# Patient Record
Sex: FEMALE | Race: WHITE | NOT HISPANIC OR LATINO | Employment: UNEMPLOYED | URBAN - METROPOLITAN AREA
[De-identification: names, ages, dates, MRNs, and addresses within clinical notes are randomized per-mention and may not be internally consistent; named-entity substitution may affect disease eponyms.]

---

## 2022-11-02 DIAGNOSIS — R56.9 SEIZURE: Primary | ICD-10-CM

## 2024-02-01 ENCOUNTER — OFFICE VISIT (OUTPATIENT)
Dept: NEUROLOGY | Facility: CLINIC | Age: 69
End: 2024-02-01
Payer: MEDICARE

## 2024-02-01 VITALS — SYSTOLIC BLOOD PRESSURE: 120 MMHG | DIASTOLIC BLOOD PRESSURE: 83 MMHG | HEART RATE: 90 BPM | WEIGHT: 163.56 LBS

## 2024-02-01 DIAGNOSIS — R56.9 CONVULSIONS, UNSPECIFIED CONVULSION TYPE: Primary | ICD-10-CM

## 2024-02-01 DIAGNOSIS — I50.9 CONGESTIVE HEART FAILURE, UNSPECIFIED HF CHRONICITY, UNSPECIFIED HEART FAILURE TYPE: ICD-10-CM

## 2024-02-01 PROCEDURE — 99417 PROLNG OP E/M EACH 15 MIN: CPT | Mod: S$PBB,,, | Performed by: PSYCHIATRY & NEUROLOGY

## 2024-02-01 PROCEDURE — 99213 OFFICE O/P EST LOW 20 MIN: CPT | Mod: PBBFAC | Performed by: PSYCHIATRY & NEUROLOGY

## 2024-02-01 PROCEDURE — 99205 OFFICE O/P NEW HI 60 MIN: CPT | Mod: S$PBB,,, | Performed by: PSYCHIATRY & NEUROLOGY

## 2024-02-01 PROCEDURE — 99999 PR PBB SHADOW E&M-EST. PATIENT-LVL III: CPT | Mod: PBBFAC,,, | Performed by: PSYCHIATRY & NEUROLOGY

## 2024-02-01 RX ORDER — MAGNESIUM GLUCONATE 27.5 (500)
500 TABLET ORAL
COMMUNITY

## 2024-02-01 RX ORDER — LOSARTAN POTASSIUM 50 MG/1
50 TABLET ORAL
COMMUNITY

## 2024-02-01 RX ORDER — LEVETIRACETAM 1000 MG/1
1500 TABLET ORAL 2 TIMES DAILY
Status: ON HOLD | COMMUNITY
End: 2024-03-21

## 2024-02-01 RX ORDER — CENOBAMATE 200 MG/1
1 TABLET, FILM COATED ORAL
Status: ON HOLD | COMMUNITY
Start: 2024-01-08 | End: 2024-03-21 | Stop reason: HOSPADM

## 2024-02-01 RX ORDER — NIFEDIPINE 30 MG/1
30 TABLET, EXTENDED RELEASE ORAL
Status: ON HOLD | COMMUNITY
End: 2024-03-18

## 2024-02-01 RX ORDER — NIFEDIPINE 30 MG/1
30 TABLET, FILM COATED, EXTENDED RELEASE ORAL 2 TIMES DAILY
COMMUNITY
Start: 2023-12-21 | End: 2024-04-19

## 2024-02-01 RX ORDER — TOPIRAMATE 25 MG/1
2 CAPSULE, EXTENDED RELEASE ORAL 2 TIMES DAILY
Status: ON HOLD | COMMUNITY
Start: 2024-01-09 | End: 2024-03-21 | Stop reason: HOSPADM
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
## 2024-02-01 NOTE — PATIENT INSTRUCTIONS
You came to Epilepsy Clinic because of your seizure disorder.  Your seizures are not well controlled. I would like to schedule you for an inpatient epilepsy monitoring unit (EMU) admission.  During this prolonged inpatient admission, you will be monitored continuously using scalp EEGs for as long as 5-7 days.  During this admission, we will lower or stop your antiseizure medications and may perform different maneuvers to try to induce seizures.  There are risks associated with inducing seizures, including injury and even death.  However, these risks persist in the hospital or at home with every seizure and we take many steps to protect you including a padded bed with rails, continuous video monitoring, a call button for immediate nursing assistance, continuous cardiac monitoring, and an inpatient medical team to closely follow your progress. It is too dangerous to stop medications at home without continuous monitoring so this must be done while inpatient.  I feel this admission is necessary in order to better understand your seizure disorder so we can best treat it.  Our epilepsy nurse will call you to schedule this appointment.      In order for us to proceed with the EMU, you will need to get a routine outpatient EEG to look at the electrical firing of your brain. Please call [phone] to schedule this.  Please sleep half your normal amount before the test so that you will fall asleep during the procedure which will provide us more information about how your brain works.  I would also like you to get an MRI to investigate whether a structural abnormality is contributing to your seizures.  Your study is with contrast, an IV will be placed in order to administer the dye during the procedure.  You will need to get a lab confirming your kidneys are healthy prior to the administration of contrast dye.    Topiramate is associated with eye changes. Please go back to the eye doctor to evaluate your worsening vision  issues.     Do not miss any doses of medication. If a dose of medication is missed, take it as soon as it is remembered even if that means doubling up on the dose. Please get a lab test to check out the blood level of medication. Get regular sleep. Go to sleep at the same time and wake up at the same time every day. People with epilepsy require more sleep than people without epilepsy.  Sleeping 10-12 hours a day can be normal for a person with epilepsy.  Every seizure makes it harder to prevent the next seizure. Epilepsy is associated with SUDEP, or sudden unexpected death in epilepsy.  The risk is significantly higher if convulsive seizures are not well controlled. For more information, check out these websites: https://www.epilepsy.com/, https://www.epilepsyalliance[place].org/, www.sera-epilepsy.org, www.womenandepilepsy.org.  If you are interested in meeting other individuals in our epilepsy community, please reach out to the [facility] [place] ([phone], [phone], [email or web address]).  They organize many informative and fun activities in the region.  They can provide you invaluable information on how to get access to resources available for patients living with epilepsy as well as a rich community of like-minded individuals who are all learning to cope with the same issues.  It is very important to remember, you are not alone.     Per [place] law, no episodes of loss of consciousness for 6 months before driving.  Avoid dangerous situations.  For example, no baths/pools alone, no heights, no power tools.  Wear a bike helmet.  If breakthrough seizures occur that are different in character, frequency, or duration from normal episodes, please patient portal me or call the office and we will decide the next steps. If multiple seizures occur in a row without return back to baseline, 911 needs to be called.     Return to clinic in 2 months or sooner with issues.  Please patient portal with  any questions or concerns.    Gladis Gamez MD PhD [facility]  Neurology-Epilepsy  [facility].    Forms/Letters/Disability/DMV Paperwork: We understand the importance of filling out forms and providing letters in a timely manner.  However, many of these forms have very tricky language and once an official form is submitted as part of the medical record, it can not be modified or erased.  Please work with us in order to get these forms filled out in the most complete, accurate, and efficient way. 1.  Once you are aware that a form will need to be completed, please make an appointment.  A virtual appointment with Dr. Gamez or Maritza is perfectly fine. 2.  Please fill out the form as much as you can.  There are many questions that we do not have an answer for.  Please bring a blank copy of the form and your partially filled out form to the clinic visit or send them to us over the portal.  We will complete the form together with you during the clinic visit, sign it, and either return it to you or send it to the correct destination.  Every form will require an appointment however we can fill out multiple forms at once if needed.  Please do not hesitate to reach out with any questions or concerns about this policy.  We are trying to make sure that we have a system in place to meet this need which works for everyone involved.  Thank you for your understanding.

## 2024-02-02 ENCOUNTER — TELEPHONE (OUTPATIENT)
Dept: NEUROLOGY | Facility: CLINIC | Age: 69
End: 2024-02-02
Payer: MEDICARE

## 2024-02-02 ENCOUNTER — LAB VISIT (OUTPATIENT)
Dept: LAB | Facility: HOSPITAL | Age: 69
End: 2024-02-02
Attending: PSYCHIATRY & NEUROLOGY
Payer: MEDICARE

## 2024-02-02 DIAGNOSIS — R56.9 CONVULSIONS, UNSPECIFIED CONVULSION TYPE: ICD-10-CM

## 2024-02-02 DIAGNOSIS — R56.9 CONVULSIONS, UNSPECIFIED CONVULSION TYPE: Primary | ICD-10-CM

## 2024-02-02 LAB
ALBUMIN SERPL BCP-MCNC: 4.1 G/DL (ref 3.5–5.2)
ALP SERPL-CCNC: 120 U/L (ref 55–135)
ALT SERPL W/O P-5'-P-CCNC: 21 U/L (ref 10–44)
ANION GAP SERPL CALC-SCNC: 4 MMOL/L (ref 8–16)
AST SERPL-CCNC: 21 U/L (ref 10–40)
BILIRUB SERPL-MCNC: 0.3 MG/DL (ref 0.1–1)
BUN SERPL-MCNC: 23 MG/DL (ref 8–23)
CALCIUM SERPL-MCNC: 9.9 MG/DL (ref 8.7–10.5)
CHLORIDE SERPL-SCNC: 110 MMOL/L (ref 95–110)
CO2 SERPL-SCNC: 27 MMOL/L (ref 23–29)
CREAT SERPL-MCNC: 0.9 MG/DL (ref 0.5–1.4)
ERYTHROCYTE [DISTWIDTH] IN BLOOD BY AUTOMATED COUNT: 12.3 % (ref 11.5–14.5)
EST. GFR  (NO RACE VARIABLE): >60 ML/MIN/1.73 M^2
GLUCOSE SERPL-MCNC: 96 MG/DL (ref 70–110)
HCT VFR BLD AUTO: 39.9 % (ref 37–48.5)
HGB BLD-MCNC: 12.7 G/DL (ref 12–16)
MCH RBC QN AUTO: 29.4 PG (ref 27–31)
MCHC RBC AUTO-ENTMCNC: 31.8 G/DL (ref 32–36)
MCV RBC AUTO: 92 FL (ref 82–98)
PLATELET # BLD AUTO: 228 K/UL (ref 150–450)
PMV BLD AUTO: 10.4 FL (ref 9.2–12.9)
POTASSIUM SERPL-SCNC: 4.2 MMOL/L (ref 3.5–5.1)
PROT SERPL-MCNC: 7.6 G/DL (ref 6–8.4)
RBC # BLD AUTO: 4.32 M/UL (ref 4–5.4)
SODIUM SERPL-SCNC: 141 MMOL/L (ref 136–145)
WBC # BLD AUTO: 5.16 K/UL (ref 3.9–12.7)

## 2024-02-02 PROCEDURE — 80299 QUANTITATIVE ASSAY DRUG: CPT | Performed by: PSYCHIATRY & NEUROLOGY

## 2024-02-02 PROCEDURE — 80177 DRUG SCRN QUAN LEVETIRACETAM: CPT | Performed by: PSYCHIATRY & NEUROLOGY

## 2024-02-02 PROCEDURE — 80053 COMPREHEN METABOLIC PANEL: CPT | Performed by: PSYCHIATRY & NEUROLOGY

## 2024-02-02 PROCEDURE — 85027 COMPLETE CBC AUTOMATED: CPT | Performed by: PSYCHIATRY & NEUROLOGY

## 2024-02-02 NOTE — TELEPHONE ENCOUNTER
Scheduled EMU 3/18/24, 1pm. Aware an adult is required to accompany her for the duration including overnight. Aware she will be connected to lines to her head, chest, arm, have an IV placed; will not be able to leave the room until all equipment is removed at discharge. 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 She mentioned she told her cardiologist about this admission and he was ok with her to proceed. Instructions thoroughly discussed; mailed via [employer] to preferred address as follows: [address] [address].

## 2024-02-05 LAB
LEVETIRACETAM SERPL-MCNC: 36.1 UG/ML (ref 3–60)
TOPIRAMATE SERPL-MCNC: 2.4 MCG/ML

## 2024-02-09 LAB — CENOBAMATE (XCOPRI), PLASMA: 19.3 UG/ML

## 2024-03-04 ENCOUNTER — TELEPHONE (OUTPATIENT)
Dept: NEUROLOGY | Facility: CLINIC | Age: 69
End: 2024-03-04
Payer: MEDICARE

## 2024-03-07 ENCOUNTER — HOSPITAL ENCOUNTER (OUTPATIENT)
Dept: NEUROLOGY | Facility: CLINIC | Age: 69
Discharge: HOME | End: 2024-03-07
Payer: MEDICARE

## 2024-03-07 ENCOUNTER — PATIENT MESSAGE (OUTPATIENT)
Dept: NEUROLOGY | Facility: CLINIC | Age: 69
End: 2024-03-07
Payer: MEDICARE

## 2024-03-07 ENCOUNTER — HOSPITAL ENCOUNTER (OUTPATIENT)
Dept: RADIOLOGY | Facility: HOSPITAL | Age: 69
Discharge: HOME | End: 2024-03-07
Attending: PSYCHIATRY & NEUROLOGY
Payer: MEDICARE

## 2024-03-07 ENCOUNTER — TELEPHONE (OUTPATIENT)
Dept: NEUROLOGY | Facility: CLINIC | Age: 69
End: 2024-03-07
Payer: MEDICARE

## 2024-03-07 DIAGNOSIS — R56.9 CONVULSIONS, UNSPECIFIED CONVULSION TYPE: ICD-10-CM

## 2024-03-07 PROCEDURE — 25500020 PHARM REV CODE 255: Performed by: PSYCHIATRY & NEUROLOGY

## 2024-03-07 PROCEDURE — A9585 GADOBUTROL INJECTION: HCPCS | Performed by: PSYCHIATRY & NEUROLOGY

## 2024-03-07 PROCEDURE — 70553 MRI BRAIN STEM W/O & W/DYE: CPT | Mod: TC

## 2024-03-07 PROCEDURE — 70553 MRI BRAIN STEM W/O & W/DYE: CPT | Mod: 26,,, | Performed by: STUDENT IN AN ORGANIZED HEALTH CARE EDUCATION/TRAINING PROGRAM

## 2024-03-07 PROCEDURE — 95819 EEG AWAKE AND ASLEEP: CPT | Mod: 26,S$PBB,, | Performed by: PSYCHIATRY & NEUROLOGY

## 2024-03-07 PROCEDURE — 95819 EEG AWAKE AND ASLEEP: CPT | Mod: PBBFAC | Performed by: PSYCHIATRY & NEUROLOGY

## 2024-03-07 RX ORDER — GADOBUTROL 604.72 MG/ML
8 INJECTION INTRAVENOUS
Status: COMPLETED | OUTPATIENT
Start: 2024-03-07 | End: 2024-03-07

## 2024-03-07 RX ADMIN — GADOBUTROL 8 ML: 604.72 INJECTION INTRAVENOUS at 08:03

## 2024-03-07 NOTE — PROCEDURES
Date of service  03/07/2024    Introduction  Electroencephalographic (EEG) recording is performed with electrodes placed according to the International 10-20 placement system. Thirty two (32) channels of digital signal (sampling rate of 512/sec) including T1 and T2 was simultaneously recorded from the scalp and may include EKG, EMG, and/or eye monitors. Recording band pass was 0.1 to 512 Hz. Digital video recording of the patient is simultaneously recorded with the EEG. The patient is instructed to report clinical symptoms which may occur during the recording session. EEG and video recording is stored and archived in digital format. Activation procedures which include photic stimulation, hyperventilation and instructing patients to perform simple tasks are done in selected patients.    The EEG is displayed on a monitor screen and can be reviewed using different montages. Computer assisted analysis is employed to detect spike and electrographic seizure activity. The entire record is submitted for computer analysis. The entire recording is visually reviewed and, the times identified by computer analysis as being spikes or seizures are reviewed again.     Compressed spectral analysis (CSA) is also performed on the activity recorded from each individual channel. This is displayed as a power display of frequencies from 0 to 30 Hz over time. The CSA is reviewed looking for asymmetries in power between homologous areas of the scalp and then compared with the original EEG recording.     Findings  The patient's background consists of a posteriorly dominant 10 Hz alpha rhythm, which is well-formed, modulated, and abolishes with eye opening.  Anteriorly, the patient's background consists of predominantly beta range frequencies.    Hyperventilation and photic stimulation did not produce any abnormal response. Patient answered orientation questions correctly.     During the course of the recording, the patient is noted to be  awake, subsequently becomes drowsy, and falls asleep briefly with normal, symmetric sleep architecture seen. During drowsiness there is mild 6-7 Hz right temporal slowing.    There are no focal, lateralized, or epileptiform transients.  No electrographic seizures are seen.    EKG demonstrates regular rhythm.    Interpretation  This EEG shows mild right temporal focal slowing, which is indicative of focal disturbance of cerebral function. No potentially epileptiform activity was seen. Please be aware that a normal EEG does not exclude the possibility of an underlying seizure disorder.

## 2024-03-07 NOTE — TELEPHONE ENCOUNTER
----- Message from Gladis Gamez MD PhD sent at 3/6/2024  6:14 AM CST -----    ----- Message -----  From: Radha Arriaga  Sent: 3/5/2024  11:45 AM CST  To: Franco Griffith Staff    Type:  Patient Returning Call    Who Called: Pt  Who Left Message for Patient:  Does the patient know what this is regarding?: procedure instructions  Would the patient rather a call back or a response via [facility]? Call  Best Call Back Number:  [phone]  Additional Information: Pt would like to speak to provider's nurse to get instructions related to procedure scheduled for 3/18/24.  Pt states she misplaced the paper with the instructions on them and would like another copy.

## 2024-03-18 ENCOUNTER — HOSPITAL ENCOUNTER (INPATIENT)
Facility: HOSPITAL | Age: 69
LOS: 5 days | Discharge: HOME | DRG: 101 | End: 2024-03-23
Attending: PSYCHIATRY & NEUROLOGY | Admitting: STUDENT IN AN ORGANIZED HEALTH CARE EDUCATION/TRAINING PROGRAM
Payer: MEDICARE

## 2024-03-18 ENCOUNTER — DOCUMENTATION ONLY (OUTPATIENT)
Dept: NEUROLOGY | Facility: CLINIC | Age: 69
End: 2024-03-18
Payer: MEDICARE

## 2024-03-18 DIAGNOSIS — R07.9 CHEST PAIN: ICD-10-CM

## 2024-03-18 DIAGNOSIS — R56.9 CONVULSIONS, UNSPECIFIED CONVULSION TYPE: ICD-10-CM

## 2024-03-18 DIAGNOSIS — R56.9 SEIZURE: ICD-10-CM

## 2024-03-18 DIAGNOSIS — G40.909 SECONDARY SEIZURE DISORDER: Primary | ICD-10-CM

## 2024-03-18 DIAGNOSIS — R07.89 OTHER CHEST PAIN: ICD-10-CM

## 2024-03-18 PROBLEM — Z86.79 HISTORY OF SPONTANEOUS INTRAPARENCHYMAL INTRACRANIAL HEMORRHAGE: Status: ACTIVE | Noted: 2024-03-18

## 2024-03-18 LAB
ALBUMIN SERPL BCP-MCNC: 4.1 G/DL (ref 3.5–5.2)
ALP SERPL-CCNC: 115 U/L (ref 55–135)
ALT SERPL W/O P-5'-P-CCNC: 20 U/L (ref 10–44)
ANION GAP SERPL CALC-SCNC: 9 MMOL/L (ref 8–16)
AST SERPL-CCNC: 21 U/L (ref 10–40)
BASOPHILS # BLD AUTO: 0.06 K/UL (ref 0–0.2)
BASOPHILS NFR BLD: 0.8 % (ref 0–1.9)
BILIRUB SERPL-MCNC: 0.2 MG/DL (ref 0.1–1)
BUN SERPL-MCNC: 23 MG/DL (ref 8–23)
CALCIUM SERPL-MCNC: 10.1 MG/DL (ref 8.7–10.5)
CHLORIDE SERPL-SCNC: 104 MMOL/L (ref 95–110)
CO2 SERPL-SCNC: 25 MMOL/L (ref 23–29)
CREAT SERPL-MCNC: 0.8 MG/DL (ref 0.5–1.4)
DIFFERENTIAL METHOD BLD: ABNORMAL
EOSINOPHIL # BLD AUTO: 0.1 K/UL (ref 0–0.5)
EOSINOPHIL NFR BLD: 1.7 % (ref 0–8)
ERYTHROCYTE [DISTWIDTH] IN BLOOD BY AUTOMATED COUNT: 12.4 % (ref 11.5–14.5)
EST. GFR  (NO RACE VARIABLE): >60 ML/MIN/1.73 M^2
GLUCOSE SERPL-MCNC: 99 MG/DL (ref 70–110)
HCT VFR BLD AUTO: 41 % (ref 37–48.5)
HGB BLD-MCNC: 12.9 G/DL (ref 12–16)
IMM GRANULOCYTES # BLD AUTO: 0.03 K/UL (ref 0–0.04)
IMM GRANULOCYTES NFR BLD AUTO: 0.4 % (ref 0–0.5)
LYMPHOCYTES # BLD AUTO: 2.6 K/UL (ref 1–4.8)
LYMPHOCYTES NFR BLD: 36.7 % (ref 18–48)
MCH RBC QN AUTO: 29.7 PG (ref 27–31)
MCHC RBC AUTO-ENTMCNC: 31.5 G/DL (ref 32–36)
MCV RBC AUTO: 94 FL (ref 82–98)
MONOCYTES # BLD AUTO: 0.7 K/UL (ref 0.3–1)
MONOCYTES NFR BLD: 9.1 % (ref 4–15)
NEUTROPHILS # BLD AUTO: 3.7 K/UL (ref 1.8–7.7)
NEUTROPHILS NFR BLD: 51.3 % (ref 38–73)
NRBC BLD-RTO: 0 /100 WBC
PLATELET # BLD AUTO: 241 K/UL (ref 150–450)
PMV BLD AUTO: 10.5 FL (ref 9.2–12.9)
POTASSIUM SERPL-SCNC: 3.7 MMOL/L (ref 3.5–5.1)
PROT SERPL-MCNC: 7.8 G/DL (ref 6–8.4)
RBC # BLD AUTO: 4.35 M/UL (ref 4–5.4)
SODIUM SERPL-SCNC: 138 MMOL/L (ref 136–145)
WBC # BLD AUTO: 7.16 K/UL (ref 3.9–12.7)

## 2024-03-18 PROCEDURE — 99223 1ST HOSP IP/OBS HIGH 75: CPT | Mod: AI,,, | Performed by: STUDENT IN AN ORGANIZED HEALTH CARE EDUCATION/TRAINING PROGRAM

## 2024-03-18 PROCEDURE — 95714 VEEG EA 12-26 HR UNMNTR: CPT

## 2024-03-18 PROCEDURE — 80307 DRUG TEST PRSMV CHEM ANLYZR: CPT | Performed by: STUDENT IN AN ORGANIZED HEALTH CARE EDUCATION/TRAINING PROGRAM

## 2024-03-18 PROCEDURE — 36415 COLL VENOUS BLD VENIPUNCTURE: CPT | Performed by: PHYSICIAN ASSISTANT

## 2024-03-18 PROCEDURE — 93005 ELECTROCARDIOGRAM TRACING: CPT

## 2024-03-18 PROCEDURE — 25000003 PHARM REV CODE 250: Performed by: STUDENT IN AN ORGANIZED HEALTH CARE EDUCATION/TRAINING PROGRAM

## 2024-03-18 PROCEDURE — 80053 COMPREHEN METABOLIC PANEL: CPT | Performed by: PHYSICIAN ASSISTANT

## 2024-03-18 PROCEDURE — 95700 EEG CONT REC W/VID EEG TECH: CPT

## 2024-03-18 PROCEDURE — 95720 EEG PHY/QHP EA INCR W/VEEG: CPT | Mod: ,,, | Performed by: STUDENT IN AN ORGANIZED HEALTH CARE EDUCATION/TRAINING PROGRAM

## 2024-03-18 PROCEDURE — 80299 QUANTITATIVE ASSAY DRUG: CPT | Performed by: STUDENT IN AN ORGANIZED HEALTH CARE EDUCATION/TRAINING PROGRAM

## 2024-03-18 PROCEDURE — 36415 COLL VENOUS BLD VENIPUNCTURE: CPT | Mod: XB | Performed by: STUDENT IN AN ORGANIZED HEALTH CARE EDUCATION/TRAINING PROGRAM

## 2024-03-18 PROCEDURE — 80177 DRUG SCRN QUAN LEVETIRACETAM: CPT | Performed by: STUDENT IN AN ORGANIZED HEALTH CARE EDUCATION/TRAINING PROGRAM

## 2024-03-18 PROCEDURE — 93010 ELECTROCARDIOGRAM REPORT: CPT | Mod: ,,, | Performed by: INTERNAL MEDICINE

## 2024-03-18 PROCEDURE — 80201 ASSAY OF TOPIRAMATE: CPT | Performed by: STUDENT IN AN ORGANIZED HEALTH CARE EDUCATION/TRAINING PROGRAM

## 2024-03-18 PROCEDURE — 85025 COMPLETE CBC W/AUTO DIFF WBC: CPT | Performed by: PHYSICIAN ASSISTANT

## 2024-03-18 PROCEDURE — 11000001 HC ACUTE MED/SURG PRIVATE ROOM

## 2024-03-18 RX ORDER — NIFEDIPINE 30 MG/1
30 TABLET, EXTENDED RELEASE ORAL 2 TIMES DAILY
Status: DISCONTINUED | OUTPATIENT
Start: 2024-03-18 | End: 2024-03-23 | Stop reason: HOSPADM

## 2024-03-18 RX ORDER — MAGNESIUM GLUCONATE 27 MG(500)
27 TABLET ORAL DAILY
Status: DISCONTINUED | OUTPATIENT
Start: 2024-03-19 | End: 2024-03-19

## 2024-03-18 RX ORDER — LEVETIRACETAM 750 MG/1
1500 TABLET ORAL 2 TIMES DAILY
Status: DISCONTINUED | OUTPATIENT
Start: 2024-03-18 | End: 2024-03-19

## 2024-03-18 RX ORDER — LORAZEPAM 2 MG/ML
2 INJECTION INTRAMUSCULAR EVERY 5 MIN PRN
Status: DISCONTINUED | OUTPATIENT
Start: 2024-03-18 | End: 2024-03-23 | Stop reason: HOSPADM

## 2024-03-18 RX ORDER — LOSARTAN POTASSIUM 50 MG/1
50 TABLET ORAL DAILY
Status: DISCONTINUED | OUTPATIENT
Start: 2024-03-19 | End: 2024-03-23 | Stop reason: HOSPADM

## 2024-03-18 RX ORDER — SODIUM CHLORIDE 0.9 % (FLUSH) 0.9 %
10 SYRINGE (ML) INJECTION
Status: DISCONTINUED | OUTPATIENT
Start: 2024-03-18 | End: 2024-03-23 | Stop reason: HOSPADM

## 2024-03-18 RX ADMIN — NIFEDIPINE 30 MG: 30 TABLET, FILM COATED, EXTENDED RELEASE ORAL at 09:03

## 2024-03-18 RX ADMIN — LEVETIRACETAM 1500 MG: 750 TABLET, FILM COATED ORAL at 09:03
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
## 2024-03-18 NOTE — ASSESSMENT & PLAN NOTE
Patient refers most recent ECHO showed systolic and diastolic HF w EF 40-45%.   No edema on exam, on RA.     - Continue nifedipine 30mg BID and losartan 50mg QD   - Cardiac monitor

## 2024-03-18 NOTE — SUBJECTIVE & OBJECTIVE
History reviewed. No pertinent past medical history.    History reviewed. No pertinent surgical history.    Review of patient's allergies indicates:  No Known Allergies    No current facility-administered medications on file prior to encounter.     Current Outpatient Medications on File Prior to Encounter   Medication Sig    levETIRAcetam (KEPPRA) 1000 MG tablet Take 1,500 mg by mouth 2 (two) times daily.    losartan (COZAAR) 50 MG tablet Take 50 mg by mouth.    magnesium gluconate 27.5 mg magne- sium (500 mg) Tab Take 500 mg by mouth.    NIFEdipine (ADALAT CC) 30 MG TbSR Take 30 mg by mouth 2 (two) times daily.    topiramate (TROKENDI XR) 25 mg Cp24 Take 2 capsules by mouth 2 (two) times a day.    XCOPRI 200 mg Tab Take 1 tablet by mouth.    [DISCONTINUED] NIFEdipine (PROCARDIA-XL) 30 MG (OSM) 24 hr tablet Take 30 mg by mouth.     Continuous Infusions:    Family History    None       Tobacco Use    Smoking status: Former     Types: Cigarettes    Smokeless tobacco: Never   Substance and Sexual Activity    Alcohol use: Not on file    Drug use: Not on file    Sexual activity: Not on file     Review of Systems   Constitutional:  Negative for appetite change, chills and fever.   HENT:  Negative for congestion, sore throat, trouble swallowing and voice change.    Eyes: Negative.    Respiratory:  Positive for shortness of breath. Negative for cough.    Cardiovascular:  Positive for palpitations. Negative for chest pain and leg swelling.   Gastrointestinal:  Negative for abdominal distention, abdominal pain, constipation, nausea and vomiting.   Endocrine: Negative.    Genitourinary: Negative.    Musculoskeletal:  Positive for gait problem. Negative for back pain.   Skin: Negative.    Neurological:  Positive for dizziness, seizures, weakness and headaches.   Psychiatric/Behavioral:  Positive for confusion. The patient is nervous/anxious.      Objective:     Vital Signs (Most Recent):    Vital Signs (24h Range):            There is no height or weight on file to calculate BMI.     Physical Exam  Constitutional:       Appearance: Normal appearance.   HENT:      Head: Normocephalic and atraumatic.      Nose: Nose normal.      Mouth/Throat:      Mouth: Mucous membranes are moist.   Eyes:      Extraocular Movements: Extraocular movements intact.      Pupils: Pupils are equal, round, and reactive to light.   Cardiovascular:      Rate and Rhythm: Regular rhythm.   Abdominal:      General: Bowel sounds are normal. There is no distension.      Palpations: Abdomen is soft.      Tenderness: There is no abdominal tenderness.   Musculoskeletal:         General: No swelling. Normal range of motion.      Cervical back: Normal range of motion.   Skin:     General: Skin is warm.      Capillary Refill: Capillary refill takes less than 2 seconds.   Neurological:      General: No focal deficit present.      Mental Status: She is alert and oriented to person, place, and time. Mental status is at baseline.      Cranial Nerves: Cranial nerves 2-12 are intact.      Motor: Motor strength is normal.     Coordination: Finger-Nose-Finger Test normal.   Psychiatric:         Speech: Speech normal.            NEUROLOGICAL EXAMINATION:     MENTAL STATUS   Oriented to person, place, and time.   Attention: normal. Concentration: normal.   Speech: speech is normal     CRANIAL NERVES   Cranial nerves II through XII intact.     CN III, IV, VI   Pupils are equal, round, and reactive to light.    MOTOR EXAM   Right arm pronator drift: absent  Left arm pronator drift: absent    Strength   Strength 5/5 throughout.     SENSORY EXAM   Light touch normal.     GAIT AND COORDINATION      Coordination   Finger to nose coordination: normal    Tremor   Resting tremor: absent      Significant Labs: All pertinent lab results from the past 24 hours have been reviewed.    Significant Studies: I have reviewed and interpreted all pertinent imaging results/findings within the past 24  hours.
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
## 2024-03-18 NOTE — ASSESSMENT & PLAN NOTE
69 yo F w PMH HTN, HFrEF (EF 45%), R ?spontaneous temporal hemorrhage (2002) c/b seizures and migraines who presents to EMU for event capture and medication optimization/wean, patient of Dr. Gamez. Patient is currently on topamax 50mg BID, keppra 1500mg BID, cenobomate 200mg HS. Previously on dilantin 300mg BID and was well controlled for 10 years after first GTC in 2002. Since 2016 started to have increase seizure frequency of similar semiology and then suffered collapse and MI 8/2022 and since then seizures have not been well controlled despite adding 2 AEDs. Previous EEG w R temporal slowing, MRI w focal encephalomalacia at R superior temporal gyrus as well as R hippocampal volume loss.   Auras: chest tightness that progress to throat, w abnormal smell and taste.  Events: flushing, diaphoresis, popped veins in neck and face, starring w semi loss of awareness. Last seizure 3/16/24 .     Patient is having significant side effects including cardiac from topamax and cenobomate, for which will need admission to wean off medications while on EEG for event captures and she has not had EEG to show that these events are epileptic in nature. She has RF for both epileptic and non epileptic events.     Plan  - Admit to EMU for continuous vEEG  - Hold cenobomate (last dose 3/17 pm) and topamax (last dose 3/18 am) while inpatient for event capture  - Continue keppra 1500mg BID for now   - CBC, CMP, UDS,  EKG, AED levels   - IV Ativan PRN for GTC greater than 5 min - please call epilepsy on call before administering  - Seizure precautions, suction and oxygen at bedside  - Fall precautions, side rail padding in place  - Visi monitoring with continuous pulse oximetry   - Telemetry
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
## 2024-03-19 ENCOUNTER — DOCUMENTATION ONLY (OUTPATIENT)
Dept: NEUROLOGY | Facility: CLINIC | Age: 69
End: 2024-03-19
Payer: MEDICARE

## 2024-03-19 LAB
AMPHET+METHAMPHET UR QL: NEGATIVE
BARBITURATES UR QL SCN>200 NG/ML: NEGATIVE
BENZODIAZ UR QL SCN>200 NG/ML: NEGATIVE
BZE UR QL SCN: NEGATIVE
CANNABINOIDS UR QL SCN: NEGATIVE
CREAT UR-MCNC: 26 MG/DL (ref 15–325)
METHADONE UR QL SCN>300 NG/ML: NEGATIVE
OHS QRS DURATION: 88 MS
OHS QTC CALCULATION: 426 MS
OPIATES UR QL SCN: NEGATIVE
PCP UR QL SCN>25 NG/ML: NEGATIVE
TOXICOLOGY INFORMATION: NORMAL

## 2024-03-19 PROCEDURE — 99233 SBSQ HOSP IP/OBS HIGH 50: CPT | Mod: FS,,, | Performed by: STUDENT IN AN ORGANIZED HEALTH CARE EDUCATION/TRAINING PROGRAM

## 2024-03-19 PROCEDURE — 99499 UNLISTED E&M SERVICE: CPT | Mod: ,,, | Performed by: PHYSICIAN ASSISTANT

## 2024-03-19 PROCEDURE — 25000003 PHARM REV CODE 250: Performed by: STUDENT IN AN ORGANIZED HEALTH CARE EDUCATION/TRAINING PROGRAM

## 2024-03-19 PROCEDURE — 25000003 PHARM REV CODE 250: Performed by: PHYSICIAN ASSISTANT

## 2024-03-19 PROCEDURE — 11000001 HC ACUTE MED/SURG PRIVATE ROOM

## 2024-03-19 PROCEDURE — 95720 EEG PHY/QHP EA INCR W/VEEG: CPT | Mod: ,,, | Performed by: STUDENT IN AN ORGANIZED HEALTH CARE EDUCATION/TRAINING PROGRAM

## 2024-03-19 PROCEDURE — 95714 VEEG EA 12-26 HR UNMNTR: CPT

## 2024-03-19 RX ORDER — ACETAMINOPHEN 325 MG/1
650 TABLET ORAL EVERY 6 HOURS PRN
Status: DISCONTINUED | OUTPATIENT
Start: 2024-03-19 | End: 2024-03-23 | Stop reason: HOSPADM

## 2024-03-19 RX ORDER — ONDANSETRON 4 MG/1
4 TABLET, ORALLY DISINTEGRATING ORAL EVERY 6 HOURS PRN
Status: DISCONTINUED | OUTPATIENT
Start: 2024-03-19 | End: 2024-03-23 | Stop reason: HOSPADM

## 2024-03-19 RX ORDER — MAGNESIUM GLUCONATE 27 MG(500)
27 TABLET ORAL 2 TIMES DAILY
Status: DISCONTINUED | OUTPATIENT
Start: 2024-03-19 | End: 2024-03-23 | Stop reason: HOSPADM

## 2024-03-19 RX ORDER — LEVETIRACETAM 750 MG/1
750 TABLET ORAL 2 TIMES DAILY
Status: COMPLETED | OUTPATIENT
Start: 2024-03-19 | End: 2024-03-19

## 2024-03-19 RX ORDER — ACETAMINOPHEN 325 MG/1
650 TABLET ORAL EVERY 6 HOURS PRN
Status: DISCONTINUED | OUTPATIENT
Start: 2024-03-19 | End: 2024-03-19

## 2024-03-19 RX ADMIN — NIFEDIPINE 30 MG: 30 TABLET, FILM COATED, EXTENDED RELEASE ORAL at 09:03

## 2024-03-19 RX ADMIN — Medication 27 MG: at 08:03

## 2024-03-19 RX ADMIN — LOSARTAN POTASSIUM 50 MG: 50 TABLET, FILM COATED ORAL at 08:03

## 2024-03-19 RX ADMIN — LEVETIRACETAM 1500 MG: 750 TABLET, FILM COATED ORAL at 08:03

## 2024-03-19 RX ADMIN — LEVETIRACETAM 750 MG: 750 TABLET, FILM COATED ORAL at 09:03

## 2024-03-19 RX ADMIN — Medication 27 MG: at 09:03

## 2024-03-19 RX ADMIN — NIFEDIPINE 30 MG: 30 TABLET, FILM COATED, EXTENDED RELEASE ORAL at 08:03

## 2024-03-19 NOTE — ASSESSMENT & PLAN NOTE
Patient reports most recent echo showed systolic and diastolic dysfunction, EF 40-45%  - No s/s of exacerbation, stable on RA  - Continue home Nifedipine 30 mg BID and Losartan 50 mg daily  - Telemetry

## 2024-03-19 NOTE — NURSING
Nurses Note -- 4 Eyes      3/18/24   19:30      Skin assessed during: Admit      [x] No Altered Skin Integrity Present    [x]Prevention Measures Documented      [] Yes- Altered Skin Integrity Present or Discovered   [] LDA Added if Not in Epic (Describe Wound)   [] New Altered Skin Integrity was Present on Admit and Documented in LDA   [] Wound Image Taken    Wound Care Consulted? No    Attending Nurse: Roopa Ortiz RN/Staff Member:  Arielle

## 2024-03-19 NOTE — PLAN OF CARE
David Nicole - Neurosurgery (Hospital)  Initial Discharge Assessment       Primary Care Provider: No, Primary Doctor    Admission Diagnosis: Convulsions, unspecified convulsion type [R56.9]    Admission Date: 3/18/2024  Expected Discharge Date:     Transition of Care Barriers: (P) None    Payor: MEDICARE / Plan: MEDICARE PART A & B / Product Type: Government /     Extended Emergency Contact Information  Primary Emergency Contact: Figueroa Buck  Address: [address] [place] [address]           [place] [address] [facility]  Home Phone: [phone]  Mobile Phone: [phone]  Relation: Significant other    Discharge Plan A: (P) Home with family       No Pharmacies Listed    Initial Assessment (most recent)       Adult Discharge Assessment - 03/19/24 1212          Discharge Assessment    Assessment Type Discharge Planning Assessment (P)      Confirmed/corrected address, phone number and insurance Yes (P)      Confirmed Demographics Correct on Facesheet (P)      Source of Information patient (P)      When was your last doctors appointment? 01/09/24 (P)      Reason For Admission epilepsy monitoring (P)      People in Home significant other (P)      Do you expect to return to your current living situation? Yes (P)      Do you have help at home or someone to help you manage your care at home? Yes (P)      Who are your caregiver(s) and their phone number(s)? LISA Marti [phone] (P)      Prior to hospitilization cognitive status: Unable to Assess (P)      Current cognitive status: Alert/Oriented (P)      Walking or Climbing Stairs Difficulty no (P)      Dressing/Bathing Difficulty no (P)      Home Accessibility wheelchair accessible (P)      Home Layout Able to live on 1st floor (P)      Equipment Currently Used at Home none (P)      Readmission within 30 days? No (P)      Patient currently being followed by outpatient case management? No (P)      Do you currently have service(s) that help you manage your care at home? No (P)       Do you take prescription medications? Yes (P)      Do you have prescription coverage? Yes (P)      Coverage Medicare (P)      Do you have any problems affording any of your prescribed medications? No (P)      Is the patient taking medications as prescribed? yes (P)      Who is going to help you get home at discharge? LISA Marti (P)      How do you get to doctors appointments? car, drives self;family or friend will provide (P)      Are you on dialysis? No (P)      Do you take coumadin? No (P)      Discharge Plan A Home with family (P)      DME Needed Upon Discharge  none (P)      Transition of Care Barriers None (P)         Physical Activity    On average, how many days per week do you engage in moderate to strenuous exercise (like a brisk walk)? 3 days (P)      On average, how many minutes do you engage in exercise at this level? 30 min (P)         Financial Resource Strain    How hard is it for you to pay for the very basics like food, housing, medical care, and heating? Not hard at all (P)         Housing Stability    In the last 12 months, was there a time when you were not able to pay the mortgage or rent on time? No (P)      In the last 12 months, was there a time when you did not have a steady place to sleep or slept in a shelter (including now)? No (P)         Transportation Needs    In the past 12 months, has lack of transportation kept you from medical appointments or from getting medications? No (P)      In the past 12 months, has lack of transportation kept you from meetings, work, or from getting things needed for daily living? No (P)         Food Insecurity    Within the past 12 months, you worried that your food would run out before you got the money to buy more. Never true (P)      Within the past 12 months, the food you bought just didn't last and you didn't have money to get more. Never true (P)         Stress    Do you feel stress - tense, restless, nervous, or anxious, or unable to sleep at  night because your mind is troubled all the time - these days? Only a little (P)         Social Connections    In a typical week, how many times do you talk on the phone with family, friends, or neighbors? More than three times a week (P)      How often do you get together with friends or relatives? More than three times a week (P)      How often do you attend Christian or Shinto services? More than 4 times per year (P)      Do you belong to any clubs or organizations such as Christian groups, unions, fraternal or athletic groups, or school groups? Yes (P)      How often do you attend meetings of the clubs or organizations you belong to? More than 4 times per year (P)      Are you , , , , never , or living with a partner? Living with partner (P)         Alcohol Use    Q1: How often do you have a drink containing alcohol? Monthly or less (P)      Q2: How many drinks containing alcohol do you have on a typical day when you are drinking? 1 or 2 (P)      Q3: How often do you have six or more drinks on one occasion? Never (P)         OTHER    Name(s) of People in Home LISA Marti (P)                    Patient lives with her LISA Marti in a [facility] with no steps.  Patient independent with ADL's and ambulation and does not require any DME or HH needs.  Patient is not on HD or Coumadin.  Patient admitted to epilepsy monitoring and will discharge home with family when med cleared.  No anticipated post acute needs.      Discharge Plan A and Plan B have been determined by review of patient's clinical status, future medical and therapeutic needs, and coverage/benefits for post-acute care in coordination with multidisciplinary team members.    Maritza Caballero, MOHINI  [facility]  [phone]

## 2024-03-19 NOTE — PROCEDURES
VIDEO ELECTROENCEPHALOGRAM  REPORT    DATE OF SERVICE:3/18/24-3/19/24  EEG NUMBER: [id number]  REQUESTED BY:  Dr Gladis Gamez  LOCATION OF SERVICE:  [facility]    METHODOLOGY   Electroencephalographic (EEG) recording is with electrodes placed according to the International 10-20 placement system.  Thirty two (32) channels of digital signal (sampling rate of 512/sec) including T1 and T2 was simultaneously recorded from the scalp and may include  EKG, EMG, and/or eye monitors.  Recording band pass was 0.1 to 512 hz.  Digital video recording of the patient is simultaneously recorded with the EEG.  The patient is instructed report clinical symptoms which may occur during the recording session.  EEG and video recording is stored and archived in digital format.  Activation procedures which include photic stimulation, hyperventilation and instructing patients to perform simple task are done in selected patients.   The EEG is displayed on a monitor screen and can be reviewed using different montages.  Computer assisted analysis is employed to detect spike and electrographic seizure activity.   The entire record is submitted for computer analysis.  The entire recording is visually reviewed and the times identified by computer analysis as being spikes or seizures are reviewed again.  Compresses spectral analysis (CSA) is also performed on the activity recorded from each individual channel.  This is displayed as a power display of frequencies from 0 to 30 Hz over time.   The CSA is reviewed looking for asymmetries in power between homologous areas of the scalp and then compared with the original EEG recording.     [employer] software was also utilized in the review of this study.  This software suite analyzes the EEG recording in multiple domains.  Coherence and rhythmicity is computed to identify EEG sections which may contain organized seizures.  Each channel undergoes analysis to detect presence of spike and sharp waves which have  special and morphological characteristic of epileptic activity.  The routine EEG recording is converted from spacial into frequency domain.  This is then displayed comparing homologous areas to identify areas of significant asymmetry.  Algorithm to identify non-cortically generated artifact is used to separate eye movement, EMG and other artifact from the EEG.      ELECTROENCEPHALOGRAM:    RECORDING TIMES:  Start on 3/18/24 at 22:27  Stop on 3/19/24 at 07:00    A total of 15 hrs and 56 min of EEG recording was obtained.    Indication: 68 year old female with history of prior right sided temporal ICH (2002) with subsequent seizures, migraines, heart failure presenting for spell characterization and medication optimization.  Outpatient regimen is Cenobamate 200 mg daily, Topiramate ER 50 mg BID, and Keppra 1500 mg BID.During this record, her home cenobamate and topiramate ER have been held and she is on Keppra 1500 mg BID.      State of Consciousness:   Awake and asleep    Background:   The background is well organized, symmetric and continuous.  There is a normal anterior to posterior gradient consisting of 5-10 mcV amplitude beta activity in the frontal region and well defined alpha activity in the posterior region.   There is a well developed 30-70 uV, 9-10 Hz posterior dominant rhythm that is symmetric and reactive to eye opening and closure appreciated at maximum alertness.  Intermittent polymorphic theta range slowing is observed in the right temporal region.    Sleep:   The patient reaches stage 2 sleep with symmetric vertex waves, sleep spindles, and k complexes noted.     Epileptiform Abnormalities  None    Seizures/Events:   None    EKG:   Regular rate and rhythm on single lead EKG    Activating procedures:   Hyperventilation and photic stimulation are not performed     Impression:   This is an abnormal awake and sleep EEG due to the presence of intermittent right temporal slowing consistent with focal  dysfunction in this region.  No epileptiform discharges or clinical events are observed.     EMU summary:  3/18-3/19: right temporal slowing, no seizures     Shreya Moura MD  [facility]   Department of Neurology/Epilepsy

## 2024-03-19 NOTE — HOSPITAL COURSE
3/18>3/19: Admit to EMU. Held home Cenobamate and Topiramate XR on admit, continue Levetiracetam. No typical events. EEG with right temporal slowing, no epileptiform activity, no electrographic seizures. Proceed for event capture. Decrease Levetiracetam to 750 mg x1 then hold.  3/19>3/20: Episode of chest pain, EKG stable and troponin negative. No typical events. EEG with right > left temporal slowing, no epileptiform activity. Proceed with provoking measures for event capture- HV/PS today, sleep deprivation tonight, activation medications 3/21 AM.  3/20>3/21: Successful sleep deprivation overnight, received benadryl/tramadol this morning. Typical events captured c/w electroclinical seizures- 4 right temporal onset seizures. Gave IV Levetiracetam 2500 mg load followed by 2g BID and start Clobazam 20 mg qhs.   3/21>3/22: 2 electroclinical seizures with right temporal onset. Continue Levetiracetam 2g BID and Clobazam 20 mg qhs. Tentative plan for discharge 3/23 if no further seizures.  See EEG reports for details.  3/22>3/23:  Right sided sharps and bilateral temporal slowing, no seizures

## 2024-03-19 NOTE — PLAN OF CARE
Problem: Adult Inpatient Plan of Care  Goal: Plan of Care Review  Outcome: Ongoing, Progressing  Flowsheets (Taken 3/19/2024 0500)  Plan of Care Reviewed With:   patient   spouse  Goal: Absence of Hospital-Acquired Illness or Injury  Outcome: Ongoing, Progressing  Intervention: Identify and Manage Fall Risk  Flowsheets (Taken 3/19/2024 0500)  Safety Promotion/Fall Prevention:   bed alarm set   family to remain at bedside   medications reviewed   nonskid shoes/socks when out of bed   side rails raised x 3   toileting scheduled   instructed to call staff for mobility  Goal: Optimal Comfort and Wellbeing  Outcome: Ongoing, Progressing  Intervention: Monitor Pain and Promote Comfort  Flowsheets (Taken 3/19/2024 0500)  Pain Management Interventions:   pain management plan reviewed with patient/caregiver   pillow support provided   position adjusted   quiet environment facilitated     Bed in lowest position, bed alarm on, call light within reach and pt instructed to call if they need to get up or need any assistance. Pt with ongoing continuous EEG. No events overnight. Educated on POC/education for the stay. VISI in place, see flowsheets for VS. POC ongoing.

## 2024-03-19 NOTE — PLAN OF CARE
Problem: Adult Inpatient Plan of Care  Goal: Patient-Specific Goal (Individualized)  Outcome: Ongoing, Progressing     Problem: Seizure, Active Management  Goal: Absence of Seizure/Seizure-Related Injury  Outcome: Ongoing, Progressing     Problem: Fall Injury Risk  Goal: Absence of Fall and Fall-Related Injury  Outcome: Ongoing, Progressing     POC reviewed and updated with the patient/spouse. Questions regarding POC were encouraged and addressed with the patient.  VSS, see flow-sheets. Tele maintained per order.  VISI applied/ maintained for additional monitoring. Patient is AO X 4 at this time. Continuous EEG in place. Fall/safety precautions maintained, no signs of injury noted during shift. Seizure precautions maintained. Patient repositioned independently/ with assistance for comfort, bed locked in low position with side rails X 4, bed alarm set, with call light within reach. Instructed patient to call staff for mobility, verbalized understanding.  No acute signs of distress noted at this time.

## 2024-03-19 NOTE — PROGRESS NOTES
EEG Hook up  AM Check Electrodes had to be fixed.No    Skin Integrity: Normal     Eduardo Robertson   03/19/2024 9:03 AM

## 2024-03-19 NOTE — ASSESSMENT & PLAN NOTE
Remote history of IPH ~22 years ago  - MRI Brain Epilepsy protocol with focal right temporal gyrus encephalomalacia and right hippocampal volume loss  - MRI reviewed and discussed with patient and SO

## 2024-03-19 NOTE — SUBJECTIVE & OBJECTIVE
Interval History: NAEON. This AM, patient sitting upright in bed with SO at bedside. Patient reports poor sleep overnight and mild headache this morning. Declines analgesics. No typical events. Discussed EEG findings and plan of care, including AED adjustments. Also reviewed outpatient MRI. Answered questions at bedside.    Current Facility-Administered Medications   Medication Dose Route Frequency Provider Last Rate Last Admin    acetaminophen tablet 650 mg  650 mg Oral Q6H PRN Kirsten Dallas PA-C        docusate sodium capsule 50 mg  50 mg Oral Daily PRN Kirsten Dallas PA-C        levETIRAcetam tablet 750 mg  750 mg Oral BID Kirsten Dallas PA-C        LORazepam injection 2 mg  2 mg Intravenous Q5 Min PRN Ju Eaton MD        losartan tablet 50 mg  50 mg Oral Daily Ju Eaton MD   50 mg at 03/19/24 0858    magnesium gluconate tablet 27 mg  27 mg Oral Daily Ju Eaton MD   27 mg at 03/19/24 0858    NIFEdipine 24 hr tablet 30 mg  30 mg Oral BID Ju Eaton MD   30 mg at 03/19/24 0858    ondansetron disintegrating tablet 4 mg  4 mg Oral Q6H PRN Kirsten Dallas PA-C        sodium chloride 0.9% flush 10 mL  10 mL Intravenous PRN Ju Eaton MD         Continuous Infusions:    Review of Systems  Objective:     Vital Signs (Most Recent):  Temp: 97.7 °F (36.5 °C) (03/19/24 0801)  Pulse: 80 (03/19/24 0911)  Resp: 19 (03/19/24 0911)  BP: 119/71 (03/19/24 0911)  SpO2: 96 % (03/19/24 0911) Vital Signs (24h Range):  Temp:  [97.6 °F (36.4 °C)-98.2 °F (36.8 °C)] 97.7 °F (36.5 °C)  Pulse:  [60-80] 80  Resp:  [10-20] 19  SpO2:  [94 %-99 %] 96 %  BP: (106-120)/(62-72) 119/71     Weight: 80.3 kg (177 lb 0.5 oz)  Body mass index is 26.92 kg/m².     Physical Exam  Vitals reviewed.   Constitutional:       General: She is not in acute distress.     Appearance: She is not diaphoretic.   HENT:      Head: Normocephalic and atraumatic.      Comments: EEG in place  Eyes:       General: No scleral icterus.        Right eye: No discharge.         Left eye: No discharge.      Extraocular Movements: Extraocular movements intact and EOM normal.      Conjunctiva/sclera: Conjunctivae normal.      Pupils: Pupils are equal, round, and reactive to light.   Cardiovascular:      Rate and Rhythm: Normal rate.   Pulmonary:      Effort: Pulmonary effort is normal. No respiratory distress.   Skin:     General: Skin is warm and dry.   Neurological:      Mental Status: She is alert and oriented to person, place, and time. Mental status is at baseline.   Psychiatric:         Mood and Affect: Mood normal.         Speech: Speech normal.         Behavior: Behavior normal.            NEUROLOGICAL EXAMINATION:     MENTAL STATUS   Oriented to person, place, and time.   Attention: normal. Concentration: normal.   Speech: speech is normal   Level of consciousness: alert    CRANIAL NERVES     CN II   Visual fields full to confrontation.     CN III, IV, VI   Pupils are equal, round, and reactive to light.  Extraocular motions are normal.     CN V   Right corneal reflex: normal  Left corneal reflex: normal    CN VII   Facial expression full, symmetric.     CN VIII   Hearing: intact    CN XI   CN XI normal.     CN XII   CN XII normal.       Significant Labs: All pertinent lab results from the past 24 hours have been reviewed.    Significant Studies: I have reviewed all pertinent imaging results/findings within the past 24 hours.

## 2024-03-19 NOTE — ASSESSMENT & PLAN NOTE
68F PMHx of HFrEF and right temporal hemorrhage in 2002 complicated by seizure disorder managed by Dr. Gamez currently maintained on Cenobamate 200 mg qhs, Topiramate XR 50 mg BID, and Levetiracetam 1500 mg BID referred to EMU for event capture and characterization of seizures that have persisted despite AED escalation in addition to medication optimization due to side effects from Topiramate and Cenobamate. Previous EEG noted right temporal slowing. MRI Brain Epilepsy protocol with focal right temporal gyrus encephalomalacia and right hippocampal volume loss. Aura described as chest sensation that progresses to throat associated with abnormal smell and taste. Seizures described as flushing, diaphoresis, popped veins in neck and face, staring with loss of awareness. Last seizure on Saturday, 3/16/24.     - Continuous vEEG   - Held home Cenobamate (last dose 3/17 PM) and Topiramate XR (last dose 3/18 AM) on admit  - Levetiracetam decreased to 750 mg x1 then hold  - AED levels pending  - Activation procedures per protocol- medication adjustments as above  - IV Ativan PRN for GTC greater than 5 min - please call epilepsy on call   - Seizure precautions, suction and oxygen at bedside  - Fall precautions, side rail padding in place  - Visi monitoring with continuous pulse oximetry   - Telemetry  - Discontinue Cenobamate and Topiramate XR on discharge due to side effects; plan to discharge on Levetiracetam +/- additional AED pending event capture

## 2024-03-19 NOTE — PROGRESS NOTES
[facility] - EMU ([facility])  Neurology-Epilepsy  Progress Note    Patient Name: Haydee Linda  MRN: [id number]  Admission Date: 3/18/2024  Hospital Length of Stay: 1 days  Code Status: Full Code   Attending Provider: Jostin Moura*  Primary Care Physician: No, Primary Doctor   Principal Problem:Secondary seizure disorder    Subjective:     Hospital Course:   3/18>3/19: Admit to EMU. Held home Cenobamate and Topiramate XR on admit, continue Levetiracetam. No typical events. EEG with right temporal slowing, no epileptiform activity, no electrographic seizures. Proceed for event capture. Decrease Levetiracetam to 750 mg x1 then hold.    Interval History: NAEON. This AM, patient sitting upright in bed with SO at bedside. Patient reports poor sleep overnight and mild headache this morning. Declines analgesics. No typical events. Discussed EEG findings and plan of care, including AED adjustments. Also reviewed outpatient MRI. Answered questions at bedside.    Current Facility-Administered Medications   Medication Dose Route Frequency Provider Last Rate Last Admin    acetaminophen tablet 650 mg  650 mg Oral Q6H PRN Kirsten Dallas PA-C        docusate sodium capsule 50 mg  50 mg Oral Daily PRN Kirsten Dallas PA-C        levETIRAcetam tablet 750 mg  750 mg Oral BID Kirsten Dallas PA-C        LORazepam injection 2 mg  2 mg Intravenous Q5 Min PRN Ju Eaton MD        losartan tablet 50 mg  50 mg Oral Daily Ju Eaton MD   50 mg at 03/19/24 0858    magnesium gluconate tablet 27 mg  27 mg Oral Daily Ju Eaton MD   27 mg at 03/19/24 0858    NIFEdipine 24 hr tablet 30 mg  30 mg Oral BID Ju Eaton MD   30 mg at 03/19/24 0858    ondansetron disintegrating tablet 4 mg  4 mg Oral Q6H PRN Kirsten Dallas PA-C        sodium chloride 0.9% flush 10 mL  10 mL Intravenous PRN Ju Eaton MD         Continuous Infusions:    Review of Systems  Objective:      Vital Signs (Most Recent):  Temp: 97.7 °F (36.5 °C) (03/19/24 0801)  Pulse: 80 (03/19/24 0911)  Resp: 19 (03/19/24 0911)  BP: 119/71 (03/19/24 0911)  SpO2: 96 % (03/19/24 0911) Vital Signs (24h Range):  Temp:  [97.6 °F (36.4 °C)-98.2 °F (36.8 °C)] 97.7 °F (36.5 °C)  Pulse:  [60-80] 80  Resp:  [10-20] 19  SpO2:  [94 %-99 %] 96 %  BP: (106-120)/(62-72) 119/71     Weight: 80.3 kg (177 lb 0.5 oz)  Body mass index is 26.92 kg/m².     Physical Exam  Vitals reviewed.   Constitutional:       General: She is not in acute distress.     Appearance: She is not diaphoretic.   HENT:      Head: Normocephalic and atraumatic.      Comments: EEG in place  Eyes:      General: No scleral icterus.        Right eye: No discharge.         Left eye: No discharge.      Extraocular Movements: Extraocular movements intact and EOM normal.      Conjunctiva/sclera: Conjunctivae normal.      Pupils: Pupils are equal, round, and reactive to light.   Cardiovascular:      Rate and Rhythm: Normal rate.   Pulmonary:      Effort: Pulmonary effort is normal. No respiratory distress.   Skin:     General: Skin is warm and dry.   Neurological:      Mental Status: She is alert and oriented to person, place, and time. Mental status is at baseline.   Psychiatric:         Mood and Affect: Mood normal.         Speech: Speech normal.         Behavior: Behavior normal.            NEUROLOGICAL EXAMINATION:     MENTAL STATUS   Oriented to person, place, and time.   Attention: normal. Concentration: normal.   Speech: speech is normal   Level of consciousness: alert    CRANIAL NERVES     CN II   Visual fields full to confrontation.     CN III, IV, VI   Pupils are equal, round, and reactive to light.  Extraocular motions are normal.     CN V   Right corneal reflex: normal  Left corneal reflex: normal    CN VII   Facial expression full, symmetric.     CN VIII   Hearing: intact    CN XI   CN XI normal.     CN XII   CN XII normal.       Significant Labs: All  pertinent lab results from the past 24 hours have been reviewed.    Significant Studies: I have reviewed all pertinent imaging results/findings within the past 24 hours.  Assessment and Plan:     * Secondary seizure disorder  68F PMHx of HFrEF and right temporal hemorrhage in 2002 complicated by seizure disorder managed by Dr. Gamez currently maintained on Cenobamate 200 mg qhs, Topiramate XR 50 mg BID, and Levetiracetam 1500 mg BID referred to EMU for event capture and characterization of seizures that have persisted despite AED escalation in addition to medication optimization due to side effects from Topiramate and Cenobamate. Previous EEG noted right temporal slowing. MRI Brain Epilepsy protocol with focal right temporal gyrus encephalomalacia and right hippocampal volume loss. Aura described as chest sensation that progresses to throat associated with abnormal smell and taste. Seizures described as flushing, diaphoresis, popped veins in neck and face, staring with loss of awareness. Last seizure on Saturday, 3/16/24.     - Continuous vEEG   - Held home Cenobamate (last dose 3/17 PM) and Topiramate XR (last dose 3/18 AM) on admit  - Levetiracetam decreased to 750 mg x1 then hold  - AED levels pending  - Activation procedures per protocol- medication adjustments as above  - IV Ativan PRN for GTC greater than 5 min - please call epilepsy on call   - Seizure precautions, suction and oxygen at bedside  - Fall precautions, side rail padding in place  - Visi monitoring with continuous pulse oximetry   - Telemetry  - Discontinue Cenobamate and Topiramate XR on discharge due to side effects; plan to discharge on Levetiracetam +/- additional AED pending event capture    History of spontaneous intraparenchymal intracranial hemorrhage  Remote history of IPH ~22 years ago  - MRI Brain Epilepsy protocol with focal right temporal gyrus encephalomalacia and right hippocampal volume loss  - MRI reviewed and discussed with  patient and SO    Congestive heart failure  Patient reports most recent echo showed systolic and diastolic dysfunction, EF 40-45%  - No s/s of exacerbation, stable on RA  - Continue home Nifedipine 30 mg BID and Losartan 50 mg daily  - Telemetry        VTE Risk Mitigation (From admission, onward)           Ordered     IP VTE HIGH RISK PATIENT  Once         03/18/24 1314     Place sequential compression device  Until discontinued         03/18/24 1314                    Kirsten Dallas PA-C  Neurology-Epilepsy  [facility] - EMU  Staff: Dr. Moura

## 2024-03-20 ENCOUNTER — DOCUMENTATION ONLY (OUTPATIENT)
Dept: NEUROLOGY | Facility: CLINIC | Age: 69
End: 2024-03-20
Payer: MEDICARE

## 2024-03-20 PROBLEM — R07.89 OTHER CHEST PAIN: Status: ACTIVE | Noted: 2024-03-20

## 2024-03-20 LAB
OHS QRS DURATION: 88 MS
OHS QTC CALCULATION: 436 MS
TOPIRAMATE SERPL-MCNC: 2.3 MCG/ML
TROPONIN I SERPL DL<=0.01 NG/ML-MCNC: <0.006 NG/ML (ref 0–0.03)

## 2024-03-20 PROCEDURE — 95714 VEEG EA 12-26 HR UNMNTR: CPT

## 2024-03-20 PROCEDURE — 99499 UNLISTED E&M SERVICE: CPT | Mod: ,,, | Performed by: PHYSICIAN ASSISTANT

## 2024-03-20 PROCEDURE — 36415 COLL VENOUS BLD VENIPUNCTURE: CPT | Performed by: STUDENT IN AN ORGANIZED HEALTH CARE EDUCATION/TRAINING PROGRAM

## 2024-03-20 PROCEDURE — 95720 EEG PHY/QHP EA INCR W/VEEG: CPT | Mod: ,,, | Performed by: STUDENT IN AN ORGANIZED HEALTH CARE EDUCATION/TRAINING PROGRAM

## 2024-03-20 PROCEDURE — 11000001 HC ACUTE MED/SURG PRIVATE ROOM

## 2024-03-20 PROCEDURE — 93005 ELECTROCARDIOGRAM TRACING: CPT

## 2024-03-20 PROCEDURE — 99233 SBSQ HOSP IP/OBS HIGH 50: CPT | Mod: FS,,, | Performed by: STUDENT IN AN ORGANIZED HEALTH CARE EDUCATION/TRAINING PROGRAM

## 2024-03-20 PROCEDURE — 84484 ASSAY OF TROPONIN QUANT: CPT | Performed by: STUDENT IN AN ORGANIZED HEALTH CARE EDUCATION/TRAINING PROGRAM

## 2024-03-20 PROCEDURE — 25000003 PHARM REV CODE 250: Performed by: STUDENT IN AN ORGANIZED HEALTH CARE EDUCATION/TRAINING PROGRAM

## 2024-03-20 PROCEDURE — 93010 ELECTROCARDIOGRAM REPORT: CPT | Mod: ,,, | Performed by: INTERNAL MEDICINE

## 2024-03-20 RX ORDER — DIPHENHYDRAMINE HCL 50 MG
50 CAPSULE ORAL ONCE
Status: COMPLETED | OUTPATIENT
Start: 2024-03-21 | End: 2024-03-21

## 2024-03-20 RX ORDER — TRAMADOL HYDROCHLORIDE 50 MG/1
50 TABLET ORAL ONCE
Status: COMPLETED | OUTPATIENT
Start: 2024-03-21 | End: 2024-03-21

## 2024-03-20 RX ADMIN — NIFEDIPINE 30 MG: 30 TABLET, FILM COATED, EXTENDED RELEASE ORAL at 09:03

## 2024-03-20 RX ADMIN — Medication 27 MG: at 09:03

## 2024-03-20 RX ADMIN — LOSARTAN POTASSIUM 50 MG: 50 TABLET, FILM COATED ORAL at 10:03

## 2024-03-20 NOTE — NURSING
Called MD (Manpreet Ace) to inform them that the pt is complaining of 5/10 CP radiating under her left breast, along with palpations. Asked how he would like us to proceed at this time.     Per MD: order stat troponin and an EKG

## 2024-03-20 NOTE — ASSESSMENT & PLAN NOTE
Patient reports most recent echo showed systolic and diastolic dysfunction, EF 40-45%  Per chart review from 8/2022, single PATRICK ventriculogram was performed revealing EF 55-60%   - No s/s of exacerbation, stable on RA  - Continue home Nifedipine 30 mg BID and Losartan 50 mg daily  - Telemetry

## 2024-03-20 NOTE — ASSESSMENT & PLAN NOTE
68F PMHx of HFrEF and right temporal hemorrhage in 2002 complicated by seizure disorder managed by Dr. Gamez currently maintained on Cenobamate 200 mg qhs, Topiramate XR 50 mg BID, and Levetiracetam 1500 mg BID referred to EMU for event capture and characterization of seizures that have persisted despite AED escalation in addition to medication optimization due to side effects from Topiramate and Cenobamate. Previous EEG noted right temporal slowing. MRI Brain Epilepsy protocol with focal right temporal gyrus encephalomalacia and right hippocampal volume loss. Aura described as chest sensation that progresses to throat associated with abnormal smell and taste. Seizures described as flushing, diaphoresis, popped veins in neck and face, staring with loss of awareness. Last seizure on Saturday, 3/16/24.     - Continuous vEEG   - Held home Cenobamate (last dose 3/17 PM) and Topiramate XR (last dose 3/18 AM) on admit, Levetiracetam decreased to 750 mg x1 then hold 3/20  - AED levels pending  - Activation procedures per protocol- medication adjustments as above, HV/PS today, sleep deprivation tonight, benadryl/tramadol 3/21 AM  - IV Ativan PRN for GTC greater than 5 min - please call epilepsy on call   - Seizure precautions, suction and oxygen at bedside  - Fall precautions, side rail padding in place  - Visi monitoring with continuous pulse oximetry   - Telemetry  - Discontinue Cenobamate and Topiramate XR on discharge due to side effects; plan to discharge on Levetiracetam +/- additional AED pending event capture

## 2024-03-20 NOTE — PLAN OF CARE
Problem: Adult Inpatient Plan of Care  Goal: Plan of Care Review  Outcome: Ongoing, Progressing  Goal: Patient-Specific Goal (Individualized)  Outcome: Ongoing, Progressing  Goal: Absence of Hospital-Acquired Illness or Injury  Outcome: Ongoing, Progressing  Goal: Optimal Comfort and Wellbeing  Outcome: Ongoing, Progressing  Goal: Readiness for Transition of Care  Outcome: Ongoing, Progressing     Problem: Seizure, Active Management  Goal: Absence of Seizure/Seizure-Related Injury  Outcome: Ongoing, Progressing     Bed in lowest position, bed alarm on, call light within reach and pt instructed to call if they need to get up or need any assistance. No sz like activity over night.

## 2024-03-20 NOTE — ASSESSMENT & PLAN NOTE
Per chart review, [facility] 8/2022 with widely patent coronary arteries with preserved systolic function; the left main is angiographically normal, left anterior descending is angiographically normal, circumflex is angiographically normal, right coronary artery is a dominant vessel of the heart and angiographically normal  - Episodes of chest pain with no associated symptoms that resolved spontaneously  - EKG stable, troponin negative  - Telemetry

## 2024-03-20 NOTE — PROGRESS NOTES
David Nicole - EMU ([facility])  Neurology-Epilepsy  Progress Note    Patient Name: Haydee Linda  MRN: [id number]  Admission Date: 3/18/2024  Hospital Length of Stay: 2 days  Code Status: Full Code   Attending Provider: Jostin Moura*  Primary Care Physician: No, Primary Doctor   Principal Problem:Secondary seizure disorder    Subjective:     Hospital Course:   3/18>3/19: Admit to EMU. Held home Cenobamate and Topiramate XR on admit, continue Levetiracetam. No typical events. EEG with right temporal slowing, no epileptiform activity, no electrographic seizures. Proceed for event capture. Decrease Levetiracetam to 750 mg x1 then hold.  3/19>3/20: Episode of chest pain, EKG stable and troponin negative. No typical events. EEG with right > left temporal slowing, no epileptiform activity. Proceed with provoking measures for event capture- HV/PS today, sleep deprivation tonight, activation medications 3/21 AM.    Interval History: Episode of chest pain early AM with no associated symptoms, resolved spontaneously. EKG stable and troponin negative. This AM, patient sitting upright in bed with SO. Denies complaints this morning. No typical events. Discussed EEG findings and plan of care, including HV/PS today, sleep deprivation tonight, benadryl/tramadol 3/21 AM. Answered questions at bedside.    Current Facility-Administered Medications   Medication Dose Route Frequency Provider Last Rate Last Admin    acetaminophen tablet 650 mg  650 mg Oral Q6H PRN Ju Eaton MD        [START ON 3/21/2024] diphenhydrAMINE capsule 50 mg  50 mg Oral Once Kirsten Dallas PA-C        docusate sodium capsule 50 mg  50 mg Oral Daily PRN Kirsten Dallas PA-C        LORazepam injection 2 mg  2 mg Intravenous Q5 Min PRN Ju Eaton MD        losartan tablet 50 mg  50 mg Oral Daily Ju Eaton MD   50 mg at 03/20/24 1037    magnesium gluconate tablet 27 mg  27 mg Oral BID Ju Eaton,  MD   27 mg at 03/20/24 0900    NIFEdipine 24 hr tablet 30 mg  30 mg Oral BID Ju Eaton MD   30 mg at 03/20/24 0900    ondansetron disintegrating tablet 4 mg  4 mg Oral Q6H PRN Kirsten Dallas PA-C        sodium chloride 0.9% flush 10 mL  10 mL Intravenous PRN Ju Eaton MD        [START ON 3/21/2024] traMADoL tablet 50 mg  50 mg Oral Once Kirsten Dallas PA-C         Continuous Infusions:    Review of Systems  Objective:     Vital Signs (Most Recent):  Temp: 97.7 °F (36.5 °C) (03/20/24 0717)  Pulse: 76 (03/20/24 0717)  Resp: 20 (03/20/24 0717)  BP: 112/78 (03/20/24 1037)  SpO2: 97 % (03/20/24 0717) Vital Signs (24h Range):  Temp:  [97.5 °F (36.4 °C)-98.3 °F (36.8 °C)] 97.7 °F (36.5 °C)  Pulse:  [66-89] 76  Resp:  [12-20] 20  SpO2:  [96 %-97 %] 97 %  BP: (103-115)/(63-78) 112/78     Weight: 80.3 kg (177 lb 0.5 oz)  Body mass index is 26.92 kg/m².     Physical Exam  Vitals reviewed.   Constitutional:       General: She is not in acute distress.     Appearance: She is not diaphoretic.   HENT:      Head: Normocephalic and atraumatic.      Comments: EEG in place  Eyes:      General: No scleral icterus.        Right eye: No discharge.         Left eye: No discharge.      Extraocular Movements: Extraocular movements intact and EOM normal.      Conjunctiva/sclera: Conjunctivae normal.      Pupils: Pupils are equal, round, and reactive to light.   Cardiovascular:      Rate and Rhythm: Normal rate.   Pulmonary:      Effort: Pulmonary effort is normal. No respiratory distress.   Skin:     General: Skin is warm and dry.   Neurological:      Mental Status: She is alert and oriented to person, place, and time. Mental status is at baseline.   Psychiatric:         Mood and Affect: Mood normal.         Speech: Speech normal.         Behavior: Behavior normal.            NEUROLOGICAL EXAMINATION:     MENTAL STATUS   Oriented to person, place, and time.   Attention: normal. Concentration: normal.   Speech:  speech is normal   Level of consciousness: alert    CRANIAL NERVES     CN II   Visual fields full to confrontation.     CN III, IV, VI   Pupils are equal, round, and reactive to light.  Extraocular motions are normal.     CN V   Right corneal reflex: normal  Left corneal reflex: normal    CN VII   Facial expression full, symmetric.     CN VIII   Hearing: intact    CN XI   CN XI normal.     CN XII   CN XII normal.       Significant Labs: All pertinent lab results from the past 24 hours have been reviewed.    Significant Studies: I have reviewed all pertinent imaging results/findings within the past 24 hours.  Assessment and Plan:     * Secondary seizure disorder  68F PMHx of HFrEF and right temporal hemorrhage in 2002 complicated by seizure disorder managed by Dr. Gamez currently maintained on Cenobamate 200 mg qhs, Topiramate XR 50 mg BID, and Levetiracetam 1500 mg BID referred to EMU for event capture and characterization of seizures that have persisted despite AED escalation in addition to medication optimization due to side effects from Topiramate and Cenobamate. Previous EEG noted right temporal slowing. MRI Brain Epilepsy protocol with focal right temporal gyrus encephalomalacia and right hippocampal volume loss. Aura described as chest sensation that progresses to throat associated with abnormal smell and taste. Seizures described as flushing, diaphoresis, popped veins in neck and face, staring with loss of awareness. Last seizure on Saturday, 3/16/24.     - Continuous vEEG   - Held home Cenobamate (last dose 3/17 PM) and Topiramate XR (last dose 3/18 AM) on admit, Levetiracetam decreased to 750 mg x1 then hold 3/20  - AED levels pending  - Activation procedures per protocol- medication adjustments as above, HV/PS today, sleep deprivation tonight, benadryl/tramadol 3/21 AM  - IV Ativan PRN for GTC greater than 5 min - please call epilepsy on call   - Seizure precautions, suction and oxygen at bedside  - Fall  precautions, side rail padding in place  - Visi monitoring with continuous pulse oximetry   - Telemetry  - Discontinue Cenobamate and Topiramate XR on discharge due to side effects; plan to discharge on Levetiracetam +/- additional AED pending event capture    Other chest pain  Per chart review, [facility] 8/2022 with widely patent coronary arteries with preserved systolic function; the left main is angiographically normal, left anterior descending is angiographically normal, circumflex is angiographically normal, right coronary artery is a dominant vessel of the heart and angiographically normal  - Episodes of chest pain with no associated symptoms that resolved spontaneously  - EKG stable, troponin negative  - Telemetry    History of spontaneous intraparenchymal intracranial hemorrhage  Remote history of IPH ~22 years ago  - MRI Brain Epilepsy protocol with focal right temporal gyrus encephalomalacia and right hippocampal volume loss  - MRI reviewed and discussed with patient and SO    Congestive heart failure  Patient reports most recent echo showed systolic and diastolic dysfunction, EF 40-45%  Per chart review from 8/2022, single PATRICK ventriculogram was performed revealing EF 55-60%   - No s/s of exacerbation, stable on RA  - Continue home Nifedipine 30 mg BID and Losartan 50 mg daily  - Telemetry        VTE Risk Mitigation (From admission, onward)           Ordered     IP VTE HIGH RISK PATIENT  Once         03/18/24 1314     Place sequential compression device  Until discontinued         03/18/24 1314                    Kirsten Dallas PA-C  Neurology-Epilepsy  [facility] - [facility]  Staff: Dr. Moura

## 2024-03-20 NOTE — PROCEDURES
VIDEO ELECTROENCEPHALOGRAM  REPORT    DATE OF SERVICE:3/19/24-3/20/24  EEG NUMBER: U -1  REQUESTED BY:  Dr Gladis Gamez  LOCATION OF SERVICE:  [facility]    METHODOLOGY   Electroencephalographic (EEG) recording is with electrodes placed according to the International 10-20 placement system.  Thirty two (32) channels of digital signal (sampling rate of 512/sec) including T1 and T2 was simultaneously recorded from the scalp and may include  EKG, EMG, and/or eye monitors.  Recording band pass was 0.1 to 512 hz.  Digital video recording of the patient is simultaneously recorded with the EEG.  The patient is instructed report clinical symptoms which may occur during the recording session.  EEG and video recording is stored and archived in digital format.  Activation procedures which include photic stimulation, hyperventilation and instructing patients to perform simple task are done in selected patients.   The EEG is displayed on a monitor screen and can be reviewed using different montages.  Computer assisted analysis is employed to detect spike and electrographic seizure activity.   The entire record is submitted for computer analysis.  The entire recording is visually reviewed and the times identified by computer analysis as being spikes or seizures are reviewed again.  Compresses spectral analysis (CSA) is also performed on the activity recorded from each individual channel.  This is displayed as a power display of frequencies from 0 to 30 Hz over time.   The CSA is reviewed looking for asymmetries in power between homologous areas of the scalp and then compared with the original EEG recording.     [employer] software was also utilized in the review of this study.  This software suite analyzes the EEG recording in multiple domains.  Coherence and rhythmicity is computed to identify EEG sections which may contain organized seizures.  Each channel undergoes analysis to detect presence of spike and sharp waves which have  special and morphological characteristic of epileptic activity.  The routine EEG recording is converted from spacial into frequency domain.  This is then displayed comparing homologous areas to identify areas of significant asymmetry.  Algorithm to identify non-cortically generated artifact is used to separate eye movement, EMG and other artifact from the EEG.      ELECTROENCEPHALOGRAM:    RECORDING TIMES:  Start on 3/19/24 at 07:00  Stop on 3/20/24 at 07:00    A total of 24 hours of EEG recording was obtained.    Indication: 68 year old female with history of prior right sided temporal ICH (2002) with subsequent seizures, migraines, heart failure presenting for spell characterization and medication optimization.  Outpatient regimen is Cenobamate 200 mg daily, Topiramate ER 50 mg BID, and Keppra 1500 mg BID.During this record, her home cenobamate and topiramate ER have been held and Keppra has been decreased to 750 mg starting evening of 3/19.     State of Consciousness:   Awake and asleep    Background:   The background is well organized, symmetric and continuous.  There is a normal anterior to posterior gradient consisting of 5-10 mcV amplitude beta activity in the frontal region and well defined alpha activity in the posterior region.   There is a well developed 30-70 uV, 9-10 Hz posterior dominant rhythm that is symmetric and reactive to eye opening and closure appreciated at maximum alertness.  Intermittent polymorphic theta range slowing is observed in the right > left temporal region.    Sleep:   The patient reaches stage 2 sleep with symmetric vertex waves, sleep spindles, and k complexes noted. During sleep there is occasional activity in the temporal regions that have the appearance of wickets.      Epileptiform Abnormalities  None    Seizures/Events:   None    EKG:   Regular rate and rhythm on single lead EKG    Activating procedures:   Hyperventilation and photic stimulation are not performed      Impression:   This is an abnormal awake and sleep EEG due to the presence of intermittent bilateral (right > left) temporal slowing consistent with focal dysfunction in these regions.  No epileptiform discharges or clinical events are observed.     EMU summary:  3/18-3/19: right temporal slowing, no seizures or clinical events  3/19-3/20: right > left temporal slowing, no seizures or clinical events     Shreya Moura MD  [facility]   Department of Neurology/Epilepsy

## 2024-03-20 NOTE — PLAN OF CARE
PoC reviewed and updated with the patient/spouse. VSS, see flow-sheets. Tele maintained per order.  VISI applied/ maintained for additional monitoring. Patient AOx4. Continuous EEG in place. Fall/safety precautions maintained, no signs of injury noted during shift. Seizure precautions maintained. Patient repositioned independently/ with assistance for comfort, bed locked in low position with side rails X 4, bed alarm set, with call light within reach. Instructed patient to call staff for mobility, verbalized understanding.     -Areli Prajapati    Problem: Adult Inpatient Plan of Care  Goal: Plan of Care Review  Outcome: Ongoing, Progressing  Goal: Patient-Specific Goal (Individualized)  Outcome: Ongoing, Progressing  Goal: Absence of Hospital-Acquired Illness or Injury  Outcome: Ongoing, Progressing  Goal: Optimal Comfort and Wellbeing  Outcome: Ongoing, Progressing  Goal: Readiness for Transition of Care  Outcome: Ongoing, Progressing     Problem: Seizure, Active Management  Goal: Absence of Seizure/Seizure-Related Injury  Outcome: Ongoing, Progressing     Problem: Fall Injury Risk  Goal: Absence of Fall and Fall-Related Injury  Outcome: Ongoing, Progressing

## 2024-03-20 NOTE — SUBJECTIVE & OBJECTIVE
Interval History: Episode of chest pain early AM with no associated symptoms, resolved spontaneously. EKG stable and troponin negative. This AM, patient sitting upright in bed with SO. Denies complaints this morning. No typical events. Discussed EEG findings and plan of care, including HV/PS today, sleep deprivation tonight, benadryl/tramadol 3/21 AM. Answered questions at bedside.    Current Facility-Administered Medications   Medication Dose Route Frequency Provider Last Rate Last Admin    acetaminophen tablet 650 mg  650 mg Oral Q6H PRN Ju Eaton MD        [START ON 3/21/2024] diphenhydrAMINE capsule 50 mg  50 mg Oral Once Kirsten Dallas PA-C        docusate sodium capsule 50 mg  50 mg Oral Daily PRN Kirsten Dallas PA-C        LORazepam injection 2 mg  2 mg Intravenous Q5 Min PRN Ju Eaton MD        losartan tablet 50 mg  50 mg Oral Daily Ju Eaton MD   50 mg at 03/20/24 1037    magnesium gluconate tablet 27 mg  27 mg Oral BID Ju Eaton MD   27 mg at 03/20/24 0900    NIFEdipine 24 hr tablet 30 mg  30 mg Oral BID Ju Eaton MD   30 mg at 03/20/24 0900    ondansetron disintegrating tablet 4 mg  4 mg Oral Q6H PRN Kirsten Dallas PA-C        sodium chloride 0.9% flush 10 mL  10 mL Intravenous PRN Ju Eaton MD        [START ON 3/21/2024] traMADoL tablet 50 mg  50 mg Oral Once Kirsten Dallas PA-C         Continuous Infusions:    Review of Systems  Objective:     Vital Signs (Most Recent):  Temp: 97.7 °F (36.5 °C) (03/20/24 0717)  Pulse: 76 (03/20/24 0717)  Resp: 20 (03/20/24 0717)  BP: 112/78 (03/20/24 1037)  SpO2: 97 % (03/20/24 0717) Vital Signs (24h Range):  Temp:  [97.5 °F (36.4 °C)-98.3 °F (36.8 °C)] 97.7 °F (36.5 °C)  Pulse:  [66-89] 76  Resp:  [12-20] 20  SpO2:  [96 %-97 %] 97 %  BP: (103-115)/(63-78) 112/78     Weight: 80.3 kg (177 lb 0.5 oz)  Body mass index is 26.92 kg/m².     Physical Exam  Vitals reviewed.    Constitutional:       General: She is not in acute distress.     Appearance: She is not diaphoretic.   HENT:      Head: Normocephalic and atraumatic.      Comments: EEG in place  Eyes:      General: No scleral icterus.        Right eye: No discharge.         Left eye: No discharge.      Extraocular Movements: Extraocular movements intact and EOM normal.      Conjunctiva/sclera: Conjunctivae normal.      Pupils: Pupils are equal, round, and reactive to light.   Cardiovascular:      Rate and Rhythm: Normal rate.   Pulmonary:      Effort: Pulmonary effort is normal. No respiratory distress.   Skin:     General: Skin is warm and dry.   Neurological:      Mental Status: She is alert and oriented to person, place, and time. Mental status is at baseline.   Psychiatric:         Mood and Affect: Mood normal.         Speech: Speech normal.         Behavior: Behavior normal.            NEUROLOGICAL EXAMINATION:     MENTAL STATUS   Oriented to person, place, and time.   Attention: normal. Concentration: normal.   Speech: speech is normal   Level of consciousness: alert    CRANIAL NERVES     CN II   Visual fields full to confrontation.     CN III, IV, VI   Pupils are equal, round, and reactive to light.  Extraocular motions are normal.     CN V   Right corneal reflex: normal  Left corneal reflex: normal    CN VII   Facial expression full, symmetric.     CN VIII   Hearing: intact    CN XI   CN XI normal.     CN XII   CN XII normal.       Significant Labs: All pertinent lab results from the past 24 hours have been reviewed.    Significant Studies: I have reviewed all pertinent imaging results/findings within the past 24 hours.

## 2024-03-20 NOTE — PROGRESS NOTES
EEG   AM Check Electrodes had to be fixed.No    Skin Integrity: Normal       HV.PS done    Sal Mirza   03/20/2024 9:48 AM

## 2024-03-21 LAB — LEVETIRACETAM SERPL-MCNC: 32.8 UG/ML (ref 3–60)

## 2024-03-21 PROCEDURE — 11000001 HC ACUTE MED/SURG PRIVATE ROOM

## 2024-03-21 PROCEDURE — 99499 UNLISTED E&M SERVICE: CPT | Mod: ,,, | Performed by: STUDENT IN AN ORGANIZED HEALTH CARE EDUCATION/TRAINING PROGRAM

## 2024-03-21 PROCEDURE — 99233 SBSQ HOSP IP/OBS HIGH 50: CPT | Mod: ,,, | Performed by: PHYSICIAN ASSISTANT

## 2024-03-21 PROCEDURE — 63600175 PHARM REV CODE 636 W HCPCS: Performed by: STUDENT IN AN ORGANIZED HEALTH CARE EDUCATION/TRAINING PROGRAM

## 2024-03-21 PROCEDURE — 95714 VEEG EA 12-26 HR UNMNTR: CPT

## 2024-03-21 PROCEDURE — 25000003 PHARM REV CODE 250: Performed by: PHYSICIAN ASSISTANT

## 2024-03-21 PROCEDURE — 25000003 PHARM REV CODE 250: Performed by: STUDENT IN AN ORGANIZED HEALTH CARE EDUCATION/TRAINING PROGRAM

## 2024-03-21 RX ORDER — CLOBAZAM 20 MG/1
20 TABLET ORAL NIGHTLY
Qty: 30 TABLET | Refills: 3 | Status: SHIPPED | OUTPATIENT
Start: 2024-03-21 | End: 2024-04-19 | Stop reason: SDUPTHER

## 2024-03-21 RX ORDER — LEVETIRACETAM 1000 MG/1
2000 TABLET ORAL 2 TIMES DAILY
Qty: 120 TABLET | Refills: 3 | Status: SHIPPED | OUTPATIENT
Start: 2024-03-21 | End: 2024-04-19 | Stop reason: SDUPTHER

## 2024-03-21 RX ORDER — LEVETIRACETAM 500 MG/5ML
2000 INJECTION, SOLUTION, CONCENTRATE INTRAVENOUS ONCE
Status: COMPLETED | OUTPATIENT
Start: 2024-03-21 | End: 2024-03-21

## 2024-03-21 RX ORDER — CLOBAZAM 10 MG/1
10 TABLET ORAL NIGHTLY
Status: DISCONTINUED | OUTPATIENT
Start: 2024-03-21 | End: 2024-03-22

## 2024-03-21 RX ORDER — LEVETIRACETAM 500 MG/5ML
500 INJECTION, SOLUTION, CONCENTRATE INTRAVENOUS ONCE
Status: COMPLETED | OUTPATIENT
Start: 2024-03-21 | End: 2024-03-21

## 2024-03-21 RX ORDER — CLOBAZAM 10 MG/1
10 TABLET ORAL ONCE
Status: COMPLETED | OUTPATIENT
Start: 2024-03-21 | End: 2024-03-21

## 2024-03-21 RX ORDER — CLOBAZAM 10 MG/1
20 TABLET ORAL NIGHTLY
Status: DISCONTINUED | OUTPATIENT
Start: 2024-03-21 | End: 2024-03-21

## 2024-03-21 RX ADMIN — CLOBAZAM 10 MG: 10 TABLET ORAL at 03:03

## 2024-03-21 RX ADMIN — LEVETIRACETAM 2000 MG: 500 TABLET, FILM COATED ORAL at 10:03

## 2024-03-21 RX ADMIN — CLOBAZAM 10 MG: 10 TABLET ORAL at 10:03

## 2024-03-21 RX ADMIN — TRAMADOL HYDROCHLORIDE 50 MG: 50 TABLET, COATED ORAL at 07:03

## 2024-03-21 RX ADMIN — Medication 27 MG: at 08:03

## 2024-03-21 RX ADMIN — LOSARTAN POTASSIUM 50 MG: 50 TABLET, FILM COATED ORAL at 08:03

## 2024-03-21 RX ADMIN — Medication 27 MG: at 10:03

## 2024-03-21 RX ADMIN — LEVETIRACETAM 2000 MG: 100 INJECTION INTRAVENOUS at 07:03

## 2024-03-21 RX ADMIN — DIPHENHYDRAMINE HYDROCHLORIDE 50 MG: 50 CAPSULE ORAL at 07:03

## 2024-03-21 RX ADMIN — NIFEDIPINE 30 MG: 30 TABLET, FILM COATED, EXTENDED RELEASE ORAL at 10:03

## 2024-03-21 RX ADMIN — LEVETIRACETAM 500 MG: 100 INJECTION INTRAVENOUS at 09:03

## 2024-03-21 RX ADMIN — NIFEDIPINE 30 MG: 30 TABLET, FILM COATED, EXTENDED RELEASE ORAL at 08:03

## 2024-03-21 NOTE — NURSING
7245- attempted to contact epilepsy MD to update her on tonight's events. No answer.   3285- attempted again to contact epilepsy MD no anwser at this time.

## 2024-03-21 NOTE — PLAN OF CARE
Problem: Adult Inpatient Plan of Care  Goal: Plan of Care Review  Outcome: Ongoing, Progressing  Goal: Patient-Specific Goal (Individualized)  Outcome: Ongoing, Progressing  Goal: Absence of Hospital-Acquired Illness or Injury  Outcome: Ongoing, Progressing  Goal: Optimal Comfort and Wellbeing  Outcome: Ongoing, Progressing  Goal: Readiness for Transition of Care  Outcome: Ongoing, Progressing     Problem: Seizure, Active Management  Goal: Absence of Seizure/Seizure-Related Injury  Outcome: Ongoing, Progressing     Problem: Fall Injury Risk  Goal: Absence of Fall and Fall-Related Injury  Outcome: Ongoing, Progressing    POC reviewed and updated with the patient/pt's spouse. Questions regarding POC were encouraged and addressed with the patient.  VSS, see flow-sheets. Tele maintained per order.  VISI applied/ maintained for additional monitoring. Patient is AO X 4 at this time. Continuous EEG in place. Fall/safety precautions maintained, no signs of injury noted during shift. Seizure precautions maintained. Patient repositioned independently, bed locked in low position with side rails X 4, bed alarm refused, with call light within reach. Instructed patient to call staff for mobility, verbalized understanding.  No acute signs of distress noted at this time. POC ongoing.      none

## 2024-03-21 NOTE — SUBJECTIVE & OBJECTIVE
Interval History: Successful sleep deprivation overnight, received benadryl/tramadol this morning. 4 typical events overnight c/w electroclinical seizures. This AM, patient sitting upright in bed with SO at bedside. Patient awake, alert, and has no complaints. Discussed EEG findings and plan of care at length, including starting new AED- Clobazam. Answered questions at bedside.    Current Facility-Administered Medications   Medication Dose Route Frequency Provider Last Rate Last Admin    acetaminophen tablet 650 mg  650 mg Oral Q6H PRN Ju Eaton MD        cloBAZam Tab 20 mg  20 mg Oral QHS Kirsten Dallas PA-C        docusate sodium capsule 50 mg  50 mg Oral Daily PRN Kirsten Dallas PA-C        levETIRAcetam tablet 2,000 mg  2,000 mg Oral BID iKrsten Dallas PA-C        LORazepam injection 2 mg  2 mg Intravenous Q5 Min PRN Ju Eaton MD        losartan tablet 50 mg  50 mg Oral Daily Ju Eaton MD   50 mg at 03/21/24 0837    magnesium gluconate tablet 27 mg  27 mg Oral BID Ju Eaton MD   27 mg at 03/21/24 0837    NIFEdipine 24 hr tablet 30 mg  30 mg Oral BID Ju Eaton MD   30 mg at 03/21/24 0837    ondansetron disintegrating tablet 4 mg  4 mg Oral Q6H PRN Kirsten Dallas PA-C        sodium chloride 0.9% flush 10 mL  10 mL Intravenous PRN Ju Eaton MD         Continuous Infusions:    Review of Systems  Objective:     Vital Signs (Most Recent):  Temp: 98.8 °F (37.1 °C) (03/21/24 1104)  Pulse: 79 (03/21/24 1104)  Resp: 18 (03/21/24 1104)  BP: 121/75 (03/21/24 1104)  SpO2: 96 % (03/21/24 1104) Vital Signs (24h Range):  Temp:  [97.9 °F (36.6 °C)-98.8 °F (37.1 °C)] 98.8 °F (37.1 °C)  Pulse:  [] 79  Resp:  [11-18] 18  SpO2:  [93 %-98 %] 96 %  BP: (101-164)/() 121/75     Weight: 80.3 kg (177 lb 0.5 oz)  Body mass index is 26.92 kg/m².     Physical Exam  Vitals reviewed.   Constitutional:       General: She is not in acute  distress.     Appearance: She is not diaphoretic.   HENT:      Head: Normocephalic and atraumatic.      Comments: EEG in place  Eyes:      General: No scleral icterus.        Right eye: No discharge.         Left eye: No discharge.      Extraocular Movements: Extraocular movements intact and EOM normal.      Conjunctiva/sclera: Conjunctivae normal.      Pupils: Pupils are equal, round, and reactive to light.   Cardiovascular:      Rate and Rhythm: Normal rate.   Pulmonary:      Effort: Pulmonary effort is normal. No respiratory distress.   Skin:     General: Skin is warm and dry.   Neurological:      Mental Status: She is alert and oriented to person, place, and time. Mental status is at baseline.   Psychiatric:         Mood and Affect: Mood normal.         Speech: Speech normal.         Behavior: Behavior normal.            NEUROLOGICAL EXAMINATION:     MENTAL STATUS   Oriented to person, place, and time.   Attention: normal. Concentration: normal.   Speech: speech is normal   Level of consciousness: alert    CRANIAL NERVES     CN II   Visual fields full to confrontation.     CN III, IV, VI   Pupils are equal, round, and reactive to light.  Extraocular motions are normal.     CN V   Right corneal reflex: normal  Left corneal reflex: normal    CN VII   Facial expression full, symmetric.     CN VIII   Hearing: intact    CN XI   CN XI normal.     CN XII   CN XII normal.       Significant Labs: All pertinent lab results from the past 24 hours have been reviewed.    Significant Studies: I have reviewed all pertinent imaging results/findings within the past 24 hours.

## 2024-03-21 NOTE — PLAN OF CARE
CHW met with patient/family at bedside. Patient experience rounding completed and reviewed the following.     Do you know your discharge plan? Yes or No,    If yes, what is the plan?    Yes Home     Have you discussed your needs and preferences with your SW/CM? Yes      If you are discharging home, do you have help at home? Yes     Do you think you will need help additional at home at discharge?   No     Do you currently have difficulty keeping up with bills, affording medicine or buying food?  No    Assigned SW/CM notified of any patient/family needs or concerns. Appropriate resources provided to address patient's needs.    Maritza Mckenzie W  Case Management  [phone]

## 2024-03-21 NOTE — NURSING
2240- Pt had a similar sz like event. Lasting 1min 40sec long. Button was pressed.   Pt also displaying slight jitters to BLUE post event. No change in neurostatus, VS stable, VISI on and continuously monitoring pt.

## 2024-03-21 NOTE — NURSING
0349- Sz like event after an aura. Presented similar to before, with the addition of initial nausea and chest tightness that subsided moments after the event. Event lasting 2 minutes long. See flowsheet for VS.

## 2024-03-21 NOTE — NURSING
Called MD Mildred) to notify of pts sz like event. Pt pressed her button and stated she felt an aura and felt as if her sz was coming on. Pts face became flush, experienced slight SOB, became diaphoretic and her hands were clammy. VS were checked and SBP was slightly elevated. No changes in neuro status. Event lasted about 1 minute long.     Per MD: no changes at this time, will check the EEG readings.

## 2024-03-21 NOTE — PROCEDURES
VIDEO ELECTROENCEPHALOGRAM  REPORT    DATE OF SERVICE:3/20/24-3/21/24  EEG NUMBER: U -1  REQUESTED BY:  Dr Gladis Gamez  LOCATION OF SERVICE:  [facility]    METHODOLOGY   Electroencephalographic (EEG) recording is with electrodes placed according to the International 10-20 placement system.  Thirty two (32) channels of digital signal (sampling rate of 512/sec) including T1 and T2 was simultaneously recorded from the scalp and may include  EKG, EMG, and/or eye monitors.  Recording band pass was 0.1 to 512 hz.  Digital video recording of the patient is simultaneously recorded with the EEG.  The patient is instructed report clinical symptoms which may occur during the recording session.  EEG and video recording is stored and archived in digital format.  Activation procedures which include photic stimulation, hyperventilation and instructing patients to perform simple task are done in selected patients.   The EEG is displayed on a monitor screen and can be reviewed using different montages.  Computer assisted analysis is employed to detect spike and electrographic seizure activity.   The entire record is submitted for computer analysis.  The entire recording is visually reviewed and the times identified by computer analysis as being spikes or seizures are reviewed again.  Compresses spectral analysis (CSA) is also performed on the activity recorded from each individual channel.  This is displayed as a power display of frequencies from 0 to 30 Hz over time.   The CSA is reviewed looking for asymmetries in power between homologous areas of the scalp and then compared with the original EEG recording.     [employer] software was also utilized in the review of this study.  This software suite analyzes the EEG recording in multiple domains.  Coherence and rhythmicity is computed to identify EEG sections which may contain organized seizures.  Each channel undergoes analysis to detect presence of spike and sharp waves which have  special and morphological characteristic of epileptic activity.  The routine EEG recording is converted from spacial into frequency domain.  This is then displayed comparing homologous areas to identify areas of significant asymmetry.  Algorithm to identify non-cortically generated artifact is used to separate eye movement, EMG and other artifact from the EEG.      ELECTROENCEPHALOGRAM:    RECORDING TIMES:  Start on 3/20/24 at 07:00  Stop on 3/21/24 at 07:00    A total of 24 hours of EEG recording was obtained.    Indication: 68 year old female with history of prior right sided temporal ICH (2002) with subsequent seizures, migraines, heart failure presenting for spell characterization and medication optimization.  Outpatient regimen is Cenobamate 200 mg daily, Topiramate ER 50 mg BID, and Keppra 1500 mg BID.During this record, her home Cenobamate, Topiramate, and Keppra have been held.     State of Consciousness:   Awake and asleep    Background:   The background is well organized, symmetric and continuous.  There is a normal anterior to posterior gradient consisting of 5-10 mcV amplitude beta activity in the frontal region and well defined alpha activity in the posterior region.   There is a well developed 30-70 uV, 9-10 Hz posterior dominant rhythm that is symmetric and reactive to eye opening and closure appreciated at maximum alertness.  Intermittent at times rhythmic slowing is noted in the right temporal region.  During drowsiness there is additionally occasional left temporal slowing seen.      Sleep:   The patient reaches stage 2 sleep with symmetric vertex waves, sleep spindles, and k complexes noted. During sleep there is occasional activity in the left temporal region that has the appearance of wickets.      Epileptiform Abnormalities  Intermittent right temporal sharps maximum F8-T4    Seizures/Events:   4 electro-clinical seizures are recorded as follows:    Seizure #1 occurs at 20:06  Clinically the  "patient reports an aura consisting of a chemical smell and feeling clammy.  She then reports feeling an aura and starts to breathe heavily, becomes notably flushed.    Electrographically there is the appearance of rhythmic right temporal delta activity with some evolution into theta activity and then the appearance of embedded sharp waves maximum at T4.    Seizure #2 occurs at 22:30  Clinically the patient starts to breathe heavy and then starts to make a moaning noise.  She is not able to describe the seizure during the event.  After the end of the seizure she is able to correctly answer orientation questions (name, date, location).  She then reports that "this one did not start with an aura it went straight into a seizure."  She reports feeling jittery at the onset and then the flushing feeling (no abnormal smell).  Electrographically, there is the same EEG pattern with rhythmic delta activity appearing in the right temporal region which evolves into theta activity and then back into delta with embedded sharps maximum at T4.    Seizure #3: 02:16  Clinically, the patient reports feeling her aura and then presses the event button.  She starts to breathe deeply.  She reports having chest pain and tightness.  She is tachycardic during the event.    Electrographically, this starts with rhythmic 4-5 hz right temporal activity which shifts between theta and delta ranges and later has embedded sharps maximum at T4.      Seizure #4: 03:48  Clinically, patient reports feeling her aura, asks spouse to press the event button.  She then breathes heavily and is flushed.  Reports feeling nauseated.  EEG shows similar seizure pattern with rhythmic delta activity in the right temporal region at onset, with evolution and the later appearance of T4 maximum sharps.     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  Four electro-clinical seizures are recorded arising from this region.  Clinically, the patient has an aura consisting of a chemical smell and feeling clammy, which then progresses into flushing and feeling jittery, heart racing and a tightness in chest.  Intermittent rhythmic right temporal slowing and sharp waves (T4-F8 maximum) are also seen.  Occasional left temporal slowing is seen during drowsiness, but no epileptiform activity is seen from this region.       EMU summary:  3/18-3/19: right temporal slowing, no seizures or clinical events  3/19-3/20: right > left temporal slowing, no seizures or clinical events  3/20-3/21: Four right temporal seizures, intermittent right temporal sharps and rhythmic slowing, occasional left temporal slowing     Shreya Moura MD  [facility]   Department of Neurology/Epilepsy

## 2024-03-21 NOTE — PROGRESS NOTES
David Nicole - EMU ([facility])  Neurology-Epilepsy  Progress Note    Patient Name: Haydee Linda  MRN: [id number]  Admission Date: 3/18/2024  Hospital Length of Stay: 3 days  Code Status: Full Code   Attending Provider: Jostin Moura*  Primary Care Physician: No, Primary Doctor   Principal Problem:Secondary seizure disorder    Subjective:     Hospital Course:   3/18>3/19: Admit to EMU. Held home Cenobamate and Topiramate XR on admit, continue Levetiracetam. No typical events. EEG with right temporal slowing, no epileptiform activity, no electrographic seizures. Proceed for event capture. Decrease Levetiracetam to 750 mg x1 then hold.  3/19>3/20: Episode of chest pain, EKG stable and troponin negative. No typical events. EEG with right > left temporal slowing, no epileptiform activity. Proceed with provoking measures for event capture- HV/PS today, sleep deprivation tonight, activation medications 3/21 AM.  3/20>3/21: Successful sleep deprivation overnight, received benadryl/tramadol this morning. Typical events captured c/w electroclinical seizures- 4 right temporal onset seizures. Gave IV Levetiracetam 2500 mg load followed by 2g BID and plan to start Clobazam 20 mg qhs. Patient had additional electroclinical seizure this afternoon; give Clobazam 10 mg x1 now followed by 10 mg qhs.     Interval History: Successful sleep deprivation overnight, received benadryl/tramadol this morning. 4 typical events overnight c/w electroclinical seizures. This AM, patient sitting upright in bed with SO at bedside. Patient awake, alert, and has no complaints. Discussed EEG findings and plan of care at length, including starting new AED- Clobazam. Answered questions at bedside.    Current Facility-Administered Medications   Medication Dose Route Frequency Provider Last Rate Last Admin    acetaminophen tablet 650 mg  650 mg Oral Q6H PRN Ju Eaton MD        cloBAZam Tab 20 mg  20 mg Oral QHS Kirsten Dallas  AGNIESZKA        docusate sodium capsule 50 mg  50 mg Oral Daily PRN Kirsten Dallas PA-C        levETIRAcetam tablet 2,000 mg  2,000 mg Oral BID Kirsten Dallas PA-C        LORazepam injection 2 mg  2 mg Intravenous Q5 Min PRN Ju Eaton MD        losartan tablet 50 mg  50 mg Oral Daily Ju Eaton MD   50 mg at 03/21/24 0837    magnesium gluconate tablet 27 mg  27 mg Oral BID Ju Eaton MD   27 mg at 03/21/24 0837    NIFEdipine 24 hr tablet 30 mg  30 mg Oral BID Ju Eaton MD   30 mg at 03/21/24 0837    ondansetron disintegrating tablet 4 mg  4 mg Oral Q6H PRN Kirsten Dallas PA-C        sodium chloride 0.9% flush 10 mL  10 mL Intravenous PRN Ju Eaton MD         Continuous Infusions:    Review of Systems  Objective:     Vital Signs (Most Recent):  Temp: 98.8 °F (37.1 °C) (03/21/24 1104)  Pulse: 79 (03/21/24 1104)  Resp: 18 (03/21/24 1104)  BP: 121/75 (03/21/24 1104)  SpO2: 96 % (03/21/24 1104) Vital Signs (24h Range):  Temp:  [97.9 °F (36.6 °C)-98.8 °F (37.1 °C)] 98.8 °F (37.1 °C)  Pulse:  [] 79  Resp:  [11-18] 18  SpO2:  [93 %-98 %] 96 %  BP: (101-164)/() 121/75     Weight: 80.3 kg (177 lb 0.5 oz)  Body mass index is 26.92 kg/m².     Physical Exam  Vitals reviewed.   Constitutional:       General: She is not in acute distress.     Appearance: She is not diaphoretic.   HENT:      Head: Normocephalic and atraumatic.      Comments: EEG in place  Eyes:      General: No scleral icterus.        Right eye: No discharge.         Left eye: No discharge.      Extraocular Movements: Extraocular movements intact and EOM normal.      Conjunctiva/sclera: Conjunctivae normal.      Pupils: Pupils are equal, round, and reactive to light.   Cardiovascular:      Rate and Rhythm: Normal rate.   Pulmonary:      Effort: Pulmonary effort is normal. No respiratory distress.   Skin:     General: Skin is warm and dry.   Neurological:      Mental Status: She is  alert and oriented to person, place, and time. Mental status is at baseline.   Psychiatric:         Mood and Affect: Mood normal.         Speech: Speech normal.         Behavior: Behavior normal.            NEUROLOGICAL EXAMINATION:     MENTAL STATUS   Oriented to person, place, and time.   Attention: normal. Concentration: normal.   Speech: speech is normal   Level of consciousness: alert    CRANIAL NERVES     CN II   Visual fields full to confrontation.     CN III, IV, VI   Pupils are equal, round, and reactive to light.  Extraocular motions are normal.     CN V   Right corneal reflex: normal  Left corneal reflex: normal    CN VII   Facial expression full, symmetric.     CN VIII   Hearing: intact    CN XI   CN XI normal.     CN XII   CN XII normal.       Significant Labs: All pertinent lab results from the past 24 hours have been reviewed.    Significant Studies: I have reviewed all pertinent imaging results/findings within the past 24 hours.  Assessment and Plan:     * Secondary seizure disorder  68F PMHx of HFrEF and right temporal hemorrhage in  complicated by seizure disorder managed by Dr. Gamez currently maintained on Cenobamate 200 mg qhs, Topiramate XR 50 mg BID, and Levetiracetam 1500 mg BID referred to EMU for event capture and characterization of seizures that have persisted despite AED escalation in addition to medication optimization due to side effects from Topiramate and Cenobamate. Previous EEG noted right temporal slowing. MRI Brain Epilepsy protocol with focal right temporal gyrus encephalomalacia and right hippocampal volume loss. Aura described as chest sensation that progresses to throat associated with abnormal smell and taste. Seizures described as flushing, diaphoresis, popped veins in neck and face, staring with loss of awareness. Last seizure on Saturday, 3/16/24.     - Continuous vEE right temporal onset seizures captured 3/20>3/21   - IV Levetiracetam 2500 mg load followed by PO  2g BID  - Patient had additional electroclinical seizure this afternoon; give Clobazam 10 mg x1 now followed by 10 mg qhs  - LEV 32.8, TPM 2.3; CNB pending  - Activation procedures per protocol- none  - IV Ativan PRN for GTC greater than 5 min - please call epilepsy on call   - Seizure precautions, suction and oxygen at bedside  - Fall precautions, side rail padding in place  - Visi monitoring with continuous pulse oximetry   - Telemetry  - Discontinue Cenobamate and Topiramate XR on discharge due to side effects    Other chest pain  Per chart review, [facility] 8/2022 with widely patent coronary arteries with preserved systolic function; the left main is angiographically normal, left anterior descending is angiographically normal, circumflex is angiographically normal, right coronary artery is a dominant vessel of the heart and angiographically normal  - Episodes of chest pain with no associated symptoms that resolved spontaneously  - EKG stable, troponin negative  - Telemetry    History of spontaneous intraparenchymal intracranial hemorrhage  Remote history of IPH ~22 years ago  - MRI Brain Epilepsy protocol with focal right temporal gyrus encephalomalacia and right hippocampal volume loss  - MRI reviewed and discussed with patient and SO    Congestive heart failure  Patient reports most recent echo showed systolic and diastolic dysfunction, EF 40-45%  Per chart review from 8/2022, single PATRICK ventriculogram was performed revealing EF 55-60%   - No s/s of exacerbation, stable on RA  - Continue home Nifedipine 30 mg BID and Losartan 50 mg daily  - Telemetry        VTE Risk Mitigation (From admission, onward)           Ordered     IP VTE HIGH RISK PATIENT  Once         03/18/24 1314     Place sequential compression device  Until discontinued         03/18/24 1314                    Kirsten Dallas PA-C  Neurology-Epilepsy  [facility] - EMU  Staff: Dr. Moura

## 2024-03-21 NOTE — ASSESSMENT & PLAN NOTE
68F PMHx of HFrEF and right temporal hemorrhage in  complicated by seizure disorder managed by Dr. Gamez currently maintained on Cenobamate 200 mg qhs, Topiramate XR 50 mg BID, and Levetiracetam 1500 mg BID referred to EMU for event capture and characterization of seizures that have persisted despite AED escalation in addition to medication optimization due to side effects from Topiramate and Cenobamate. Previous EEG noted right temporal slowing. MRI Brain Epilepsy protocol with focal right temporal gyrus encephalomalacia and right hippocampal volume loss. Aura described as chest sensation that progresses to throat associated with abnormal smell and taste. Seizures described as flushing, diaphoresis, popped veins in neck and face, staring with loss of awareness. Last seizure on Saturday, 3/16/24.     - Continuous vEE right temporal onset seizures captured 3/20>3/21   - IV Levetiracetam 2500 mg load followed by PO 2g BID; start Clobazam 20 mg qhs  - LEV 32.8, TPM 2.3; CNB pending  - Activation procedures per protocol- none  - IV Ativan PRN for GTC greater than 5 min - please call epilepsy on call   - Seizure precautions, suction and oxygen at bedside  - Fall precautions, side rail padding in place  - Visi monitoring with continuous pulse oximetry   - Telemetry  - Discontinue Cenobamate and Topiramate XR on discharge due to side effects

## 2024-03-21 NOTE — PLAN OF CARE
Problem: Adult Inpatient Plan of Care  Goal: Plan of Care Review  Outcome: Ongoing, Progressing  Flowsheets (Taken 3/21/2024 0441)  Plan of Care Reviewed With: spouse  Goal: Absence of Hospital-Acquired Illness or Injury  Outcome: Ongoing, Progressing  Intervention: Identify and Manage Fall Risk  Flowsheets (Taken 3/21/2024 0441)  Safety Promotion/Fall Prevention:   Fall Risk signage in place   Fall Risk reviewed with patient/family   bed alarm refused   family to remain at bedside   side rails raised x 3   toileting scheduled   instructed to call staff for mobility   nonskid shoes/socks when out of bed  Goal: Optimal Comfort and Wellbeing  Outcome: Ongoing, Progressing  Intervention: Monitor Pain and Promote Comfort  Flowsheets (Taken 3/21/2024 0441)  Pain Management Interventions:   pain management plan reviewed with patient/caregiver   pillow support provided   quiet environment facilitated     Bed in lowest position, bed alarm on, call light within reach and pt instructed to call if they need to get up or need any assistance. Pt with 4 seizure like events overnight. No neuro changes. Sleep deprivation completed at 0300. Education and POC discussed with pt and  at bedside. VISI monitoring in place, see flowsheets for VS. POC ongoing.

## 2024-03-21 NOTE — NURSING
0217- Another sz like event occurred. Pts face became flush, experienced slight SOB, and diaphoretic. VS were checked and SBP was slightly elevated. No changes in neuro status. Event lasted about 1.5 minutes long.

## 2024-03-21 NOTE — NURSING
"Pt pressed EMU button. Upon arrival to pt's room, pt stated "I had an aura, but I am ok now. I am having a seizure but am coming out of it now." VSS. No LOC. Pt A&Ox4, and able to follow commands. Respirations even and unlabored. Episode lasted approx 35 seconds. POC ongoing.   "

## 2024-03-21 NOTE — ASSESSMENT & PLAN NOTE
Per chart review, [facility] 8/2022 with widely patent coronary arteries with preserved systolic function; the left main is angiographically normal, left anterior descending is angiographically normal, circumflex is angiographically normal, right coronary artery is a dominant vessel of the heart and angiographically normal  - Episodes of chest pain with no associated symptoms that resolved spontaneously  - EKG stable, troponin negative  - Telemetry

## 2024-03-22 LAB
CENOBAMATE (XCOPRI), PLASMA: 16.3 UG/ML
OHS QRS DURATION: 90 MS
OHS QTC CALCULATION: 442 MS

## 2024-03-22 PROCEDURE — 95714 VEEG EA 12-26 HR UNMNTR: CPT

## 2024-03-22 PROCEDURE — 94761 N-INVAS EAR/PLS OXIMETRY MLT: CPT

## 2024-03-22 PROCEDURE — 11000001 HC ACUTE MED/SURG PRIVATE ROOM

## 2024-03-22 PROCEDURE — 25000003 PHARM REV CODE 250: Performed by: STUDENT IN AN ORGANIZED HEALTH CARE EDUCATION/TRAINING PROGRAM

## 2024-03-22 PROCEDURE — 93005 ELECTROCARDIOGRAM TRACING: CPT

## 2024-03-22 PROCEDURE — 95720 EEG PHY/QHP EA INCR W/VEEG: CPT | Mod: ,,, | Performed by: PSYCHIATRY & NEUROLOGY

## 2024-03-22 PROCEDURE — 99233 SBSQ HOSP IP/OBS HIGH 50: CPT | Mod: ,,, | Performed by: PHYSICIAN ASSISTANT

## 2024-03-22 PROCEDURE — 25000003 PHARM REV CODE 250: Performed by: PHYSICIAN ASSISTANT

## 2024-03-22 PROCEDURE — 93010 ELECTROCARDIOGRAM REPORT: CPT | Mod: ,,, | Performed by: INTERNAL MEDICINE

## 2024-03-22 RX ORDER — CLOBAZAM 10 MG/1
20 TABLET ORAL NIGHTLY
Status: DISCONTINUED | OUTPATIENT
Start: 2024-03-22 | End: 2024-03-23 | Stop reason: HOSPADM

## 2024-03-22 RX ADMIN — NIFEDIPINE 30 MG: 30 TABLET, FILM COATED, EXTENDED RELEASE ORAL at 09:03

## 2024-03-22 RX ADMIN — LOSARTAN POTASSIUM 50 MG: 50 TABLET, FILM COATED ORAL at 09:03

## 2024-03-22 RX ADMIN — LEVETIRACETAM 2000 MG: 500 TABLET, FILM COATED ORAL at 09:03

## 2024-03-22 RX ADMIN — Medication 27 MG: at 09:03

## 2024-03-22 RX ADMIN — CLOBAZAM 20 MG: 10 TABLET ORAL at 09:03

## 2024-03-22 NOTE — PLAN OF CARE
Problem: Adult Inpatient Plan of Care  Goal: Plan of Care Review  Outcome: Ongoing, Progressing  Goal: Patient-Specific Goal (Individualized)  Outcome: Ongoing, Progressing  Goal: Absence of Hospital-Acquired Illness or Injury  Outcome: Ongoing, Progressing  Goal: Optimal Comfort and Wellbeing  Outcome: Ongoing, Progressing  Goal: Readiness for Transition of Care  Outcome: Ongoing, Progressing     Problem: Seizure, Active Management  Goal: Absence of Seizure/Seizure-Related Injury  Outcome: Ongoing, Progressing     Problem: Fall Injury Risk  Goal: Absence of Fall and Fall-Related Injury  Outcome: Ongoing, Progressing

## 2024-03-22 NOTE — NURSING
Patient had 1 event @2122. Chest Fullness, tightening in throat, fuzzy vision, was fanning self. Had chemical smell and taste. Lasted for approx. 2 min.  @ BS. Patient immediately back to baseline

## 2024-03-22 NOTE — PROGRESS NOTES
David Nicole - EMU ([facility])  Neurology-Epilepsy  Progress Note    Patient Name: Haydee Linda  MRN: [id number]  Admission Date: 3/18/2024  Hospital Length of Stay: 4 days  Code Status: Full Code   Attending Provider: Manjit Mead MD  Primary Care Physician: No, Primary Doctor   Principal Problem:Secondary seizure disorder    Subjective:     Hospital Course:   3/18>3/19: Admit to EMU. Held home Cenobamate and Topiramate XR on admit, continue Levetiracetam. No typical events. EEG with right temporal slowing, no epileptiform activity, no electrographic seizures. Proceed for event capture. Decrease Levetiracetam to 750 mg x1 then hold.  3/19>3/20: Episode of chest pain, EKG stable and troponin negative. No typical events. EEG with right > left temporal slowing, no epileptiform activity. Proceed with provoking measures for event capture- HV/PS today, sleep deprivation tonight, activation medications 3/21 AM.  3/20>3/21: Successful sleep deprivation overnight, received benadryl/tramadol this morning. Typical events captured c/w electroclinical seizures- 4 right temporal onset seizures. Gave IV Levetiracetam 2500 mg load followed by 2g BID and start Clobazam 20 mg qhs.   3/21>3/22: 2 electroclinical seizures with right temporal onset. Continue Levetiracetam 2g BID and Clobazam 20 mg qhs. Tentative plan for discharge 3/23 if no further seizures.  See EEG reports for details.    Interval History: This AM, patient sitting upright in bed with SO at bedside. Patient reports typical seizure last night ~2122 that was very brief. She reports sleeping well overnight. Patient reports episode of chest pain earlier this morning that resolved without intervention, no associated symptoms. Repeat EKG stable. Discussed plan of care and answered questions at bedside.    Current Facility-Administered Medications   Medication Dose Route Frequency Provider Last Rate Last Admin    acetaminophen tablet 650 mg  650 mg Oral Q6H PRN Walter  Ju Atkins MD        cloBAZam Tab 10 mg  10 mg Oral QHS Kirsten Dallas PA-C   10 mg at 03/21/24 2204    docusate sodium capsule 50 mg  50 mg Oral Daily PRN Kirsten Dallas PA-C        levETIRAcetam tablet 2,000 mg  2,000 mg Oral BID Kirsten Dallas PA-C   2,000 mg at 03/22/24 0922    LORazepam injection 2 mg  2 mg Intravenous Q5 Min PRN Ju Eaton MD        losartan tablet 50 mg  50 mg Oral Daily Ju Eaton MD   50 mg at 03/22/24 0922    magnesium gluconate tablet 27 mg  27 mg Oral BID Ju Eaton MD   27 mg at 03/22/24 0922    NIFEdipine 24 hr tablet 30 mg  30 mg Oral BID Ju Eaton MD   30 mg at 03/22/24 0922    ondansetron disintegrating tablet 4 mg  4 mg Oral Q6H PRN Kirsten Dallas PA-C        sodium chloride 0.9% flush 10 mL  10 mL Intravenous PRN Ju Eaton MD         Continuous Infusions:    Review of Systems  Objective:     Vital Signs (Most Recent):  Temp: 97.2 °F (36.2 °C) (03/22/24 0818)  Pulse: 74 (03/22/24 0818)  Resp: 13 (03/22/24 0818)  BP: 96/66 (03/22/24 0818)  SpO2: 98 % (03/22/24 0818) Vital Signs (24h Range):  Temp:  [97.2 °F (36.2 °C)-98.8 °F (37.1 °C)] 97.2 °F (36.2 °C)  Pulse:  [70-96] 74  Resp:  [13-18] 13  SpO2:  [95 %-98 %] 98 %  BP: ()/(66-84) 96/66     Weight: 80.3 kg (177 lb 0.5 oz)  Body mass index is 26.92 kg/m².     Physical Exam  Vitals reviewed.   Constitutional:       General: She is not in acute distress.     Appearance: She is not diaphoretic.   HENT:      Head: Normocephalic and atraumatic.      Comments: EEG in place  Eyes:      General: No scleral icterus.        Right eye: No discharge.         Left eye: No discharge.      Extraocular Movements: Extraocular movements intact and EOM normal.      Conjunctiva/sclera: Conjunctivae normal.      Pupils: Pupils are equal, round, and reactive to light.   Cardiovascular:      Rate and Rhythm: Normal rate.   Pulmonary:      Effort: Pulmonary effort  is normal. No respiratory distress.   Skin:     General: Skin is warm and dry.   Neurological:      Mental Status: She is alert and oriented to person, place, and time. Mental status is at baseline.   Psychiatric:         Mood and Affect: Mood normal.         Speech: Speech normal.         Behavior: Behavior normal.            NEUROLOGICAL EXAMINATION:     MENTAL STATUS   Oriented to person, place, and time.   Attention: normal. Concentration: normal.   Speech: speech is normal   Level of consciousness: alert    CRANIAL NERVES     CN II   Visual fields full to confrontation.     CN III, IV, VI   Pupils are equal, round, and reactive to light.  Extraocular motions are normal.     CN V   Right corneal reflex: normal  Left corneal reflex: normal    CN VII   Facial expression full, symmetric.     CN VIII   Hearing: intact    CN XI   CN XI normal.     CN XII   CN XII normal.       Significant Labs: All pertinent lab results from the past 24 hours have been reviewed.    Significant Studies: I have reviewed all pertinent imaging results/findings within the past 24 hours.  Assessment and Plan:     * Secondary seizure disorder  68F PMHx of HFrEF and right temporal hemorrhage in 2002 complicated by seizure disorder managed by Dr. Gamez currently maintained on Cenobamate 200 mg qhs, Topiramate XR 50 mg BID, and Levetiracetam 1500 mg BID referred to EMU for event capture and characterization of seizures that have persisted despite AED escalation in addition to medication optimization due to side effects from Topiramate and Cenobamate. Previous EEG noted right temporal slowing. MRI Brain Epilepsy protocol with focal right temporal gyrus encephalomalacia and right hippocampal volume loss. Aura described as chest sensation that progresses to throat associated with abnormal smell and taste. Seizures described as flushing, diaphoresis, popped veins in neck and face, staring with loss of awareness. Last seizure on Saturday, 3/16/24.      - Continuous vEE right temporal onset seizures 3/20>3/21, 2 right temporal onset seizures 3/21>3/22   - Continue Levetiracetam 2g BID and Clobazam 20 mg qhs  - LEV 32.8, TPM 2.3, CNB 16.3  - Activation procedures per protocol- none  - IV Ativan PRN for GTC greater than 5 min - please call epilepsy on call   - Seizure precautions, suction and oxygen at bedside  - Fall precautions, side rail padding in place  - Visi monitoring with continuous pulse oximetry   - Telemetry  - Discontinue Cenobamate and Topiramate XR on discharge due to side effects    Other chest pain  Per chart review, [facility] 2022 with widely patent coronary arteries with preserved systolic function; the left main is angiographically normal, left anterior descending is angiographically normal, circumflex is angiographically normal, right coronary artery is a dominant vessel of the heart and angiographically normal  - Episodes of chest pain with no associated symptoms that resolved spontaneously  - EKGs stable, troponin negative  - Telemetry    History of spontaneous intraparenchymal intracranial hemorrhage  Remote history of IPH ~22 years ago  - MRI Brain Epilepsy protocol with focal right temporal gyrus encephalomalacia and right hippocampal volume loss  - MRI reviewed and discussed with patient and SO    Congestive heart failure  Patient reports most recent echo showed systolic and diastolic dysfunction, EF 40-45%  Per chart review from 2022, single PATRICK ventriculogram was performed revealing EF 55-60%   - No s/s of exacerbation, stable on RA  - Continue home Nifedipine 30 mg BID and Losartan 50 mg daily  - Telemetry        VTE Risk Mitigation (From admission, onward)           Ordered     IP VTE HIGH RISK PATIENT  Once         24 1314     Place sequential compression device  Until discontinued         24 1314                    Kirsten Dallas PA-C  Neurology-Epilepsy  [facility] - [facility]  Staff: Dr. Mead

## 2024-03-22 NOTE — ASSESSMENT & PLAN NOTE
Per chart review, [facility] 8/2022 with widely patent coronary arteries with preserved systolic function; the left main is angiographically normal, left anterior descending is angiographically normal, circumflex is angiographically normal, right coronary artery is a dominant vessel of the heart and angiographically normal  - Episodes of chest pain with no associated symptoms that resolved spontaneously  - EKGs stable, troponin negative  - Telemetry

## 2024-03-22 NOTE — PLAN OF CARE
Problem: Adult Inpatient Plan of Care  Goal: Plan of Care Review  Outcome: Ongoing, Progressing  Goal: Patient-Specific Goal (Individualized)  Outcome: Ongoing, Progressing  Goal: Absence of Hospital-Acquired Illness or Injury  Outcome: Ongoing, Progressing  Goal: Optimal Comfort and Wellbeing  Outcome: Ongoing, Progressing  Goal: Readiness for Transition of Care  Outcome: Ongoing, Progressing     Problem: Seizure, Active Management  Goal: Absence of Seizure/Seizure-Related Injury  Outcome: Ongoing, Progressing     Problem: Fall Injury Risk  Goal: Absence of Fall and Fall-Related Injury  Outcome: Ongoing, Progressing    POC reviewed and updated with the patient/pt's spouse. Questions regarding POC were encouraged and addressed with the patient.  VSS, see flow-sheets. Tele maintained per order.  VISI applied/ maintained for additional monitoring. Patient is AO X 4 at this time. Continuous EEG in place. Fall/safety precautions maintained, no signs of injury noted during shift. Seizure precautions maintained. Patient repositioned independently/ with assistance for comfort, bed locked in low position with side rails X 4, bed alarm refused, with call light within reach. Instructed patient to call staff for mobility, verbalized understanding.  No acute signs of distress noted at this time. POC ongoing.

## 2024-03-22 NOTE — PROGRESS NOTES
EEG Hook up  AM Check Electrodes had to be fixed.Yes    Skin Integrity: Normal     Eduardo Robertson   03/22/2024 8:54 AM

## 2024-03-22 NOTE — ASSESSMENT & PLAN NOTE
68F PMHx of HFrEF and right temporal hemorrhage in  complicated by seizure disorder managed by Dr. Gamez currently maintained on Cenobamate 200 mg qhs, Topiramate XR 50 mg BID, and Levetiracetam 1500 mg BID referred to EMU for event capture and characterization of seizures that have persisted despite AED escalation in addition to medication optimization due to side effects from Topiramate and Cenobamate. Previous EEG noted right temporal slowing. MRI Brain Epilepsy protocol with focal right temporal gyrus encephalomalacia and right hippocampal volume loss. Aura described as chest sensation that progresses to throat associated with abnormal smell and taste. Seizures described as flushing, diaphoresis, popped veins in neck and face, staring with loss of awareness. Last seizure on Saturday, 3/16/24.     - Continuous vEE right temporal onset seizures 3/20>3/21, 2 right temporal onset seizures 3/21>3/22   - Continue Levetiracetam 2g BID and Clobazam 20 mg qhs  - LEV 32.8, TPM 2.3, CNB 16.3  - Activation procedures per protocol- none  - IV Ativan PRN for GTC greater than 5 min - please call epilepsy on call   - Seizure precautions, suction and oxygen at bedside  - Fall precautions, side rail padding in place  - Visi monitoring with continuous pulse oximetry   - Telemetry  - Discontinue Cenobamate and Topiramate XR on discharge due to side effects

## 2024-03-22 NOTE — SUBJECTIVE & OBJECTIVE
Interval History: This AM, patient sitting upright in bed with SO at bedside. Patient reports typical seizure last night ~2122 that was very brief. She reports sleeping well overnight. Patient reports episode of chest pain earlier this morning that resolved without intervention, no associated symptoms. Repeat EKG stable. Discussed plan of care and answered questions at bedside.    Current Facility-Administered Medications   Medication Dose Route Frequency Provider Last Rate Last Admin    acetaminophen tablet 650 mg  650 mg Oral Q6H PRN Ju Eaton MD        cloBAZam Tab 10 mg  10 mg Oral QHS Kirsten Dallas PA-C   10 mg at 03/21/24 2204    docusate sodium capsule 50 mg  50 mg Oral Daily PRN Kirsten Dallas PA-C        levETIRAcetam tablet 2,000 mg  2,000 mg Oral BID Kirsten Dallas PA-C   2,000 mg at 03/22/24 0922    LORazepam injection 2 mg  2 mg Intravenous Q5 Min PRN Ju Eaton MD        losartan tablet 50 mg  50 mg Oral Daily Ju Eaton MD   50 mg at 03/22/24 0922    magnesium gluconate tablet 27 mg  27 mg Oral BID Ju Eaton MD   27 mg at 03/22/24 0922    NIFEdipine 24 hr tablet 30 mg  30 mg Oral BID Ju Eaton MD   30 mg at 03/22/24 0922    ondansetron disintegrating tablet 4 mg  4 mg Oral Q6H PRN Kirsten Dallas PA-C        sodium chloride 0.9% flush 10 mL  10 mL Intravenous PRN Ju Eaton MD         Continuous Infusions:    Review of Systems  Objective:     Vital Signs (Most Recent):  Temp: 97.2 °F (36.2 °C) (03/22/24 0818)  Pulse: 74 (03/22/24 0818)  Resp: 13 (03/22/24 0818)  BP: 96/66 (03/22/24 0818)  SpO2: 98 % (03/22/24 0818) Vital Signs (24h Range):  Temp:  [97.2 °F (36.2 °C)-98.8 °F (37.1 °C)] 97.2 °F (36.2 °C)  Pulse:  [70-96] 74  Resp:  [13-18] 13  SpO2:  [95 %-98 %] 98 %  BP: ()/(66-84) 96/66     Weight: 80.3 kg (177 lb 0.5 oz)  Body mass index is 26.92 kg/m².     Physical Exam  Vitals reviewed.    Constitutional:       General: She is not in acute distress.     Appearance: She is not diaphoretic.   HENT:      Head: Normocephalic and atraumatic.      Comments: EEG in place  Eyes:      General: No scleral icterus.        Right eye: No discharge.         Left eye: No discharge.      Extraocular Movements: Extraocular movements intact and EOM normal.      Conjunctiva/sclera: Conjunctivae normal.      Pupils: Pupils are equal, round, and reactive to light.   Cardiovascular:      Rate and Rhythm: Normal rate.   Pulmonary:      Effort: Pulmonary effort is normal. No respiratory distress.   Skin:     General: Skin is warm and dry.   Neurological:      Mental Status: She is alert and oriented to person, place, and time. Mental status is at baseline.   Psychiatric:         Mood and Affect: Mood normal.         Speech: Speech normal.         Behavior: Behavior normal.            NEUROLOGICAL EXAMINATION:     MENTAL STATUS   Oriented to person, place, and time.   Attention: normal. Concentration: normal.   Speech: speech is normal   Level of consciousness: alert    CRANIAL NERVES     CN II   Visual fields full to confrontation.     CN III, IV, VI   Pupils are equal, round, and reactive to light.  Extraocular motions are normal.     CN V   Right corneal reflex: normal  Left corneal reflex: normal    CN VII   Facial expression full, symmetric.     CN VIII   Hearing: intact    CN XI   CN XI normal.     CN XII   CN XII normal.       Significant Labs: All pertinent lab results from the past 24 hours have been reviewed.    Significant Studies: I have reviewed all pertinent imaging results/findings within the past 24 hours.

## 2024-03-23 ENCOUNTER — DOCUMENTATION ONLY (OUTPATIENT)
Dept: NEUROLOGY | Facility: CLINIC | Age: 69
End: 2024-03-23
Payer: MEDICARE

## 2024-03-23 VITALS
OXYGEN SATURATION: 97 % | TEMPERATURE: 98 F | HEIGHT: 68 IN | WEIGHT: 177 LBS | DIASTOLIC BLOOD PRESSURE: 58 MMHG | HEART RATE: 81 BPM | SYSTOLIC BLOOD PRESSURE: 101 MMHG | BODY MASS INDEX: 26.83 KG/M2 | RESPIRATION RATE: 18 BRPM

## 2024-03-23 PROCEDURE — 25000003 PHARM REV CODE 250: Performed by: PHYSICIAN ASSISTANT

## 2024-03-23 PROCEDURE — 25000003 PHARM REV CODE 250: Performed by: STUDENT IN AN ORGANIZED HEALTH CARE EDUCATION/TRAINING PROGRAM

## 2024-03-23 PROCEDURE — 99239 HOSP IP/OBS DSCHRG MGMT >30: CPT | Mod: ,,, | Performed by: PSYCHIATRY & NEUROLOGY

## 2024-03-23 PROCEDURE — 95718 EEG PHYS/QHP 2-12 HR W/VEEG: CPT | Mod: ,,, | Performed by: PSYCHIATRY & NEUROLOGY

## 2024-03-23 RX ADMIN — Medication 27 MG: at 08:03

## 2024-03-23 RX ADMIN — LEVETIRACETAM 2000 MG: 500 TABLET, FILM COATED ORAL at 08:03

## 2024-03-23 RX ADMIN — LOSARTAN POTASSIUM 50 MG: 50 TABLET, FILM COATED ORAL at 08:03

## 2024-03-23 RX ADMIN — NIFEDIPINE 30 MG: 30 TABLET, FILM COATED, EXTENDED RELEASE ORAL at 08:03

## 2024-03-23 NOTE — PROCEDURES
EEG REPORT      Haydee Linda  [id number]  1955    DATE OF SERVICE: 3/23/2024    [facility] -6    METHODOLOGY      Extended electroencephalographic recording is made while the patient is ambulatory and continuing normal daily activities.  Electrodes are placed according to the International 10-20 placement system and included T1 and T2 electrode placement.  Twenty four (24) channels of digital signal (sampling rate of 512/sec) was simultaneously recorded from the scalp including EKG and eye monitors.  Recording band pass was 0.1 to 100 hz and all data was stored digitally on the recorder.  The patient is instructed to press an event button when clinical symptoms occur and write the symptoms into a diary. Activation procedures which include photic stimulation, hyperventilation and instructing patients to perform simple task are done in selected patients.        The EEG is displayed on a monitor screen and can be reformatted into different montages for evaluation.  The entire recoding is submitted for computer assisted analysis to detect spike and electrographic seizure activity.  The entire recording is visually reviewed and the times identified by computer analysis as being spikes or seizures are reviewed again.  Compresses spectral analysis (CSA) is also performed on the activity recorded from each individual channel.  This is displayed as a power display of frequencies from 0 to 30 Hz over time.   The CSA analysis is done and displayed continuously.  This is reviewed for asymmetries in power between homologous areas of the scalp and for presence of changes in power which canbe seen when seizures occur.  Sections of suspected abnormalities on the CSA is then compared with the original EEG recording.  .     anfix software was also utilized in the review of this study.  This software suite analyzes the EEG recording in multiple domains.  Coherence and rhythmicity is computed to identify EEG sections which  may contain organized seizures.  Each channel undergoes analysis to detect presence of spike and sharp waves which have special and morphological characteristic of epileptic activity.  The routine EEG recording is converted from spacial into frequency domain.  This is then displayed comparing homologous areas to identify areas of significant asymmetry.  Algorithm to identify non-cortically generated artifact is used to separate eye movement, EMG and other artifact from the EEG     Recording Times    A total of 4:26:51 hours of EEG was recorded.      EEG FINDINGS:  Background activity:   The background rhythm was characterized by alpha and anterior dominant beta activity with a 10Hz posterior dominant alpha rhythm at 30-70 microvolts.   Symmetry and continuity: there is intermittent right more than left temporal slowing.     Sleep:   Normal sleep transients including sleep spindles, K complexes, vertex waves and POSTS were seen.    Activation procedures:   NA    Abnormal activity:   Occasional sharp waves as well as rhythmic delta at F8    IMPRESSION:   Abnormal EEG due to a regional cortical or subcortical dysfunction in the right more than left temporal lobe with seizure focus in the right anterior temporal region.  No electrographic seizures.      Miguel aPce MD  Neurology-Epilepsy.  [facility].
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
## 2024-03-23 NOTE — NURSING
AVS reviewed with patient and spouse. All questions answered. Meds retrieved from pharmacy. Patient left with all belongings in wheelchair to personal vehicle.

## 2024-03-23 NOTE — PROCEDURES
EEG REPORT      Haydee Linda  [id number]  1955    DATE OF SERVICE: 3/22/2024    [facility]     METHODOLOGY      Extended electroencephalographic recording is made while the patient is ambulatory and continuing normal daily activities.  Electrodes are placed according to the International 10-20 placement system and included T1 and T2 electrode placement.  Twenty four (24) channels of digital signal (sampling rate of 512/sec) was simultaneously recorded from the scalp including EKG and eye monitors.  Recording band pass was 0.1 to 100 hz and all data was stored digitally on the recorder.  The patient is instructed to press an event button when clinical symptoms occur and write the symptoms into a diary. Activation procedures which include photic stimulation, hyperventilation and instructing patients to perform simple task are done in selected patients.        The EEG is displayed on a monitor screen and can be reformatted into different montages for evaluation.  The entire recoding is submitted for computer assisted analysis to detect spike and electrographic seizure activity.  The entire recording is visually reviewed and the times identified by computer analysis as being spikes or seizures are reviewed again.  Compresses spectral analysis (CSA) is also performed on the activity recorded from each individual channel.  This is displayed as a power display of frequencies from 0 to 30 Hz over time.   The CSA analysis is done and displayed continuously.  This is reviewed for asymmetries in power between homologous areas of the scalp and for presence of changes in power which canbe seen when seizures occur.  Sections of suspected abnormalities on the CSA is then compared with the original EEG recording.  .     [employer] Par[employer] software was also utilized in the review of this study.  This software suite analyzes the EEG recording in multiple domains.  Coherence and rhythmicity is computed to identify EEG sections which may  contain organized seizures.  Each channel undergoes analysis to detect presence of spike and sharp waves which have special and morphological characteristic of epileptic activity.  The routine EEG recording is converted from spacial into frequency domain.  This is then displayed comparing homologous areas to identify areas of significant asymmetry.  Algorithm to identify non-cortically generated artifact is used to separate eye movement, EMG and other artifact from the EEG     Recording Times  Start on 3/22/2024  Stop on 3/23/2024    A total of 23:51:47 hours of EEG was recorded.      EEG FINDINGS:  Background activity:   The background rhythm was characterized by alpha and anterior dominant beta activity with a 10Hz posterior dominant alpha rhythm at 30-70 microvolts.   Symmetry and continuity: there is intermittent right more than left temporal slowing.     Sleep:   Normal sleep transients including sleep spindles, K complexes, vertex waves and POSTS were seen.    Activation procedures:   NA    Abnormal activity:   Occasional sharp waves as well as rhythmic delta at F8    IMPRESSION:   Abnormal EEG due to a regional cortical or subcortical dysfunction in the right more than left temporal lobe with seizure focus in the right anterior temporal region.  No electrographic seizures.      Miguel Pace MD  Neurology-Epilepsy.  [facility].

## 2024-03-23 NOTE — PROGRESS NOTES
EEG Disconnect  Mild skin redness/breakdown on the right side Fp2, F8, F4, MICHEAL,   Moderate skin breakdown on the left side Fp1, MICHEAL, F3,LOC and bleeding at F7    Skin Integrity: Moderate     Eduardo Robertson   03/23/2024 11:55 AM

## 2024-03-23 NOTE — NURSING
Nurses Note -- 4 Eyes      3/23/2024   5:18 AM      Skin assessed during: {4eyes ATSD- System List:57857}      [] No Altered Skin Integrity Present    []Prevention Measures Documented      [] Yes- Altered Skin Integrity Present or Discovered   [] LDA Added if Not in Epic (Describe Wound)   [] New Altered Skin Integrity was Present on Admit and Documented in LDA   [] Wound Image Taken    Wound Care Consulted? {YES/NO:10681}    Attending Nurse:  Pan Ortiz RN/Staff Member:

## 2024-03-26 ENCOUNTER — TELEPHONE (OUTPATIENT)
Dept: NEUROLOGY | Facility: CLINIC | Age: 69
End: 2024-03-26
Payer: MEDICARE

## 2024-03-26 NOTE — TELEPHONE ENCOUNTER
----- Message from Gladis Shafferens sent at 3/26/2024  2:24 PM CDT -----  Regarding: question  Contact: @[phone]  Pt called in regards to speaking with someone to talk about her discharged paperwork and she also wanted to talk about her plan of care as well  .....Please call and adv @[phone]

## 2024-04-03 NOTE — PROCEDURES
VIDEO ELECTROENCEPHALOGRAM  REPORT    DATE OF SERVICE:3/21/24-3/22/24  EEG NUMBER: [id number]  REQUESTED BY:  Dr Gladis Gamez  LOCATION OF SERVICE:  [facility]    METHODOLOGY   Electroencephalographic (EEG) recording is with electrodes placed according to the International 10-20 placement system.  Thirty two (32) channels of digital signal (sampling rate of 512/sec) including T1 and T2 was simultaneously recorded from the scalp and may include  EKG, EMG, and/or eye monitors.  Recording band pass was 0.1 to 512 hz.  Digital video recording of the patient is simultaneously recorded with the EEG.  The patient is instructed report clinical symptoms which may occur during the recording session.  EEG and video recording is stored and archived in digital format.  Activation procedures which include photic stimulation, hyperventilation and instructing patients to perform simple task are done in selected patients.   The EEG is displayed on a monitor screen and can be reviewed using different montages.  Computer assisted analysis is employed to detect spike and electrographic seizure activity.   The entire record is submitted for computer analysis.  The entire recording is visually reviewed and the times identified by computer analysis as being spikes or seizures are reviewed again.  Compresses spectral analysis (CSA) is also performed on the activity recorded from each individual channel.  This is displayed as a power display of frequencies from 0 to 30 Hz over time.   The CSA is reviewed looking for asymmetries in power between homologous areas of the scalp and then compared with the original EEG recording.     [employer] software was also utilized in the review of this study.  This software suite analyzes the EEG recording in multiple domains.  Coherence and rhythmicity is computed to identify EEG sections which may contain organized seizures.  Each channel undergoes analysis to detect presence of spike and sharp waves which have  special and morphological characteristic of epileptic activity.  The routine EEG recording is converted from spacial into frequency domain.  This is then displayed comparing homologous areas to identify areas of significant asymmetry.  Algorithm to identify non-cortically generated artifact is used to separate eye movement, EMG and other artifact from the EEG.      ELECTROENCEPHALOGRAM:    RECORDING TIMES:  Start on 3/21/24 at 07:00  Stop on 3/22/24 at 07:00    A total of 24 hours of EEG recording was obtained.    Indication: 68 year old female with history of prior right sided temporal ICH (2002) with subsequent seizures, migraines, heart failure presenting for spell characterization and medication optimization.  Outpatient regimen is Cenobamate 200 mg daily, Topiramate ER 50 mg BID, and Keppra 1500 mg BID.During this record, her home Cenobamate, Topiramate, and Keppra have been held.     State of Consciousness:   Awake and asleep    Background:   The background is well organized, symmetric and continuous.  There is a normal anterior to posterior gradient consisting of 5-10 mcV amplitude beta activity in the frontal region and well defined alpha activity in the posterior region.   There is a well developed 30-70 uV, 9-10 Hz posterior dominant rhythm that is symmetric and reactive to eye opening and closure appreciated at maximum alertness.  Intermittent at times rhythmic slowing is noted in the right temporal region.  During drowsiness there is additionally occasional left temporal slowing seen.      Sleep:   The patient reaches stage 2 sleep with symmetric vertex waves, sleep spindles, and k complexes noted.     Epileptiform Abnormalities  Intermittent right temporal sharps maximum F8-T4    Seizures/Events:   Seizure #5 of admission occurs at 12:40  The patient feels an aura of clamminess and chemical smell, then presses the event button.  A similar EEG pattern is noted with rhythmic right temporal theta/delta  activity that evolves and slightly spreads to the right parasagittal chain.     Seizure #6 (of admission) at 21:21  Clinically, the patient feels her aura and presses the event button.    Electrographically there is the appearance of rhythmic right temporal delta activity with some evolution into theta activity and then the appearance of embedded sharp waves maximum at T4.     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  Two electro-clinical seizures are recorded arising from this region.  Clinically, the patient has an aura consisting of a chemical smell and feeling clammy, which then progresses into flushing and feeling jittery, heart racing and a tightness in chest.  Intermittent rhythmic right temporal slowing and sharp waves (T4-F8 maximum) are also seen.      EMU summary:  3/18-3/19: right temporal slowing, no seizures or clinical events  3/19-3/20: right > left temporal slowing, no seizures or clinical events  3/20-3/21: Four right temporal seizures, intermittent right temporal sharps and rhythmic slowing, occasional left temporal slowing  3/21-3/22: two clinical seizures, right temporal slowing and spikes     Shreya Moura MD  [facility]   Department of Neurology/Epilepsy

## 2024-04-10 ENCOUNTER — OUTSIDE PLACE OF SERVICE (OUTPATIENT)
Dept: NEUROLOGY | Facility: CLINIC | Age: 69
End: 2024-04-10
Payer: MEDICARE

## 2024-04-10 DIAGNOSIS — G40.219 LOCALIZATION-RELATED (FOCAL) (PARTIAL) SYMPTOMATIC EPILEPSY AND EPILEPTIC SYNDROMES WITH COMPLEX PARTIAL SEIZURES, INTRACTABLE, WITHOUT STATUS EPILEPTICUS: Primary | ICD-10-CM

## 2024-04-10 PROCEDURE — 99232 SBSQ HOSP IP/OBS MODERATE 35: CPT | Mod: S$PBB,,, | Performed by: SPECIALIST

## 2024-04-11 ENCOUNTER — TELEPHONE (OUTPATIENT)
Dept: NEUROLOGY | Facility: CLINIC | Age: 69
End: 2024-04-11
Payer: MEDICARE

## 2024-04-11 NOTE — TELEPHONE ENCOUNTER
Called pt to schedule a follow up with Dr. Gamez. Pt did not answer, I also did not have the option to leave a voicemail.

## 2024-04-12 ENCOUNTER — TELEPHONE (OUTPATIENT)
Dept: NEUROLOGY | Facility: CLINIC | Age: 69
End: 2024-04-12
Payer: MEDICARE

## 2024-04-19 ENCOUNTER — LAB VISIT (OUTPATIENT)
Dept: LAB | Facility: HOSPITAL | Age: 69
End: 2024-04-19
Attending: PSYCHIATRY & NEUROLOGY
Payer: MEDICARE

## 2024-04-19 ENCOUNTER — TELEPHONE (OUTPATIENT)
Dept: NEUROLOGY | Facility: CLINIC | Age: 69
End: 2024-04-19
Payer: MEDICARE

## 2024-04-19 ENCOUNTER — OFFICE VISIT (OUTPATIENT)
Dept: NEUROLOGY | Facility: CLINIC | Age: 69
End: 2024-04-19
Payer: MEDICARE

## 2024-04-19 VITALS
WEIGHT: 163 LBS | DIASTOLIC BLOOD PRESSURE: 73 MMHG | HEART RATE: 87 BPM | HEIGHT: 68 IN | SYSTOLIC BLOOD PRESSURE: 129 MMHG | BODY MASS INDEX: 24.71 KG/M2

## 2024-04-19 DIAGNOSIS — G40.919 REFRACTORY EPILEPSY: Primary | ICD-10-CM

## 2024-04-19 DIAGNOSIS — I50.42 CHRONIC COMBINED SYSTOLIC AND DIASTOLIC CONGESTIVE HEART FAILURE: ICD-10-CM

## 2024-04-19 DIAGNOSIS — Z86.79 HISTORY OF SPONTANEOUS INTRAPARENCHYMAL INTRACRANIAL HEMORRHAGE: ICD-10-CM

## 2024-04-19 DIAGNOSIS — G40.219 LOCALIZATION-RELATED (FOCAL) (PARTIAL) SYMPTOMATIC EPILEPSY AND EPILEPTIC SYNDROMES WITH COMPLEX PARTIAL SEIZURES, INTRACTABLE, WITHOUT STATUS EPILEPTICUS: ICD-10-CM

## 2024-04-19 DIAGNOSIS — G40.919 REFRACTORY EPILEPSY: ICD-10-CM

## 2024-04-19 LAB
ALBUMIN SERPL BCP-MCNC: 4.4 G/DL (ref 3.5–5.2)
ALP SERPL-CCNC: 147 U/L (ref 55–135)
ALT SERPL W/O P-5'-P-CCNC: 41 U/L (ref 10–44)
ANION GAP SERPL CALC-SCNC: 11 MMOL/L (ref 8–16)
AST SERPL-CCNC: 34 U/L (ref 10–40)
BILIRUB SERPL-MCNC: 0.3 MG/DL (ref 0.1–1)
BUN SERPL-MCNC: 18 MG/DL (ref 8–23)
CALCIUM SERPL-MCNC: 10.3 MG/DL (ref 8.7–10.5)
CHLORIDE SERPL-SCNC: 98 MMOL/L (ref 95–110)
CO2 SERPL-SCNC: 24 MMOL/L (ref 23–29)
CREAT SERPL-MCNC: 0.9 MG/DL (ref 0.5–1.4)
ERYTHROCYTE [DISTWIDTH] IN BLOOD BY AUTOMATED COUNT: 12.2 % (ref 11.5–14.5)
EST. GFR  (NO RACE VARIABLE): >60 ML/MIN/1.73 M^2
GLUCOSE SERPL-MCNC: 79 MG/DL (ref 70–110)
HCT VFR BLD AUTO: 39.6 % (ref 37–48.5)
HGB BLD-MCNC: 13 G/DL (ref 12–16)
MCH RBC QN AUTO: 29.9 PG (ref 27–31)
MCHC RBC AUTO-ENTMCNC: 32.8 G/DL (ref 32–36)
MCV RBC AUTO: 91 FL (ref 82–98)
PLATELET # BLD AUTO: 273 K/UL (ref 150–450)
PMV BLD AUTO: 10.3 FL (ref 9.2–12.9)
POTASSIUM SERPL-SCNC: 5.1 MMOL/L (ref 3.5–5.1)
PROT SERPL-MCNC: 8.1 G/DL (ref 6–8.4)
RBC # BLD AUTO: 4.35 M/UL (ref 4–5.4)
SODIUM SERPL-SCNC: 133 MMOL/L (ref 136–145)
WBC # BLD AUTO: 4.75 K/UL (ref 3.9–12.7)

## 2024-04-19 PROCEDURE — 80053 COMPREHEN METABOLIC PANEL: CPT | Performed by: PSYCHIATRY & NEUROLOGY

## 2024-04-19 PROCEDURE — 99417 PROLNG OP E/M EACH 15 MIN: CPT | Mod: S$PBB,,, | Performed by: PSYCHIATRY & NEUROLOGY

## 2024-04-19 PROCEDURE — 99214 OFFICE O/P EST MOD 30 MIN: CPT | Mod: PBBFAC | Performed by: PSYCHIATRY & NEUROLOGY

## 2024-04-19 PROCEDURE — 85027 COMPLETE CBC AUTOMATED: CPT | Performed by: PSYCHIATRY & NEUROLOGY

## 2024-04-19 PROCEDURE — 99215 OFFICE O/P EST HI 40 MIN: CPT | Mod: S$PBB,,, | Performed by: PSYCHIATRY & NEUROLOGY

## 2024-04-19 PROCEDURE — 80177 DRUG SCRN QUAN LEVETIRACETAM: CPT | Performed by: PSYCHIATRY & NEUROLOGY

## 2024-04-19 PROCEDURE — 80339 ANTIEPILEPTICS NOS 1-3: CPT | Performed by: PSYCHIATRY & NEUROLOGY

## 2024-04-19 PROCEDURE — 99999 PR PBB SHADOW E&M-EST. PATIENT-LVL IV: CPT | Mod: PBBFAC,,, | Performed by: PSYCHIATRY & NEUROLOGY

## 2024-04-19 PROCEDURE — 36415 COLL VENOUS BLD VENIPUNCTURE: CPT | Performed by: PSYCHIATRY & NEUROLOGY

## 2024-04-19 RX ORDER — CLOBAZAM 20 MG/1
20 TABLET ORAL NIGHTLY
Qty: 30 TABLET | Refills: 5 | Status: SHIPPED | OUTPATIENT
Start: 2024-04-19 | End: 2024-05-03 | Stop reason: SDUPTHER

## 2024-04-19 RX ORDER — LEVETIRACETAM 1000 MG/1
2000 TABLET ORAL 2 TIMES DAILY
Qty: 360 TABLET | Refills: 3 | Status: SHIPPED | OUTPATIENT
Start: 2024-04-19

## 2024-04-19 RX ORDER — FUROSEMIDE 40 MG/1
80 TABLET ORAL
COMMUNITY
Start: 2024-04-18

## 2024-04-19 RX ORDER — MIDAZOLAM 5 MG/.1ML
1 SPRAY NASAL EVERY 10 MIN PRN
Qty: 2 EACH | Refills: 5 | Status: SHIPPED | OUTPATIENT
Start: 2024-04-19 | End: 2024-05-03 | Stop reason: SDUPTHER

## 2024-04-19 RX ORDER — OXCARBAZEPINE 300 MG/1
300 TABLET, FILM COATED ORAL 2 TIMES DAILY
COMMUNITY
Start: 2024-04-10 | End: 2024-04-19 | Stop reason: SDUPTHER

## 2024-04-19 RX ORDER — OXCARBAZEPINE 300 MG/1
300 TABLET, FILM COATED ORAL 2 TIMES DAILY
Qty: 180 TABLET | Refills: 3 | Status: SHIPPED | OUTPATIENT
Start: 2024-04-19 | End: 2024-05-03 | Stop reason: SDUPTHER

## 2024-04-19 RX ORDER — NIFEDIPINE 30 MG/1
30 TABLET, EXTENDED RELEASE ORAL 2 TIMES DAILY
COMMUNITY

## 2024-04-19 NOTE — PATIENT INSTRUCTIONS
You came to epilepsy clinic because of refractory seizures despite multiple anti-seizure medications. Please continue oxcarbazepine 300mg twice dmitriy, clobazam 20mg nightly and keppra 2000 twice daily.You are interested in getting more information about Epilepsy Surgery. I think you are a good candidate for a possible surgical intervention. I would like you to be seen by my neurocognitive colleagues. They will assess your cognition to help us understand how your brain works and what sort of risks surgery might have for you. They will also help us make sure you are as prepared as possible for this process.  Please call [phone] during normal working hours and ask for Rich Bower in order to schedule this.  I would like you to meet the neurosurgeons to discuss the possible surgical interventions so that you understand what kind of procedures are performed and possible risks and benefits.  I would like you to meet our epilepsy  who will help guide you through this process.  I have proved a prescription for a seizure watch. Check out the website.     Do not miss any doses of medication. If a dose of medication is missed, take it as soon as it is remembered even if that means doubling up on the dose. Please get a lab test to check out the blood level of medication. Get regular sleep. Go to sleep at the same time and wake up at the same time every day. People with epilepsy require more sleep than people without epilepsy.  Sleeping 10-12 hours a day can be normal for a person with epilepsy.  Every seizure makes it harder to prevent the next seizure. Epilepsy is associated with SUDEP, or sudden unexpected death in epilepsy.  The risk is significantly higher if convulsive seizures are not well controlled. For more information, check out these websites: https://www.epilepsy.com/, https://www.epilepsyalliance[place].org/, www.sera-epilepsy.org, www.womenandepilepsy.org.  If you are interested in meeting other  individuals in our epilepsy community, please reach out to the [facility] [place] ([phone], [phone], [email or web address]@[email or web address]).  They organize many informative and fun activities in the region.  They can provide you invaluable information on how to get access to resources available for patients living with epilepsy as well as a rich community of like-minded individuals who are all learning to cope with the same issues.  It is very important to remember, you are not alone.     Per [place] law, no episodes of loss of consciousness for 6 months before driving.  Avoid dangerous situations.  For example, no baths/pools alone, no heights, no power tools.  Wear a bike helmet.  If breakthrough seizures occur that are different in character, frequency, or duration from normal episodes, please patient portal me or call the office and we will decide the next steps. If multiple seizures occur in a row without return back to baseline, 911 needs to be called.     Return to clinic in 3 months or sooner with issues.  Please patient portal with any questions or concerns.    Gladis Gamez MD PhD [facility]  Neurology-Epilepsy  [facility].    Forms/Letters/Disability/DMV Paperwork: We understand the importance of filling out forms and providing letters in a timely manner.  However, many of these forms have very tricky language and once an official form is submitted as part of the medical record, it can not be modified or erased.  Please work with us in order to get these forms filled out in the most complete, accurate, and efficient way. 1.  Once you are aware that a form will need to be completed, please make an appointment.  A virtual appointment with Dr. Gamez or Maritza is perfectly fine. 2.  Please fill out the form as much as you can.  There are many questions that we do not have an answer for.  Please bring a blank copy of the form and your partially filled out form to the clinic visit or send  them to us over the portal.  We will complete the form together with you during the clinic visit, sign it, and either return it to you or send it to the correct destination.  Every form will require an appointment however we can fill out multiple forms at once if needed.  Please do not hesitate to reach out with any questions or concerns about this policy.  We are trying to make sure that we have a system in place to meet this need which works for everyone involved.  Thank you for your understanding.

## 2024-04-19 NOTE — TELEPHONE ENCOUNTER
Roomed patient. Took vitals. Patient was very nervous about her appointment. We sat and spoke for a few minutes and it seemed to calm her down. When I left the room she was fine.
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
## 2024-04-23 LAB
CLOBAZAM SERPL-MCNC: 116 NG/ML (ref 30–300)
LEVETIRACETAM SERPL-MCNC: 80.6 UG/ML (ref 3–60)
NORCLOBAZAM SERPL-MCNC: 856 NG/ML (ref 300–3000)
OXCARBAZEPINE METABOLITE: 12 MCG/ML (ref 10–35)
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
## 2024-04-29 ENCOUNTER — OFFICE VISIT (OUTPATIENT)
Dept: NEUROLOGY | Facility: CLINIC | Age: 69
End: 2024-04-29
Payer: MEDICARE

## 2024-04-29 DIAGNOSIS — Z86.79 HISTORY OF SPONTANEOUS INTRAPARENCHYMAL INTRACRANIAL HEMORRHAGE: ICD-10-CM

## 2024-04-29 DIAGNOSIS — G40.919 REFRACTORY EPILEPSY: Primary | ICD-10-CM

## 2024-05-01 ENCOUNTER — OFFICE VISIT (OUTPATIENT)
Dept: NEUROLOGY | Facility: CLINIC | Age: 69
End: 2024-05-01
Payer: MEDICARE

## 2024-05-01 DIAGNOSIS — G31.84 MILD COGNITIVE IMPAIRMENT: ICD-10-CM

## 2024-05-01 DIAGNOSIS — F43.23 ADJUSTMENT DISORDER WITH MIXED ANXIETY AND DEPRESSED MOOD: Primary | ICD-10-CM

## 2024-05-01 DIAGNOSIS — G40.919 REFRACTORY EPILEPSY: ICD-10-CM

## 2024-05-01 DIAGNOSIS — F06.70 MILD NEUROCOGNITIVE DISORDER DUE TO ANOTHER MEDICAL CONDITION: ICD-10-CM

## 2024-05-01 PROCEDURE — 96133 NRPSYC TST EVAL PHYS/QHP EA: CPT | Mod: ,,, | Performed by: STUDENT IN AN ORGANIZED HEALTH CARE EDUCATION/TRAINING PROGRAM

## 2024-05-01 PROCEDURE — 99212 OFFICE O/P EST SF 10 MIN: CPT | Mod: PBBFAC | Performed by: STUDENT IN AN ORGANIZED HEALTH CARE EDUCATION/TRAINING PROGRAM

## 2024-05-01 PROCEDURE — 99999 PR PBB SHADOW E&M-EST. PATIENT-LVL II: CPT | Mod: PBBFAC,,, | Performed by: STUDENT IN AN ORGANIZED HEALTH CARE EDUCATION/TRAINING PROGRAM

## 2024-05-01 PROCEDURE — 96139 PSYCL/NRPSYC TST TECH EA: CPT | Mod: ,,, | Performed by: STUDENT IN AN ORGANIZED HEALTH CARE EDUCATION/TRAINING PROGRAM

## 2024-05-01 PROCEDURE — 96138 PSYCL/NRPSYC TECH 1ST: CPT | Mod: ,,, | Performed by: STUDENT IN AN ORGANIZED HEALTH CARE EDUCATION/TRAINING PROGRAM

## 2024-05-01 PROCEDURE — 96132 NRPSYC TST EVAL PHYS/QHP 1ST: CPT | Mod: ,,, | Performed by: STUDENT IN AN ORGANIZED HEALTH CARE EDUCATION/TRAINING PROGRAM

## 2024-05-01 PROCEDURE — 99499 UNLISTED E&M SERVICE: CPT | Mod: S$PBB,,, | Performed by: STUDENT IN AN ORGANIZED HEALTH CARE EDUCATION/TRAINING PROGRAM

## 2024-05-03 ENCOUNTER — PATIENT MESSAGE (OUTPATIENT)
Dept: NEUROLOGY | Facility: CLINIC | Age: 69
End: 2024-05-03
Payer: MEDICARE

## 2024-05-03 DIAGNOSIS — G40.919 REFRACTORY EPILEPSY: ICD-10-CM

## 2024-05-04 RX ORDER — OXCARBAZEPINE 300 MG/1
300 TABLET, FILM COATED ORAL 2 TIMES DAILY
Qty: 180 TABLET | Refills: 3 | Status: SHIPPED | OUTPATIENT
Start: 2024-05-04 | End: 2025-04-29

## 2024-05-04 RX ORDER — MIDAZOLAM 5 MG/.1ML
1 SPRAY NASAL EVERY 10 MIN PRN
Qty: 2 EACH | Refills: 5 | Status: SHIPPED | OUTPATIENT
Start: 2024-05-04

## 2024-05-04 RX ORDER — CLOBAZAM 20 MG/1
20 TABLET ORAL NIGHTLY
Qty: 30 TABLET | Refills: 5 | Status: SHIPPED | OUTPATIENT
Start: 2024-05-04 | End: 2024-05-08 | Stop reason: SDUPTHER

## 2024-05-04 NOTE — TELEPHONE ENCOUNTER
Clobazam 20 mg nightly  Oxcarbazepine 300 mg twice daily  Rescue midazolam nasal spray    Gladis Gamez MD PhD [facility]  Neurology-Epilepsy  [facility].

## 2024-05-06 ENCOUNTER — TELEPHONE (OUTPATIENT)
Dept: NEUROLOGY | Facility: CLINIC | Age: 69
End: 2024-05-06
Payer: MEDICARE

## 2024-05-06 NOTE — TELEPHONE ENCOUNTER
Called the patient to schedule a follow up with Dr. Gamez per her request. Pt agreed to her next available apt date and time which was 5/8 at 2:30 pm.

## 2024-05-08 ENCOUNTER — OFFICE VISIT (OUTPATIENT)
Dept: NEUROLOGY | Facility: CLINIC | Age: 69
End: 2024-05-08
Payer: MEDICARE

## 2024-05-08 VITALS
SYSTOLIC BLOOD PRESSURE: 95 MMHG | DIASTOLIC BLOOD PRESSURE: 68 MMHG | WEIGHT: 163.38 LBS | BODY MASS INDEX: 24.76 KG/M2 | HEART RATE: 90 BPM | HEIGHT: 68 IN

## 2024-05-08 DIAGNOSIS — G40.909 SECONDARY SEIZURE DISORDER: ICD-10-CM

## 2024-05-08 DIAGNOSIS — G40.219 LOCALIZATION-RELATED (FOCAL) (PARTIAL) SYMPTOMATIC EPILEPSY AND EPILEPTIC SYNDROMES WITH COMPLEX PARTIAL SEIZURES, INTRACTABLE, WITHOUT STATUS EPILEPTICUS: ICD-10-CM

## 2024-05-08 DIAGNOSIS — I50.42 CHRONIC COMBINED SYSTOLIC AND DIASTOLIC CONGESTIVE HEART FAILURE: ICD-10-CM

## 2024-05-08 DIAGNOSIS — G40.919 REFRACTORY EPILEPSY: Primary | ICD-10-CM

## 2024-05-08 DIAGNOSIS — Z86.79 HISTORY OF SPONTANEOUS INTRAPARENCHYMAL INTRACRANIAL HEMORRHAGE: ICD-10-CM

## 2024-05-08 PROCEDURE — 99214 OFFICE O/P EST MOD 30 MIN: CPT | Mod: PBBFAC | Performed by: PSYCHIATRY & NEUROLOGY

## 2024-05-08 PROCEDURE — 99999 PR PBB SHADOW E&M-EST. PATIENT-LVL IV: CPT | Mod: PBBFAC,,, | Performed by: PSYCHIATRY & NEUROLOGY

## 2024-05-08 PROCEDURE — 99215 OFFICE O/P EST HI 40 MIN: CPT | Mod: S$PBB,,, | Performed by: PSYCHIATRY & NEUROLOGY

## 2024-05-08 RX ORDER — CLOBAZAM 20 MG/1
40 TABLET ORAL NIGHTLY
Qty: 60 TABLET | Refills: 5 | Status: SHIPPED | OUTPATIENT
Start: 2024-05-08 | End: 2024-11-04

## 2024-05-08 RX ORDER — POTASSIUM CHLORIDE 20 MEQ/15ML
40 SOLUTION ORAL 2 TIMES DAILY
COMMUNITY

## 2024-05-08 NOTE — PROGRESS NOTES
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 tablet 5     No current facility-administered medications for this visit.        FAMILY HISTORY: No family history on file.      SOCIAL HISTORY:   Social History     Socioeconomic History    Marital status:    Tobacco Use    Smoking status: Former     Types: Cigarettes    Smokeless tobacco: Never     Social Determinants of Health     Financial Resource Strain: Low Risk  (3/20/2024)    Overall Financial Resource Strain (CARDIA)     Difficulty of Paying Living Expenses: Not hard at all   Food Insecurity: No Food Insecurity (3/20/2024)    Hunger Vital Sign     Worried About Running Out of Food in the Last Year: Never true     Ran Out of Food in the Last Year: Never true   Transportation Needs: No Transportation Needs (3/20/2024)    PRAPARE - Transportation     Lack of Transportation (Medical): No     Lack of Transportation (Non-Medical): No   Physical Activity: Insufficiently Active (3/19/2024)    Exercise Vital Sign     Days of Exercise per Week: 3 days     Minutes of Exercise per Session: 30 min   Stress: No Stress Concern Present (3/20/2024)    [employer] - Occupational Stress Questionnaire     Feeling of Stress : Only a little   Housing Stability: Low Risk  (3/20/2024)    Housing Stability Vital Sign     Unable to Pay for Housing  in the Last Year: No     Number of Places Lived in the Last Year: 1     Unstable Housing in the Last Year: No         Questions and concerns raised by the patient and family/care-giver(s) were addressed and they indicated understanding of everything discussed and agreed to plans as above.    Gladis Gamez MD PhD [facility]  Neurology-Epilepsy  [facility].

## 2024-05-08 NOTE — PATIENT INSTRUCTIONS
You came to epilepsy clinic because of refractory seizures despite multiple anti-seizure medications. Please continue oxcarbazepine 300mg twice daily and keppra 2000 twice daily.  Please increase clobazam to 40mg nightly.  You are interested in getting more information about Epilepsy Surgery. I think you are a good candidate for a possible surgical intervention. I would like you to meet the neurosurgeons to discuss the possible surgical interventions so that you understand what kind of procedures are performed and possible risks and benefits.  I would like you to meet our epilepsy  who will help guide you through this process.  I have proved a prescription for a seizure watch. Check out the website.      Do not miss any doses of medication. If a dose of medication is missed, take it as soon as it is remembered even if that means doubling up on the dose. Please get a lab test to check out the blood level of medication. Get regular sleep. Go to sleep at the same time and wake up at the same time every day. People with epilepsy require more sleep than people without epilepsy.  Sleeping 10-12 hours a day can be normal for a person with epilepsy.  Every seizure makes it harder to prevent the next seizure. Epilepsy is associated with SUDEP, or sudden unexpected death in epilepsy.  The risk is significantly higher if convulsive seizures are not well controlled. For more information, check out these websites: https://www.epilepsy.com/, https://www.epilepsyalliance[place].org/, www.sera-epilepsy.org, www.womenandepilepsy.org.  If you are interested in meeting other individuals in our epilepsy community, please reach out to the [facility] [place] ([phone], [phone], [email or web address]@[email or web address]).  They organize many informative and fun activities in the region.  They can provide you invaluable information on how to get access to resources available for patients living with epilepsy as well as a  rich community of like-minded individuals who are all learning to cope with the same issues.  It is very important to remember, you are not alone.      Per [place] law, no episodes of loss of consciousness for 6 months before driving.  Avoid dangerous situations.  For example, no baths/pools alone, no heights, no power tools.  Wear a bike helmet.  If breakthrough seizures occur that are different in character, frequency, or duration from normal episodes, please patient portal me or call the office and we will decide the next steps. If multiple seizures occur in a row without return back to baseline, 911 needs to be called.      Return to clinic in 3 months or sooner with issues.  Please patient portal with any questions or concerns.     Gladis Gamez MD PhD [facility]  Neurology-Epilepsy  [facility].     Forms/Letters/Disability/DMV Paperwork: We understand the importance of filling out forms and providing letters in a timely manner.  However, many of these forms have very tricky language and once an official form is submitted as part of the medical record, it can not be modified or erased.  Please work with us in order to get these forms filled out in the most complete, accurate, and efficient way. 1.  Once you are aware that a form will need to be completed, please make an appointment.  A virtual appointment with Dr. Gamez or Maritza is perfectly fine. 2.  Please fill out the form as much as you can.  There are many questions that we do not have an answer for.  Please bring a blank copy of the form and your partially filled out form to the clinic visit or send them to us over the portal.  We will complete the form together with you during the clinic visit, sign it, and either return it to you or send it to the correct destination.  Every form will require an appointment however we can fill out multiple forms at once if needed.  Please do not hesitate to reach out with any questions or concerns about  this policy.  We are trying to make sure that we have a system in place to meet this need which works for everyone involved.  Thank you for your understanding.

## 2024-05-13 NOTE — PROGRESS NOTES
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 [place] Naming Test (BNT; [facility] norms), Auditory Naming Test (ANT), Noé Auditory Verbal Learning Test (RAVLT), Wechsler Memory Scale, 4th Ed. Logical Memory (WMS IV-LM), Noé-Osterrieth Complex Figure Test (RCFT), [place] Card Sorting Test-128 (WCST-128), Trail Making Test (TMT A/B; Richy norms), Grooved Pegboard Test (Richy norms), Reaves Depression Inventory, 2nd Ed. (BDI-2); and Reaves Anxiety Inventory (KATHLEEN), Quality of Live in Epilepsy-31 (QOLIE-31).    Score Label T-Score Standard Score Z-Score Scaled Score %ile Rank   Exceptionally High > 70 > 130 > 2.0  > 16 > 98   Above Average 64-69 120-129 1.4-1.9 15 91-97   High Average 57-63 110-119 0.7-1.3 12-14 75-90   Average 44-56  0.6 to -0.6 8-11 25-74   Low Average 37-43 80-89 -1.3 to -0.7 6-7 9-24   Below Average 30-36 70-79 -2.0 to -1.4 4-5 2-8   Exceptionally Low < 30 < 70  < -2.0 < 4 < 2       Performance Validity: Ms. Linda completed both stand-alone and embedded measures of task engagement. Below-cutoff performance on any one stand-alone measure and/ or any two embedded measures of task engagement is highly unlikely to  occur outside the context of poor or inconsistent effort during testing. Ms. Linda scored above predetermined cutoffs on all administered measures of task engagement. As such, there is no evidence to suggest poor or inconsistent engagement and their performance is deemed to be a reasonable reflection of their day-to-day cognitive status.        Raw Score Type of Standardized Score Standardized Score Percentile/CP Descriptor   TOMM 1 50 - - - -   ACS LM II Rec 22 - - - -   ACS VR II Rec 39 - - - -   ACS RDS 10 - - - -   PREMORBID FUNCTIONING Raw Score Type of Standardized Score Standardized Score Percentile/CP Descriptor   TOPF simple dem. eFSIQ -  77 High Average   TOPF pred. eFSIQ -  84 High Average   TOPF simple + pred. eFSIQ -  79 High Average   INTELLECTUAL FUNCTIONING Raw Score Type of Standardized Score Standardized Score Percentile/CP Descriptor   WAIS-IV         VCI -  79 High Average   AMY -  77 High Average   WMI - SS 97 42 Average   PSI - SS 89 23 Low Average   GAI - ss 113 81 High Average   LANGUAGE FUNCTIONING Raw Score Type of Standardized Score Standardized Score Percentile/CP Descriptor   WAIS-IV Vocabulary 49 ss 13 84 High Average   WAIS-IV Information 16 ss 11 63 Average   TOPF Word Reading 61  88 High Average   BNT-60 54 Tscore 41 18 Low Average   FAS 51 Tscore 55 69 Average   Animal Naming 23 Tscore 55 69 Average   Auditory Naming RT 1.01 zscore -0.259 40 Average   Auditory Naming TC 50 zscore 0.675 75 High Average   Auditory Naming TOT 2 zscore 0.359 64 Average   VISUOSPATIAL FUNCTIONING Raw Score Type of Standardized Score Standardized Score Percentile/CP Descriptor   WAIS-IV Block Design 24 ss 8 25 Average   WAIS-IV Visual Puzzles 16 ss 14 91 Above Average   RCFT Copy 29 - - 11-16 Low Average   RCFT Time to Copy 362 - - >16 WNL   VR Copy 42 - - 26-50 Average   LEARNING & MEMORY Raw Score Type of Standardized Score Standardized Score Percentile/CP  Descriptor   RAVLT         Trial 1 5 Tscore 44 27 Average   Trial 2 9 Tscore 51 54 Average   Trial 3 11 Tscore 50 50 Average   Trial 4 14 Tscore 62 88 High Average   Trial 5 12 Tscore 46 34 Average   Trials 1-5 Total 51 Tscore 53 62 Average   List B 5 Tscore 48 42 Average   Trial 6 (short delay) 10 Tscore 47 38 Average   30-min Recall (long delay) 8 Tscore 43 25 Average   Recognition Hits 15 Tscore - - -   Recognition FP Errors 4 Tscore - - -   Recognition Percentage Correct - Tscore 38 12 Low Average   WMS-IV         Visual Memory - SS 98 45 Average   WMS-IV Subtests         LM I 33 ss 10 50 Average   LM II 23 ss 12 75 High Average   LM Recognition 22 - - >75 High Average   VR I 28 ss 8 25 Average   VR II 24 ss 11 63 Average   VR II Recognition 5 - - 51-75 Average   VR Copy 42 - - 26-50 Average   ACS Social Cognition         Faces I 87 ss 11 63 Average   Faces II 25 ss 11 63 Average   Faces Content 39 ss 11 63 Average   Faces Spatial 40 ss 9 37 Average   ATTENTION/WORKING MEMORY Raw Score Type of Standardized Score Standardized Score Percentile/CP Descriptor   WAIS-IV Digit Span 26 ss 10 50 Average         DS Forward 11 ss 11 63 Average         DS Backward 9 ss 11 63 Average         DS Sequence 6 ss 8 25 Average         Longest Digit Forward 8 - - - -         Longest Digit Backward 5 - - - -         Longest Digit Sequence 4 - - - -   WAIS-IV Arithmetic 12 ss 9 37 Average   MENTAL PROCESSING SPEED Raw Score Type of Standardized Score Standardized Score Percentile/CP Descriptor   WAIS-IV Symbol Search 21 ss 8 25 Average   WAIS-IV Coding 45 ss 8 25 Average   TMT A  42 Tscore 40 16 Low Average   TMT A errors 0 - - - -   EXECUTIVE FUNCTIONING Raw Score Type of Standardized Score Standardized Score Percentile/CP Descriptor   TMT B 72 Tscore 49 46 Average   TMT B errors 0 - - - -   WCST-128 N/A        Total Correct 91   - -   Total Errors 37 SS 90 25 Average   Perseverative Resp. 18 SS 96 39 Average   Perseverative Err.  18 SS 94 34 Average   Nonperseverative Err. 19 SS 85 16 Low Average   Concept. Level Response 81 SS 94 34 Average   Categories Completed 5 - - >16 WNL   FMS 2 - - 11-16 Low Average   Learning to Learn -7.75 - - 11-16 Low Average   WAIS-IV Similarities 30 ss 13 84 High Average   WAIS-IV Matrix Reasoning 20 ss 14 91 Above Average   FRONTOMOTOR  Raw Score Type of Standardized Score Standardized Score Percentile/CP Descriptor   GPT DH 73 Tscore 48 42 Average   GPD NDH 79 Tscore 49 46 Average   MOOD & PERSONALITY Raw Score Type of Standardized Score Standardized Score Percentile/CP Descriptor   BDI-2 21 - - - Moderate   KATHLEEN 25 - - - Moderate   QOLIE-31         Seizure Worry 4 Tscore 29 2 Clinically Significant   Overall Quality of Life 27.5 Tscore 28 2 Clinically Significant   Emotional Well-Being 52 Tscore 42 22 WNL   Energy/Fatigue 20 Tscore 33 5 Moderate   Cognitive 36.67 Tscore 40 15 Moderate   Medication Effects 0.00 Tscore 32 3 Moderate   Social Function 4 Tscore 26 1 Clinically Significant   Overall Score - Tscore 27 1 Clinically Significant   ss = scaled score (mean = 10, SD = 3); SS = standard score (mean = 100, SD = 15); Tscore mean = 50, SD = 10; zscore (mean = 0.00, SD = 1)       Billing Documentation     Time spent conducting via tele-health (audio and video) interview with the patient: 60 minutes; 14027.  Time on review of neuropsychological test data and relevant records, data interpretation, providing feedback about these results, and writing/ documentation: 155 minutes; 86759 &37889 (x2).  Psychometrist time spent in the administration and scoring of 2 or more neuropsychological tests 418 minutes; 89327 & 52578 (x13).      Referral Diagnoses: Refractory epilepsy [G40.919]    Thank you for the opportunity to assist in the care of your patient. Please do not hesitate to contact Dr. Chen at [phone] or via Epic staff message if he can be of further assistance.    Carmencita Ceron Psy.D.  Postdoctoral  Fellow in Clinical Neuropsychology  [facility] - Department of Neurology    Ion Chen, Ph.D.  Certified Clinical Psychologist  [facility] - Department of Neurology

## 2024-05-15 PROBLEM — F43.23 ADJUSTMENT DISORDER WITH MIXED ANXIETY AND DEPRESSED MOOD: Status: ACTIVE | Noted: 2024-05-15

## 2024-05-16 ENCOUNTER — PATIENT MESSAGE (OUTPATIENT)
Dept: NEUROLOGY | Facility: CLINIC | Age: 69
End: 2024-05-16
Payer: MEDICARE

## 2024-05-16 PROBLEM — G31.84 MILD COGNITIVE IMPAIRMENT: Status: ACTIVE | Noted: 2024-05-16

## 2024-06-04 ENCOUNTER — TELEPHONE (OUTPATIENT)
Dept: NEUROSURGERY | Facility: CLINIC | Age: 69
End: 2024-06-04
Payer: MEDICARE

## 2024-06-04 DIAGNOSIS — R56.9 SEIZURE: Primary | ICD-10-CM

## 2024-06-05 ENCOUNTER — OFFICE VISIT (OUTPATIENT)
Dept: NEUROLOGY | Facility: CLINIC | Age: 69
End: 2024-06-05
Payer: MEDICARE

## 2024-06-05 ENCOUNTER — TELEPHONE (OUTPATIENT)
Dept: PREADMISSION TESTING | Facility: HOSPITAL | Age: 69
End: 2024-06-05

## 2024-06-05 DIAGNOSIS — G31.84 MILD NEUROCOGNITIVE DISORDER: Primary | ICD-10-CM

## 2024-06-05 PROBLEM — F06.70 MILD NEUROCOGNITIVE DISORDER DUE TO ANOTHER MEDICAL CONDITION: Status: ACTIVE | Noted: 2024-06-05

## 2024-06-05 PROCEDURE — 99499 UNLISTED E&M SERVICE: CPT | Mod: 95,,, | Performed by: STUDENT IN AN ORGANIZED HEALTH CARE EDUCATION/TRAINING PROGRAM

## 2024-06-05 PROCEDURE — 96132 NRPSYC TST EVAL PHYS/QHP 1ST: CPT | Mod: 95,,, | Performed by: STUDENT IN AN ORGANIZED HEALTH CARE EDUCATION/TRAINING PROGRAM

## 2024-06-05 NOTE — PROGRESS NOTES
Ms. Linda and her  attended a tele-health (audio and video) feedback session to discuss the results from her recent neuropsychological testing (see report dated 5/1/2024). We discussed her test results, diagnoses, and our recommendations. Ms. Linda voiced her understanding of the information provided, and voiced her intention to follow through on the recommendations provided. All questions were answered to her satisfaction and Ms. Linda was provided with a copy of her report through the mail. She was encouraged to contact Dr. Chne with any future questions or concerns.     Carmencita Ceron Psy.D.  Postdoctoral Fellow in Clinical Neuropsychology  [facility] - Department of Neurology    Ion Chen, Ph.D.  Certified Clinical Psychologist  [facility] - Department of Neurology       Billing Documentation     Time on review of neuropsychological test data and relevant records, data interpretation, providing feedback about these results, and writing/ documentation: 48 minutes; 34042

## 2024-06-07 ENCOUNTER — TELEPHONE (OUTPATIENT)
Dept: NEUROLOGY | Facility: CLINIC | Age: 69
End: 2024-06-07
Payer: MEDICARE

## 2024-06-07 ENCOUNTER — OFFICE VISIT (OUTPATIENT)
Dept: NEUROLOGY | Facility: CLINIC | Age: 69
End: 2024-06-07
Payer: MEDICARE

## 2024-06-07 ENCOUNTER — SOCIAL WORK (OUTPATIENT)
Dept: NEUROLOGY | Facility: CLINIC | Age: 69
End: 2024-06-07
Payer: MEDICARE

## 2024-06-07 DIAGNOSIS — G40.919 REFRACTORY EPILEPSY: ICD-10-CM

## 2024-06-07 PROCEDURE — 90791 PSYCH DIAGNOSTIC EVALUATION: CPT | Mod: 95,,, | Performed by: SOCIAL WORKER
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
## 2024-06-10 ENCOUNTER — TELEPHONE (OUTPATIENT)
Dept: INTERVENTIONAL RADIOLOGY/VASCULAR | Facility: HOSPITAL | Age: 69
End: 2024-06-10
Payer: MEDICARE

## 2024-06-10 NOTE — NURSING
Pre-procedure call complete.  2 patient identifier used (name and ).  Pt instructed not to eat or drink anything after midnight the night before procedure.  Pt aware will need someone to provide transport home and monitor pt 8 hours post procedure.  No driving for at least 24 hours after procedure.   Patient advised to take blood pressure/heart medications and seizure with a sip of water morning of procedure.  Patient verbalized aware of which medications to take.  Do not take sleep medication (including OTC) the night before procedure.  Arrival time and location given.  Expected length of stay reviewed.  Covid screening completed.  Pt verbalized understanding of all pre-procedure instructions.  Written instructions and directions sent to patient in [facility]/portal.

## 2024-06-11 ENCOUNTER — HOSPITAL ENCOUNTER (OUTPATIENT)
Dept: INTERVENTIONAL RADIOLOGY/VASCULAR | Facility: HOSPITAL | Age: 69
Discharge: HOME | End: 2024-06-11
Attending: STUDENT IN AN ORGANIZED HEALTH CARE EDUCATION/TRAINING PROGRAM | Admitting: RADIOLOGY
Payer: MEDICARE

## 2024-06-11 ENCOUNTER — PATIENT MESSAGE (OUTPATIENT)
Dept: NEUROLOGY | Facility: CLINIC | Age: 69
End: 2024-06-11
Payer: MEDICARE

## 2024-06-11 VITALS
HEART RATE: 68 BPM | WEIGHT: 163 LBS | TEMPERATURE: 96 F | HEIGHT: 68 IN | RESPIRATION RATE: 17 BRPM | BODY MASS INDEX: 24.71 KG/M2 | DIASTOLIC BLOOD PRESSURE: 66 MMHG | OXYGEN SATURATION: 98 % | SYSTOLIC BLOOD PRESSURE: 118 MMHG

## 2024-06-11 DIAGNOSIS — R56.9 SEIZURE: ICD-10-CM

## 2024-06-11 PROCEDURE — C1769 GUIDE WIRE: HCPCS

## 2024-06-11 PROCEDURE — 63600175 PHARM REV CODE 636 W HCPCS: Performed by: STUDENT IN AN ORGANIZED HEALTH CARE EDUCATION/TRAINING PROGRAM

## 2024-06-11 PROCEDURE — C1894 INTRO/SHEATH, NON-LASER: HCPCS

## 2024-06-11 RX ORDER — LIDOCAINE HYDROCHLORIDE 10 MG/ML
1 INJECTION, SOLUTION EPIDURAL; INFILTRATION; INTRACAUDAL; PERINEURAL ONCE
Status: DISCONTINUED | OUTPATIENT
Start: 2024-06-11 | End: 2024-06-12 | Stop reason: HOSPADM

## 2024-06-11 RX ORDER — SODIUM CHLORIDE 9 MG/ML
INJECTION, SOLUTION INTRAVENOUS CONTINUOUS
Status: DISCONTINUED | OUTPATIENT
Start: 2024-06-11 | End: 2024-06-12 | Stop reason: HOSPADM

## 2024-06-11 RX ORDER — FENTANYL CITRATE 50 UG/ML
INJECTION, SOLUTION INTRAMUSCULAR; INTRAVENOUS
Status: COMPLETED | OUTPATIENT
Start: 2024-06-11 | End: 2024-06-11

## 2024-06-11 RX ORDER — SODIUM CHLORIDE 0.9 % (FLUSH) 0.9 %
10 SYRINGE (ML) INJECTION
Status: DISCONTINUED | OUTPATIENT
Start: 2024-06-11 | End: 2024-06-12 | Stop reason: HOSPADM

## 2024-06-11 RX ORDER — MIDAZOLAM HYDROCHLORIDE 1 MG/ML
INJECTION, SOLUTION INTRAMUSCULAR; INTRAVENOUS
Status: COMPLETED | OUTPATIENT
Start: 2024-06-11 | End: 2024-06-11

## 2024-06-11 RX ORDER — TORSEMIDE 20 MG/1
20 TABLET ORAL DAILY
COMMUNITY

## 2024-06-11 RX ADMIN — MIDAZOLAM HYDROCHLORIDE 0.5 MG: 2 INJECTION, SOLUTION INTRAMUSCULAR; INTRAVENOUS at 09:06

## 2024-06-11 RX ADMIN — FENTANYL CITRATE 25 MCG: 50 INJECTION, SOLUTION INTRAMUSCULAR; INTRAVENOUS at 10:06

## 2024-06-11 RX ADMIN — FENTANYL CITRATE 50 MCG: 50 INJECTION, SOLUTION INTRAMUSCULAR; INTRAVENOUS at 09:06

## 2024-06-11 NOTE — H&P
Interventional Neuroradiology Pre-procedure Note    Procedure: Diagnostic cerebral angiogram    History of Present Illness:  Haydee Linda is a 68 y.o. female with medically refractory epilepsy who presents for DSA to rule out the possible vascular malformation in the right temporal lobe..    Admission H&P reviewed.    ROS:   Hematological: no known coagulopathies  Respiratory: no shortness of breath  Cardiovascular: no chest pain  Gastrointestinal: no abdominal pain  Genito-Urinary: no dysuria  Musculoskeletal: negative  Neurological: no TIA or stroke symptoms     Scheduled Meds:    LIDOcaine (PF) 10 mg/ml (1%)  1 mL Other Once     Current Meds:   Current Outpatient Medications:     cloBAZam (ONFI) 20 mg Tab, Take 2 tablets (40 mg total) by mouth every evening., Disp: 60 tablet, Rfl: 5    furosemide (LASIX) 40 MG tablet, Take 80 mg by mouth as needed (heart failure)., Disp: , Rfl:     levETIRAcetam (KEPPRA) 1000 MG tablet, Take 2 tablets (2,000 mg total) by mouth 2 (two) times daily., Disp: 360 tablet, Rfl: 3    losartan (COZAAR) 50 MG tablet, Take 50 mg by mouth., Disp: , Rfl:     magnesium gluconate 27.5 mg magne- sium (500 mg) Tab, Take 500 mg by mouth., Disp: , Rfl:     midazolam (NAYZILAM) 5 mg/spray (0.1 mL) Spry, 1 spray by Nasal route every 10 (ten) minutes as needed (cluster seizures). 1 spray for seizures more than 3min or more than 3 seizures in 24 hours. May give second dose after 10 min if seizures persist., Disp: 2 each, Rfl: 5    NIFEdipine (PROCARDIA-XL) 30 MG (OSM) 24 hr tablet, Take 30 mg by mouth 2 (two) times a day., Disp: , Rfl:     OXcarbazepine (TRILEPTAL) 300 MG Tab, Take 1 tablet (300 mg total) by mouth 2 (two) times daily., Disp: 180 tablet, Rfl: 3    potassium chloride 10% (KAYCIEL) 20 mEq/15 mL oral solution, Take 40 mEq by mouth 2 (two) times daily., Disp: , Rfl:     Current Facility-Administered Medications:     0.9%  NaCl infusion, , Intravenous, Continuous, Kathryn Jo  HChapis, NP    LIDOcaine (PF) 10 mg/ml (1%) injection 10 mg, 1 mL, Other, Once, Kathryn Jo NP   Continuous Infusions:    sodium chloride 0.9%   Intravenous Continuous         PRN Meds:    Allergies: Review of patient's allergies indicates:  No Known Allergies  Sedation Hx: No adverse events.    Labs:        BUN/Cr/glu/ALT/AST/amyl/lip:  33/1.2/--/--/--/--/-- (06/11 0731)     Objective:  Vitals: There were no vitals taken for this visit.     Physical Exam:  General: well developed, well nourished, no distress.   Head: normocephalic, atraumatic  Neck: No tracheal deviation. No palpable masses. Full ROM.   Neurologic: Alert and oriented. Thought content appropriate.  GCS: E4 V5 M6; Total: 15  Language: No aphasia  Speech: No dysarthria  Cranial nerves: face symmetric, tongue midline, CN II-XII grossly intact.   Eyes: pupils equal, round, reactive to light with accomodation, EOMI.   Pulmonary: normal respirations, no signs of respiratory distress  Abdomen: soft, non-distended, not tender to palpation  Vascular: Pulses 2+ and symmetric radial and dorsalis pedis. No LE edema.   Skin: Skin is warm, dry and intact.  Sensory: intact to light touch throughout  Motor Strength: Moves all extremities spontaneously with good tone.  Full strength upper and lower extremities. No abnormal movements seen.     ASA: 2  MAL: 2    Plan:  -Plan for cerebral angiogram   -Sedation Plan: moderate sedation  -All diagnostics and imaging reviewed  -Patient NPO since MN  -Risks & benefits of procedure explained in detail; patient consented and all questions answered  -Further reccs to follow procedure          Moises Bonds MD, MHA  Fellow, NeuroEndovascular Surgery, [facility] David Nicole  Neurologist, [facility] [place], [place]

## 2024-06-11 NOTE — CARE UPDATE
Pt arrived to MPU 5 for recovery of a cerebral angiogram. Pt to recover until 12:53 and then /dc home.

## 2024-06-11 NOTE — PLAN OF CARE
Pt tolerated cerebral angiogram well, 5fr mynx closure device place, hemostasis obtain @ 1053, bed rest up at 1253, recovery in mpu, rg groin dressing c/d/I, report given @ bedside

## 2024-06-11 NOTE — NURSING
Pt given discharge instructions with all questions addressed.  Pt provided phone numbers to the clinic and resident on call for any other concerns.

## 2024-06-11 NOTE — DISCHARGE INSTRUCTIONS
For scheduling: Call Norma at [phone]    For questions or concerns call: JOSE MON-FRI 8 AM- 5PM [phone]. Radiology resident on call [phone].    For immediate concerns that are not emergent, you may call our radiology clinic at: [phone]

## 2024-06-11 NOTE — NURSING
Per Dr. Bonds, robbie for pt to take home meds except for Losartan and Demadex, pt was notified of this and voiced understanding.

## 2024-06-11 NOTE — PROCEDURES
Interventional Neuroradiology Post-Procedure Note    Pre Op Diagnosis: Suspected right sided AVM    Post Op Diagnosis: Right temporal AVM    Procedure: Diagnostic cerebral angiogram    Procedure performed by: Elana MOSES, Bill; Aliyah MOSES, Gómez; Cammie MOSES, Moises    Written Informed Consent Obtained: Yes    Specimen Removed: NO    Estimated Blood Loss: Minimal    Procedure report:     A 5F sheath was placed into the right femoral artery and a 5F Ubrt catheter was advanced into the aortic arch.  The bilateral ECA, ICA and vertebral arteries were subselected and angiography of the brain was performed after injection into each of these vessels.    Preliminary interpretation: Small right temporal lobe AVM supplied by a right MCA branch with early superficial cortical venous drainage to the superior sagittal sinus.     A right femoral artery angiogram was performed, the sheath removed and hemostasis achieved using 5F Mynx.  No hematoma was present at the time of hemostasis.    The patient tolerated the procedure well.     Plan:  -Bed rest for 2h  -Groin check and pulse check q2h   -Avoid carrying heavy weights > 10 lbs x 24 hrs   -Remove groin dressing tomorrow                       Moises Bonds MD, A  Fellow, NeuroEndovascular Surgery, [facility]  Neurologist, [facility] [place], [place]

## 2024-06-11 NOTE — PLAN OF CARE
Pt arrived to 104 for cerebral angiogram. Pt oriented to unit and staff, Pt safely transferred from stretcher to procedural table. Fall risk reviewed and comfort measures utilized with interventions. Safety strap applied, position pillows to minimize pressure points. Blankets applied. Pt prepped and draped utilizing standard sterile technique. Patient placed on continuous monitoring, as required by sedation policy. Timeouts implemented utilizing standard universal time-out per department and facility policy. RN to remain at bedside with continuous monitoring. Pt resting comfortably. Denies pain/discomfort. Will continue to monitor. See flow sheets for monitoring, medication administration, and updates. patient verbalizes understanding.

## 2024-06-13 ENCOUNTER — OFFICE VISIT (OUTPATIENT)
Dept: NEUROSURGERY | Facility: CLINIC | Age: 69
End: 2024-06-13
Payer: MEDICARE

## 2024-06-13 ENCOUNTER — HOSPITAL ENCOUNTER (OUTPATIENT)
Dept: RADIOLOGY | Facility: HOSPITAL | Age: 69
Discharge: HOME | End: 2024-06-13
Attending: NEUROLOGICAL SURGERY
Payer: MEDICARE

## 2024-06-13 VITALS — HEART RATE: 83 BPM | SYSTOLIC BLOOD PRESSURE: 107 MMHG | DIASTOLIC BLOOD PRESSURE: 72 MMHG

## 2024-06-13 DIAGNOSIS — G40.919 REFRACTORY EPILEPSY: ICD-10-CM

## 2024-06-13 DIAGNOSIS — Q28.2 ARTERIOVENOUS MALFORMATION OF CEREBRAL VESSELS: ICD-10-CM

## 2024-06-13 DIAGNOSIS — Q28.2 ARTERIOVENOUS MALFORMATION OF CEREBRAL VESSELS: Primary | ICD-10-CM

## 2024-06-13 PROCEDURE — 25500020 PHARM REV CODE 255: Performed by: NEUROLOGICAL SURGERY

## 2024-06-13 PROCEDURE — 99213 OFFICE O/P EST LOW 20 MIN: CPT | Mod: PBBFAC,25 | Performed by: NEUROLOGICAL SURGERY

## 2024-06-13 PROCEDURE — 70546 MR ANGIOGRAPH HEAD W/O&W/DYE: CPT | Mod: TC

## 2024-06-13 PROCEDURE — 99999 PR PBB SHADOW E&M-EST. PATIENT-LVL III: CPT | Mod: PBBFAC,,, | Performed by: NEUROLOGICAL SURGERY

## 2024-06-13 PROCEDURE — A9585 GADOBUTROL INJECTION: HCPCS | Performed by: NEUROLOGICAL SURGERY

## 2024-06-13 PROCEDURE — 99205 OFFICE O/P NEW HI 60 MIN: CPT | Mod: S$PBB,,, | Performed by: NEUROLOGICAL SURGERY

## 2024-06-13 PROCEDURE — 70546 MR ANGIOGRAPH HEAD W/O&W/DYE: CPT | Mod: 26,,, | Performed by: RADIOLOGY

## 2024-06-13 RX ORDER — GADOBUTROL 604.72 MG/ML
8 INJECTION INTRAVENOUS
Status: COMPLETED | OUTPATIENT
Start: 2024-06-13 | End: 2024-06-13

## 2024-06-13 RX ADMIN — GADOBUTROL 8 ML: 604.72 INJECTION INTRAVENOUS at 07:06
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
## 2024-06-15 ENCOUNTER — NURSE TRIAGE (OUTPATIENT)
Dept: ADMINISTRATIVE | Facility: CLINIC | Age: 69
End: 2024-06-15
Payer: MEDICARE

## 2024-06-16 NOTE — TELEPHONE ENCOUNTER
Pt had testing done earlier this week and is seeing her results on my [facility] portal. She is concerned about the amount of urine she is putting out after having contrast with the testing. Pt had had low output today despite taking diuretic and increasing water intake. Pt is audibly short of breath and sounds very anxious during the call. Care advice is go to ER now. Pt verbalized understanding.   Reason for Disposition   Patient sounds very sick or weak to the triager    Additional Information   Lab result questions   Negative: Shock suspected (e.g., cold/pale/clammy skin, too weak to stand, low BP, rapid pulse)   Negative: Sounds like a life-threatening emergency to the triager   Negative: [1] Unable to urinate (or only a few drops) > 4 hours AND [2] bladder feels very full (e.g., palpable bladder or strong urge to urinate)   Negative: [1] Decreased urination and [2] drinking very little AND [2] dehydration suspected (e.g., dark urine, no urine > 12 hours, very dry mouth, very lightheaded)    Protocols used: Information Only Call - No Triage-A-, Urinary Symptoms-A-

## 2024-06-18 ENCOUNTER — TELEPHONE (OUTPATIENT)
Dept: NEUROLOGY | Facility: CLINIC | Age: 69
End: 2024-06-18
Payer: MEDICARE

## 2024-06-18 DIAGNOSIS — G40.919 REFRACTORY EPILEPSY: Primary | ICD-10-CM

## 2024-06-18 NOTE — TELEPHONE ENCOUNTER
Orders placed to check ASM levels and CBC/CMP.     Emliy Scruggs PA-C   Neurology-Epilepsy  [facility]    ----- Message from Libby Simms MA sent at 6/18/2024  1:37 PM CDT -----  Regarding: FW: PT IS CALLING REGARDING TO SEE OF STAFF IS INTRESTED IN GETTING CLOBAZEM LEVELS  Contact: pt  Please Advise.  Can you put in orders to check patients clobazam levels?  ----- Message -----  From: Arnoldo Chahal  Sent: 6/18/2024   1:32 PM CDT  To: Franco Griffith Staff  Subject: PT IS CALLING REGARDING TO SEE OF STAFF IS I#    Pt is on her way to get a BMP done.. Pt was not able to get a lab order for her Clobazem level    Pt believe that her kidneys are failing at the moment. Pt is not able to urinate at the moment.   Pt wanted to inform staff the she did catch a seizure on yesterday.   Confirmed contact info below:  Contact Name: Haydee Linda  Phone Number: [phone]

## 2024-06-19 ENCOUNTER — PATIENT MESSAGE (OUTPATIENT)
Dept: NEUROLOGY | Facility: CLINIC | Age: 69
End: 2024-06-19
Payer: MEDICARE

## 2024-06-25 ENCOUNTER — OFFICE VISIT (OUTPATIENT)
Dept: NEUROSURGERY | Facility: CLINIC | Age: 69
End: 2024-06-25
Payer: MEDICARE

## 2024-06-25 ENCOUNTER — OFFICE VISIT (OUTPATIENT)
Dept: INTERNAL MEDICINE | Facility: CLINIC | Age: 69
End: 2024-06-25
Payer: MEDICARE

## 2024-06-25 VITALS
WEIGHT: 167 LBS | RESPIRATION RATE: 16 BRPM | HEART RATE: 73 BPM | HEIGHT: 68 IN | DIASTOLIC BLOOD PRESSURE: 78 MMHG | BODY MASS INDEX: 25.31 KG/M2 | SYSTOLIC BLOOD PRESSURE: 115 MMHG

## 2024-06-25 DIAGNOSIS — G40.919 REFRACTORY EPILEPSY: ICD-10-CM

## 2024-06-25 DIAGNOSIS — I50.9 CONGESTIVE HEART FAILURE, UNSPECIFIED HF CHRONICITY, UNSPECIFIED HEART FAILURE TYPE: ICD-10-CM

## 2024-06-25 DIAGNOSIS — Q28.2 AVM (ARTERIOVENOUS MALFORMATION) BRAIN: Primary | ICD-10-CM

## 2024-06-25 DIAGNOSIS — N28.9 RENAL IMPAIRMENT: ICD-10-CM

## 2024-06-25 DIAGNOSIS — Z01.818 PREOP EXAMINATION: Primary | ICD-10-CM

## 2024-06-25 DIAGNOSIS — F43.23 ADJUSTMENT DISORDER WITH MIXED ANXIETY AND DEPRESSED MOOD: ICD-10-CM

## 2024-06-25 DIAGNOSIS — D64.9 ANEMIA, UNSPECIFIED TYPE: ICD-10-CM

## 2024-06-25 DIAGNOSIS — I10 PRIMARY HYPERTENSION: ICD-10-CM

## 2024-06-25 DIAGNOSIS — Z86.79 HISTORY OF SPONTANEOUS INTRAPARENCHYMAL INTRACRANIAL HEMORRHAGE: ICD-10-CM

## 2024-06-25 DIAGNOSIS — F06.70 MILD NEUROCOGNITIVE DISORDER DUE TO ANOTHER MEDICAL CONDITION: ICD-10-CM

## 2024-06-25 DIAGNOSIS — M54.9 BACK PAIN, UNSPECIFIED BACK LOCATION, UNSPECIFIED BACK PAIN LATERALITY, UNSPECIFIED CHRONICITY: ICD-10-CM

## 2024-06-25 DIAGNOSIS — E87.1 HYPONATREMIA: ICD-10-CM

## 2024-06-25 DIAGNOSIS — R07.89 OTHER CHEST PAIN: ICD-10-CM

## 2024-06-25 DIAGNOSIS — I48.92 ATRIAL FLUTTER, UNSPECIFIED TYPE: ICD-10-CM

## 2024-06-25 DIAGNOSIS — K59.00 CONSTIPATION, UNSPECIFIED CONSTIPATION TYPE: ICD-10-CM

## 2024-06-25 DIAGNOSIS — R60.9 EDEMA, UNSPECIFIED TYPE: ICD-10-CM

## 2024-06-25 DIAGNOSIS — G40.919 REFRACTORY EPILEPSY: Primary | ICD-10-CM

## 2024-06-25 DIAGNOSIS — Q28.2 ARTERIOVENOUS MALFORMATION OF CEREBRAL VESSELS: ICD-10-CM

## 2024-06-25 DIAGNOSIS — R79.89 ELEVATED TSH: ICD-10-CM

## 2024-06-25 PROCEDURE — 99999 PR PBB SHADOW E&M-EST. PATIENT-LVL II: CPT | Mod: PBBFAC,,, | Performed by: NEUROLOGICAL SURGERY

## 2024-06-25 PROCEDURE — 99211 OFF/OP EST MAY X REQ PHY/QHP: CPT | Mod: PBBFAC,27 | Performed by: NEUROLOGICAL SURGERY

## 2024-06-25 PROCEDURE — 99205 OFFICE O/P NEW HI 60 MIN: CPT | Mod: S$PBB,,, | Performed by: HOSPITALIST

## 2024-06-25 PROCEDURE — 99999 PR PBB SHADOW E&M-EST. PATIENT-LVL III: CPT | Mod: PBBFAC,,, | Performed by: HOSPITALIST

## 2024-06-25 PROCEDURE — 99214 OFFICE O/P EST MOD 30 MIN: CPT | Mod: S$PBB,,, | Performed by: NEUROLOGICAL SURGERY

## 2024-06-25 PROCEDURE — 99213 OFFICE O/P EST LOW 20 MIN: CPT | Mod: PBBFAC,27 | Performed by: HOSPITALIST

## 2024-06-25 PROCEDURE — 99212 OFFICE O/P EST SF 10 MIN: CPT | Mod: PBBFAC | Performed by: NEUROLOGICAL SURGERY

## 2024-06-25 PROCEDURE — 99999 PR PBB SHADOW E&M-EST. PATIENT-LVL I: CPT | Mod: PBBFAC,,, | Performed by: NEUROLOGICAL SURGERY

## 2024-06-25 RX ORDER — MULTIVITAMIN
1 TABLET ORAL DAILY
COMMUNITY

## 2024-06-25 RX ORDER — DOCUSATE SODIUM 100 MG/1
100 CAPSULE, LIQUID FILLED ORAL 2 TIMES DAILY
COMMUNITY

## 2024-06-25 NOTE — ASSESSMENT & PLAN NOTE
Plan for surgery and to her understanding her epilepsy is affecting her cardiac function and she plans on addressing the epilepsy as soon as able to so that she does not have any further heart problems
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
## 2024-06-25 NOTE — OUTPATIENT SUBJECTIVE & OBJECTIVE
Outpatient Subjective & Objective     Chief complaint-Preoperative evaluation, Perioperative Medical management, complication reduction plan     Active cardiac conditions- none    Revised cardiac risk index predictors- history of heart failure    Functional capacity -Examples of physical activity  can take a flight of stairs holding on to the railing----- She can undertake all the above activities without  chest pain,chest tightness, Shortness of breath ,dizziness,lightheadedness making her exercise tolerance more,   than 4 Mets.   Winded on exertion, attributes to deconditioning - not heart , lung reasons    Review of Systems   Constitutional:  Negative for chills and fever.        No unusual weight changes     HENT:          STOPBANG score  / 8        Elevated BP  Age over 50        Eyes:         No new visual changes   Respiratory:          No cough , phlegm    No Hemoptysis   Cardiovascular:         As noted   Gastrointestinal:         No overt GI/ blood losses  Bowel movements-constipated  She  has uterus  She had menopause at age 50 Y   Endocrine:        Prednisone use > 20 mg daily for 3 weeks- None   Genitourinary:  Negative for dysuria.        No urinary hesitancy    Musculoskeletal:         As above      Skin:  Negative for rash.   Hematological:         Current use of Anticoagulants  None  Aspirin use for preventive reasons - holding 1 month     Psychiatric/Behavioral:            No SI/HI   No vascular stenting     No past medical history pertinent negatives.  No family history on file.    Review of Medicine tests    EKG- I had independently reviewed the EKG from--3/22/2024  It was reported to be showing     Normal sinus rhythm   Left anterior fascicular block   Abnormal ECG   When compared with ECG of 20-MAR-2024 07:40,   No significant change was found     Review of clinical lab tests:  Lab Results   Component Value Date    CREATININE 1.2 06/11/2024    HGB 13.0 04/19/2024     04/19/2024  "        Review of old records- Was done and information gathered regards to events leading to surgery and health conditions of significance in the perioperative period       No anesthesia, bleeding, cardiac problems, PONV with previous surgeries/procedures.  Medications and Allergies reviewed in epic.   FH- No anesthesia,bleeding / venous thrombosis , in family      Physical Exam  Blood pressure 115/78, pulse 73, resp. rate 16, height 5' 8" (1.727 m), weight 75.8 kg (167 lb).    I offered a sheet and the presence of a chaperone during physical examination   She was comfortable to proceed with the exam without the the presence of a chaperone        Physical Exam  Constitutional- Vitals - Body mass index is 25.39 kg/m².,   Vitals:    06/25/24 1059   BP: 115/78   Pulse: 73   Resp: 16   Oxygen saturation from yesterday was 98%  General appearance-Conscious,Coherent  Eyes- No conjunctival icterus,pupils  round  and reactive to light   ENT-Oral cavity- moist    , Hearing grossly normal   Neck- No thyromegaly ,Trachea -central, No jugular venous distension,   No Carotid Bruit   Cardiovascular -Heart Sounds- Normal  and  no murmur   , No gallop rhythm   Respiratory - Normal Respiratory Effort, Normal breath sounds,  no wheeze , and  no forced expiratory wheeze    Peripheral pitting pedal edema-- mild, no calf pain   Gastrointestinal -Soft abdomen, No palpable masses, Non Tender,Liver,Spleen not palpable. No-- free fluid and shifting dullness  Musculoskeletal- No finger Clubbing. Strength grossly normal   Lymphatic-No Palpable cervical, axillary,Inguinal lymphadenopathy   Psychiatric - normal effect,Orientation  Rt Dorsalis pedis pulses-palpable    Lt Dorsalis pedis pulses- palpable   Rt Posterior tibial pulses -palpable   Left posterior tibial pulses -palpable   Miscellaneous -  no renal bruit   Investigations  Lab and Imaging have been reviewed in epic.      Review of old records- Was done and information gathered regards " to events leading to surgery and health conditions of significance in the perioperative period.    Outpatient Subjective & Objective

## 2024-06-25 NOTE — ASSESSMENT & PLAN NOTE
Memory impairment  Not known to have dementia  Since 2022 she feels that she has cognition problems and not being able to perform her activities   she is considering retiring

## 2024-06-25 NOTE — ASSESSMENT & PLAN NOTE
History of A flutter for the past 2- 3 months, and PVCs--  Not on anticoagulation  Seizure related ?      Thrombo-embolic event within the last 3 months- none     - Restoration of normal sinus rhythm from atrial fibrillation or atrial flutter, either by cardioversion or ablation, within the last month- none     - Pulmonary vein isolation within the last 2 months- none     - Left atrial appendage thrombus within the last 3 months- none .

## 2024-06-25 NOTE — PROGRESS NOTES
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  Patient was been seen and evaluated by the epilepsy team and my partner Dr. Patel who last saw the patient June 13, 2024.  Please refer to her note for we seizure history.  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  Patient was developed intractable seizures after this event and appears to have feels medical therapy was fairly least 3 prior medications and currently still having breakthrough seizures on 3 anticonvulsants.  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    Patient was family he is very interested in surgical intervention for seizures.  If the epilepsy team feels the surgical intervention is appropriate then I think patient was likely the effect from a combined AVM resection in right temporal seizure operation.      Review of patient's allergies indicates:   Allergen Reactions    Opioids - morphine analogues Hallucinations       Current Outpatient Medications   Medication Sig Dispense Refill    cloBAZam (ONFI) 20 mg Tab Take 2 tablets (40 mg total) by mouth every evening. 60 tablet 5    levETIRAcetam (KEPPRA) 1000 MG tablet Take 2 tablets (2,000 mg total) by mouth 2 (two) times daily. 360 tablet 3    losartan (COZAAR) 50 MG tablet Take 50 mg by mouth.      magnesium gluconate 27.5 mg magne- sium (500 mg) Tab Take 500 mg by mouth.      midazolam (NAYZILAM) 5 mg/spray (0.1 mL) Spry 1 spray by Nasal route every 10 (ten) minutes as needed (cluster seizures). 1 spray for  seizures more than 3min or more than 3 seizures in 24 hours. May give second dose after 10 min if seizures persist. 2 each 5    NIFEdipine (PROCARDIA-XL) 30 MG (OSM) 24 hr tablet Take 30 mg by mouth 2 (two) times a day.      OXcarbazepine (TRILEPTAL) 300 MG Tab Take 1 tablet (300 mg total) by mouth 2 (two) times daily. 180 tablet 3    potassium chloride 10% (KAYCIEL) 20 mEq/15 mL oral solution Take 40 mEq by mouth 2 (two) times daily.      torsemide (DEMADEX) 20 MG Tab Take 20 mg by mouth once daily.      furosemide (LASIX) 40 MG tablet Take 80 mg by mouth as needed (heart failure).       No current facility-administered medications for this visit.       Past Medical History:   Diagnosis Date    CHF (congestive heart failure)     Coronary artery disease     Hypertension     Seizures     Stroke      Past Surgical History:   Procedure Laterality Date    BREAST SURGERY      cyst removal of neck      TONSILLECTOMY       Family History    None       Social History     Socioeconomic History    Marital status:    Tobacco Use    Smoking status: Former     Types: Cigarettes    Smokeless tobacco: Never   Substance and Sexual Activity    Alcohol use: Not Currently    Drug use: Never     Social Determinants of Health     Financial Resource Strain: Low Risk  (6/18/2024)    Overall Financial Resource Strain (CARDIA)     Difficulty of Paying Living Expenses: Not hard at all   Food Insecurity: No Food Insecurity (6/18/2024)    Hunger Vital Sign     Worried About Running Out of Food in the Last Year: Never true     Ran Out of Food in the Last Year: Never true   Transportation Needs: No Transportation Needs (3/20/2024)    PRAPARE - Transportation     Lack of Transportation (Medical): No     Lack of Transportation (Non-Medical): No   Physical Activity: Inactive (6/18/2024)    Exercise Vital Sign     Days of Exercise per Week: 0 days     Minutes of Exercise per Session: 0 min   Stress: Stress Concern Present (6/18/2024)     [facility] [place] [facility] Occupational [facility] - Occupational Stress Questionnaire     Feeling of Stress : To some extent   Housing Stability: Low Risk  (3/20/2024)    Housing Stability Vital Sign     Unable to Pay for Housing in the Last Year: No     Number of Places Lived in the Last Year: 1     Unstable Housing in the Last Year: No       Review of Systems    OBJECTIVE:     Vital Signs  Pain Score:   8  There is no height or weight on file to calculate BMI.    Neurosurgery Physical Exam      Diagnostic Results:  EXAM:     Cerebral angiogram     CPT CODES:     Internal carotid artery: 36224 x 2     Vertebral artery: 36226 x 2     External carotid artery: 36227 x 2     3D angio with reconstruction on separate workstation: [id number]     Closure device placement:      CLINICAL HISTORY AND INDICATION:     68 y.o. female with medically refractory epilepsy who presents for DSA to rule out the possible vascular malformation in the right temporal lobe.     PROCEDURE COMMENT:     Informed consent was obtained from the patient prior to the examination. A timeout was performed.     Sedation: Moderate conscious sedation was provided and the patient was monitored by an independent radiology nurse. Sedation time was 57 minutes.     The right groin was prepared using standard sterile technique and a right common femoral artery puncture was performed with a micropuncture needle. A 5F sheath was placed and connected to saline flush.  Through this sheath a 5F Burt catheter was advanced over a wire and used to perform selective angiography of the following vessels: Right common, internal & external carotid arteries, left common, internal & external carotid arteries.  Bilateral vertebral arteries.  Also, 3D rotational angiogram with reconstruction was performed by staff physician who viewed the study on a separate workstation to provide detail architecture of the cerebral blood vessels and the aneurysm.     FINDINGS:     The right common  carotid artery was selected and an angiogram was performed. Angiogram demonstrates excellent flow the bifurcation without significant plaque buildup or stenosis.     The right internal carotid artery was selected and an angiogram was performed. Angiogram demonstrates good flow into the intracranial segments with opacification of both the anterior and middle cerebral arteries.  Early venous filling was demonstrated from an arteriovenous malformation the right lateral temporal lobe.  The 3D reconstruction demonstrated predominant arterial feeders arising from a sylvian branch of the right middle cerebral artery.  No evidence of nidal aneurysm.  The AVM nidus size is about 7 x 9 mm.  Early venous drainage of the AVM is via a single superficial cortical vein draining to the superior sagittal sinus.  No deep venous drainage pattern was noted.  There are no concerns of venous stenosis/ectasia/aneurysm.  The remainder of right middle cerebral territory is unremarkable.     The right external carotid artery was selected and an angiogram was performed. Angiogram demonstrates excellent flow with opacification of all the branches of the artery without any significant abnormalities.     The right vertebral artery was selected and an angiogram was performed. Angiogram demonstrates excellent flow into the intracranial segments with opacification of all the branches and the vertebrobasilar junction with competitive flow from the left vertebral artery.  No aneurysms or arteriovenous malformations were identified during the injection.     The left common carotid artery was selected and an angiogram was performed. Angiogram demonstrates excellent flow at the bifurcation without significant plaque buildup or stenosis.     The left internal carotid artery was selected and an angiogram was performed. Angiogram demonstrates excellent flow into the intracranial segments with opacification of both the anterior and middle cerebral arteries.   There are no aneurysms or arteriovenous malformation is identified.  There is no significant stenosis.     The left external carotid artery was selected and an angiogram was performed. Angiogram demonstrates excellent flow with opacification of all the branches of the artery without any significant abnormalities.     The left vertebral artery was selected and an angiogram was performed. Angiogram demonstrates excellent flow into the intracranial segments with opacification of all the branches of the vertebral artery as well as the basilar artery.  There is competitive flow from the right vertebral artery.  There are no aneurysms or arteriovenous malformations identified.  There is no significant stenosis.     The venous drainage pattern were within normal limits.     The catheter was removed and an angiogram was performed through the femoral sheath demonstrating an access site appropriate for closure device.     Hemostasis was obtained in the right groin using the 5 F Mynx device.     The total contrast dose for the procedure was: 80 cc of Visipaque.     Radiation dose:     Radiation DAP 25399 [id number]     Reference air kerma was 2397 mGy     Fluoro time was 18.5 minutes     Physicians in the procedure: Bill Huitron MD     Fellow (s) in the procedure: Moises Bonds MD, [facility]     Images and report were permanently recorded.     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  There one main MCA feeder in the sylvian fissure.  Venous drainage is superficial through parietal cortical vein emptying into superior sagittal sinus.  There is no evidence of aneurysm.     Otherwise normal cerebral angiogram.    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  My assumption is that patient bled from the AVM  used to go developed post hemorrhagic seizures temporal lobe which evolved into involve the mesial temporal surgery.  Given the patient was age and location of the AVM if we were treating the AVM by cell then I think radiosurgery is a viable option however in the setting of with central right temporal lobectomy then I think the it was best to be handled more operation.  Patient in  also did not like the fact that radiosurgery would have to wait 1-2 years before the AVM obliterates.  From epilepsy perspective patient was not a candidate for stereo EEG for we would stripped placement in my opinion.  I think there was good localization this would be lesional case.  I am happy to coordinate with the epilepsy team about a combined right frontotemporal craniotomy with excision of AVM in right temporal lobectomy for seizure control.  Patient cardiac history with need cardiac clearance.  Also with the discuss with the Interventional team about possibly pre-op kd embolization.    We will coordinate everything after patient see Dr. Patel today        Note dictated with voice recognition software, please excuse any grammatical errors.

## 2024-06-25 NOTE — ASSESSMENT & PLAN NOTE
As per her understanding, her ejection fraction was 45%    No SOB on laying flat     Patient has history of heart failure that seems  compensated . I suggest continuation of the maintenance Diuretic regimen while monitoring the renal function and electrolytes  in the perioperative period . Please keep a record of the Input and Out put and weigh patient daily so that fluid overload can be detected and acted upon promptly . I suggest providing low  sodium diet   I suggest avoidance of the ordering of continuous IV fluids and if IV fluids are required ,to order for a specific duration of time and consider ordering lower IV fluid rate

## 2024-06-25 NOTE — PROGRESS NOTES
[facility]  Progress Note    Patient Name: Haydee Linda  MRN: [id number]  Date of Evaluation- 06/25/2024  PCP- Tamy, Primary Doctor        HPI:  History of present illness- I had the pleasure of meeting this pleasant 68 y.o. lady in the pre op clinic prior to her elective  Neuro  surgery. The patient is new to me . Ms Singleton was accompanied by  Mr Figueroa Buck.    I have obtained the history by speaking to the patient and by reviewing the electronic health records.    Events leading up to surgery / History of presenting illness -    AVM -right lateral temporal lobe  Intractable epilepsy     Epilepsy started 2002 , 2 weeks after coming after a comma- Had Rt temporal bleeding ( ? AVM related ) . Was taking Imitrex for migraines  She was driving at  that time   She was hospitalized in ICU for 7 days-her intracranial bleeding was addressed non surgically  She had no recurrence of intracranial bleeding  Epilepsy started in 2002 in the form of a grand mal seizure treated with Dilantin  She was on Dilantin for a long time between 2002 and 2012  Keppra was added and 2 later Dilantin was stopped   Was on Keppra since 2010  She was doing well with seizures until August of 2022 when she collapsed on the bathroom floor  Had elevated BP, heart palpitations  She had a heart attack in 2022 and at the same time she was diagnosed with heart failure and to her understanding as a time her ejection fraction was 42%    Her seizures increased since 2022  Keppra dose was increased and added Xcopri-she had good control of seizures at that time but she was very sleepy from that combination  Her Keppra was with increased   Her Xcopri    Her current seizure treatment include Keppra, clobazam and Trileptal  She seems to have had fairly reasonable control of seizures on this regimen up until 2 weeks ago when she had to increase the clobazam dosing    She is on clobazam 40 mg dose which seems to be helping    She was  doing well from a heart standpoint lawn longest of 2022 when she was felt to have had a heart attack, heart failure  As per the history obtained, she has had no coronary artery disease on heart catheterization  She has systolic and diastolic heart failure and is on maintained on diuretic medication  She initially tried Lasix but later changed to Demadex and potassium supplementation    She was referred to an epilepsy surgeon and during evaluation was found to have an AV malformation  To her understanding, she has a mesial temporal sclerosis    He is planning on addressing the epilepsy surgery and AVM in combination    She has frontal headaches 8/10 severity for 3 weeks  She also has ringing in her ears  Headaches better on seizure medication    She is not known to have an year problems her and her ringing in the years has been increasing      Relevant health conditions of significance for the perioperative period/ History of presenting illness -    Health conditions of significance for the perioperative period      Intracranial hemorrhage in 2002  Seizure - Intractable epilepsy   HTN  Back pain  Heart failure under-Dr. Live in cardiology at 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   Pets- None  Children - 2 daughters - 34 Y, 29 Y  Pregnancies - 2  C sections - None  Has help post op      Could not complete the evaluation when she was in the office hence why did a virtual evaluation to complete    Virtual pre op evaluation   Audio only   Location of the patient [place]  Consent obtained for virtual evaluation     Each patient to whom  he or she provides medical services by telemedicine is:  (1) informed of the relationship between the physician and patient and the respective role of any other health care provider with respect to management of the patient; and (2) notified that he or she may decline to receive medical services by telemedicine and may withdraw from such care at any time.    Chief complaint-Preoperative evaluation , Perioperative Medical management, complication reduction plan               Subjective/ Objective:     Chief complaint-Preoperative evaluation, Perioperative Medical management, complication reduction plan     Active cardiac conditions- none    Revised cardiac risk index predictors- history of heart failure    Functional capacity -Examples of physical activity  can take a flight of stairs holding on to the railing----- She can undertake all the above activities without  chest pain,chest tightness, Shortness of breath ,dizziness,lightheadedness making her exercise tolerance more,   than 4 Mets.   Winded on exertion, attributes to deconditioning - not heart , lung reasons    Review of Systems   Constitutional:  Negative for chills and fever.        No unusual weight changes     HENT:          STOPBANG score  / 8        Elevated BP  Age over 50        Eyes:         No new visual changes   Respiratory:          No cough , phlegm    No Hemoptysis   Cardiovascular:         As noted   Gastrointestinal:         No overt GI/ blood losses  Bowel movements-constipated  She  has uterus  She had menopause at age 50 Y   Endocrine:        Prednisone use > 20 mg daily for 3 weeks- None   Genitourinary:  Negative for dysuria.        No urinary hesitancy    Musculoskeletal:         As above      Skin:  Negative for rash.   Hematological:         Current use of Anticoagulants  None  Aspirin use for preventive reasons - holding 1 month     Psychiatric/Behavioral:            No SI/HI   No vascular stenting     No past medical history  "pertinent negatives.  No family history on file.    Review of Medicine tests    EKG- I had independently reviewed the EKG from--3/22/2024  It was reported to be showing     Normal sinus rhythm   Left anterior fascicular block   Abnormal ECG   When compared with ECG of 20-MAR-2024 07:40,   No significant change was found     Review of clinical lab tests:  Lab Results   Component Value Date    CREATININE 1.2 06/11/2024    HGB 13.0 04/19/2024     04/19/2024         Review of old records- Was done and information gathered regards to events leading to surgery and health conditions of significance in the perioperative period       No anesthesia, bleeding, cardiac problems, PONV with previous surgeries/procedures.  Medications and Allergies reviewed in epic.   FH- No anesthesia,bleeding / venous thrombosis , in family      Physical Exam  Blood pressure 115/78, pulse 73, resp. rate 16, height 5' 8" (1.727 m), weight 75.8 kg (167 lb).    I offered a sheet and the presence of a chaperone during physical examination   She was comfortable to proceed with the exam without the the presence of a chaperone        Physical Exam  Constitutional- Vitals - Body mass index is 25.39 kg/m².,   Vitals:    06/25/24 1059   BP: 115/78   Pulse: 73   Resp: 16   Oxygen saturation from yesterday was 98%  General appearance-Conscious,Coherent  Eyes- No conjunctival icterus,pupils  round  and reactive to light   ENT-Oral cavity- moist    , Hearing grossly normal   Neck- No thyromegaly ,Trachea -central, No jugular venous distension,   No Carotid Bruit   Cardiovascular -Heart Sounds- Normal  and  no murmur   , No gallop rhythm   Respiratory - Normal Respiratory Effort, Normal breath sounds,  no wheeze , and  no forced expiratory wheeze    Peripheral pitting pedal edema-- mild, no calf pain   Gastrointestinal -Soft abdomen, No palpable masses, Non Tender,Liver,Spleen not palpable. No-- free fluid and shifting dullness  Musculoskeletal- No " finger Clubbing. Strength grossly normal   Lymphatic-No Palpable cervical, axillary,Inguinal lymphadenopathy   Psychiatric - normal effect,Orientation  Rt Dorsalis pedis pulses-palpable    Lt Dorsalis pedis pulses- palpable   Rt Posterior tibial pulses -palpable   Left posterior tibial pulses -palpable   Miscellaneous -  no renal bruit   Investigations  Lab and Imaging have been reviewed in epic.      Review of old records- Was done and information gathered regards to events leading to surgery and health conditions of significance in the perioperative period.        Preoperative cardiac risk assessment-  The patient does not have any active cardiac conditions . Revised cardiac risk index predictors- -1--.Functional capacity is more than 4 Mets. She will be undergoing a Neuro procedure that carries a Moderate Risk risk     Risk of a major Cardiac event ( Defined as death, myocardial infarction, or cardiac arrest at 30 days after noncardiac surgery), based on RCRI score     -6.0%     No further cardiac work up is indicated prior to proceeding with the surgery          American Society of Anesthesiologists Physical status classification ( ASA ) class: 3     Postoperative pulmonary complication risk assessment:      ARISCAT ( [place]et) risk index- risk class -  Low, if duration of surgery is under 3 hours, intermediate, if duration of surgery is over 3 hours          Assessment/Plan:     History of spontaneous intraparenchymal intracranial hemorrhage  2002  No recurrence    Mild neurocognitive disorder due to another medical condition  Memory impairment  Not known to have dementia  Since 2022 she feels that she has cognition problems and not being able to perform her activities   she is considering retiring      Refractory epilepsy  Plan for surgery and to her understanding her epilepsy is affecting her cardiac function and she plans on addressing the epilepsy as soon as able to so that she does not have any further heart  problems    Adjustment disorder with mixed anxiety and depressed mood  She does not feel that she has anxiety or depression    Doing good from a mental health stand point   Not under psychiatry , Therapist care   Has supportive family   Not On Medication that is helping   No Suicidal / Homicidal ideation      Edema    Not known to have   Varicose veins   Excessive Salted food   Long standing NSAID use     Not known to have     Liver failure   Kidney failure     Edema- I suggested avoidance of added salt,avoidance of NSAID's, unless advised or ordered  and suggested Limb elevation and jeannie hose use     Congestive heart failure  As per her understanding, her ejection fraction was 45%    No SOB on laying flat     Patient has history of heart failure that seems  compensated . I suggest continuation of the maintenance Diuretic regimen while monitoring the renal function and electrolytes  in the perioperative period . Please keep a record of the Input and Out put and weigh patient daily so that fluid overload can be detected and acted upon promptly . I suggest providing low  sodium diet   I suggest avoidance of the ordering of continuous IV fluids and if IV fluids are required ,to order for a specific duration of time and consider ordering lower IV fluid rate      Other chest pain  Has occasional chest pain, occasional heart palpitations     Central -  tightening - under Left breast   Did not require NTG  Resting helps   No relation with burping, breathing     Attributed to spasms       Constipation  Suggested working on bowel movements in preparation for surgery   Constipation- I suggest giving laxative regimen as opioid use,reduced ambulation  can increase the constipation      As per her TSH has increased over time -still within the Reference range        Primary hypertension    Losartan. Nifedipine    Home BP readings -120-130/60-70  I suggest continuation of ---Nifedipine ----- during the entire perioperative period.  I suggest holding -----Losartan--- on the morning of the surgery and can continue that  post operatively under blood pressure, electrolyte and renal function monitoring as long as they are acceptable.I suggest addressing pain control as uncontrolled pain can increased blood pressure      Back pain  Low back pain   Not taking oral medication   No  urinary incontinence     Atrial flutter  History of A flutter for the past 2- 3 months, and PVCs--  Not on anticoagulation  Seizure related ?      Thrombo-embolic event within the last 3 months- none     - Restoration of normal sinus rhythm from atrial fibrillation or atrial flutter, either by cardioversion or ablation, within the last month- none     - Pulmonary vein isolation within the last 2 months- none     - Left atrial appendage thrombus within the last 3 months- none .      Renal impairment  Had angiogram 6/11 with contrast   Had MRI with contrast 6/13   Had reduced urine input   6/11 1.2 - base line creatinine about 0.8  Noted creatinine elevation compared to baseline even before contrast  Could be Diuretic , aleve related  Was taking Aleve for a long time     Creatinine and GFR back to usual     Stages of CKD discussed  Deleterious effects NSAID's , Beneficial effects of Hydration discussed   Tylenol ( If not intolerant ) as needed for pain     I  suggest monitoring renal function, in put and out put status kd-operatively. I  suggest avoiding nephrotoxic medication including NSAIDs, COX2 inhibitors, intravenous contrast agent,avoiding hypotension to prevent further renal impairment.   Care with intravenous contrast discussed         Preventive perioperative care    Thromboembolic prophylaxis:  Her risk factors for thrombosis include surgical procedure and age.I suggest  thromboembolic prophylaxis ( mechanical/pharmacological, weighing the risk benefits of pharmacological agent use considering kd procedural bleeding )  during the perioperative period.I  suggested being active in the post operative period.      Postoperative pulmonary complication prophylaxis-Risk factors for post operative pulmonary complications include age over 65 years and ASA class >2- I suggest incentive spirometry use, early ambulation, and end tidal carbon dioxide monitoring  , oral care , head end of bed elevation      Renal complication prophylaxis-Risk factors for renal complications include hypertension . I suggest keeping her optimally hydrated  in the perioperative period.  On Fluid restriction of 2 litre's a day       Surgical site Infection Prophylaxis-I  suggest appropriate antibiotic for Prophylaxis against Surgical site infections  No reported Staph infection  Skin antibacterial discussed         This visit was focused on Preoperative evaluation, Perioperative Medical management, complication reduction plans. I suggest that the patient follows up with primary care or relevant sub specialists for ongoing health care.    I appreciate the opportunity to be involved in this patients care. Please feel free to contact me if there were any questions about this consultation.    Patient is optimized    Patient/ care giver/ Family member was instructed to call and update me about any changes to health,  medication, office visits ,testing out side of the [facility] operative care center , hospitalizations between now and surgery      Keena Quiroz MD  Internal Medicine  [facility]   Cell Phone- [phone]    History of COVID - no   COVID vaccination status -Yes    COVID screening     No fever   No cough   No sore throat   No loss of taste or smell   No muscle aches   No nausea, vomiting , diarrhea   --  I have spent --160---- minutes of time which includes, time spent to prepare to see the patient , obtaining history ,performing examination, counseling/Educating the patient , Documenting clinical information in the record      She indicated that her Cardiologist is in  acceptance for her to proceed     .[email or web address]  --  6/26/2024- 1816                  Hemoglobin mildly low   TSH mildly elevated   Sodium low  Creatinine mildly elevated  Alk-phos mildly elevated  --  6/28/2024- 14 45     Virtual pre op evaluation   Audio only   Location of the patient- Home  Consent obtained for virtual evaluation     Each patient to whom he or she provides medical services by telemedicine is:  (1) informed of the relationship between the physician and patient and the respective role of any other health care provider with respect to management of the patient; and (2) notified that he or she may decline to receive medical services by telemedicine and may withdraw from such care at any time.    Chief complaint-Preoperative evaluation , Perioperative Medical management, complication reduction plan   --  Discussed Anemia likely anti-inflammatory medication related  Hemoglobin mildly low likely from Aleve    Taking Aleve - 3-4 times a week for 3-4 years for aches  Likely cause of the mildly low hemoglobin is longstanding anti-inflammatory medication use    TSH-No overt hypothyroid symptoms     Sodium-2000 Ml a day- no dark urine   Not known to have adrenal problems  Likely cause of mildly low sodium as Trileptal intake  Has not taken anti-inflammatory medication in 2 weeks' time    Creatinine elevation could be from anti-inflammatory medication use    Alkaline phosphatase elevation 2023, 2024    No recent bony injury   She had a few seizure related falls that could be accounting for elevated alkaline phosphatase    No colon problems, no gallbladder problem or pain in the right upper quadrant of the abdomen    Not known to have myeloma    Requested sending Cardiology records to me     Called to follow up , spoke to her to address any concerns with the up coming surgery or any questions on Medication instructions -  Doing well

## 2024-06-25 NOTE — ASSESSMENT & PLAN NOTE
Not known to have   Varicose veins   Excessive Salted food   Long standing NSAID use     Not known to have     Liver failure   Kidney failure     Edema- I suggested avoidance of added salt,avoidance of NSAID's, unless advised or ordered  and suggested Limb elevation and jeannie hose use

## 2024-06-25 NOTE — ASSESSMENT & PLAN NOTE
She does not feel that she has anxiety or depression    Doing good from a mental health stand point   Not under psychiatry , Therapist care   Has supportive family   Not On Medication that is helping   No Suicidal / Homicidal ideation

## 2024-06-25 NOTE — ASSESSMENT & PLAN NOTE
Had angiogram 6/11 with contrast   Had MRI with contrast 6/13   Had reduced urine input   6/11 1.2 - base line creatinine about 0.8  Noted creatinine elevation compared to baseline even before contrast  Could be Diuretic , aleve related  Was taking Aleve for a long time     Creatinine and GFR back to usual     Stages of CKD discussed  Deleterious effects NSAID's , Beneficial effects of Hydration discussed   Tylenol ( If not intolerant ) as needed for pain     I  suggest monitoring renal function, in put and out put status kd-operatively. I  suggest avoiding nephrotoxic medication including NSAIDs, COX2 inhibitors, intravenous contrast agent,avoiding hypotension to prevent further renal impairment.   Care with intravenous contrast discussed
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
## 2024-06-25 NOTE — ASSESSMENT & PLAN NOTE
Suggested working on bowel movements in preparation for surgery   Constipation- I suggest giving laxative regimen as opioid use,reduced ambulation  can increase the constipation      As per her TSH has increased over time -still within the Reference range

## 2024-06-25 NOTE — ASSESSMENT & PLAN NOTE
Has occasional chest pain, occasional heart palpitations     Central -  tightening - under Left breast   Did not require NTG  Resting helps   No relation with burping, breathing     Attributed to spasms

## 2024-06-28 ENCOUNTER — TELEPHONE (OUTPATIENT)
Dept: INTERNAL MEDICINE | Facility: CLINIC | Age: 69
End: 2024-06-28
Payer: MEDICARE

## 2024-06-28 PROBLEM — D64.9 ANEMIA: Status: ACTIVE | Noted: 2024-06-28

## 2024-06-28 PROBLEM — E87.1 HYPONATREMIA: Status: ACTIVE | Noted: 2024-06-28

## 2024-06-28 PROBLEM — R79.89 ELEVATED TSH: Status: ACTIVE | Noted: 2024-06-28

## 2024-06-28 NOTE — HPI
Virtual pre op evaluation   Audio only   Location of the patient- Home  Consent obtained for virtual evaluation     Each patient to whom he or she provides medical services by telemedicine is:  (1) informed of the relationship between the physician and patient and the respective role of any other health care provider with respect to management of the patient; and (2) notified that he or she may decline to receive medical services by telemedicine and may withdraw from such care at any time.      --  Discussed Anemia likely anti-inflammatory medication related  Hemoglobin mildly low likely from Aleve    Taking Aleve - 3-4 times a week for 3-4 years for aches  Off a Aleve for about 2 weeks' time  Likely cause of the mildly low hemoglobin is longstanding anti-inflammatory medication use    TSH-No overt hypothyroid symptoms     Sodium-2000 Ml a day- no dark urine   Not known to have adrenal problems  No chronic steroid use  Likely cause of mildly low sodium is Trileptal intake      Creatinine elevation could be from anti-inflammatory medication use    Alkaline phosphatase elevation 2023, 2024    No recent bony injury   She had a few seizure related falls that could be accounting for elevated alkaline phosphatase    No colon problems, no gallbladder problem or pain in the right upper quadrant of the abdomen    Not known to have myeloma    Requested sending Cardiology records to me     Called to follow up , spoke to her to address any concerns with the up coming surgery or any questions on Medication instructions -  Doing well

## 2024-06-28 NOTE — PROGRESS NOTES
[facility]  Progress Note    Patient Name: Haydee Linda  MRN: [id number]  Date of Evaluation- 06/28/2024  PCP- No, Primary Doctor        HPI:  Virtual pre op evaluation   Audio only   Location of the patient- Home  Consent obtained for virtual evaluation     Each patient to whom he or she provides medical services by telemedicine is:  (1) informed of the relationship between the physician and patient and the respective role of any other health care provider with respect to management of the patient; and (2) notified that he or she may decline to receive medical services by telemedicine and may withdraw from such care at any time.      --  Discussed Anemia likely anti-inflammatory medication related  Hemoglobin mildly low likely from Aleve    Taking Aleve - 3-4 times a week for 3-4 years for aches  Off a Aleve for about 2 weeks' time  Likely cause of the mildly low hemoglobin is longstanding anti-inflammatory medication use    TSH-No overt hypothyroid symptoms     Sodium-2000 Ml a day- no dark urine   Not known to have adrenal problems  No chronic steroid use  Likely cause of mildly low sodium is Trileptal intake      Creatinine elevation could be from anti-inflammatory medication use    Alkaline phosphatase elevation 2023, 2024    No recent bony injury   She had a few seizure related falls that could be accounting for elevated alkaline phosphatase    No colon problems, no gallbladder problem or pain in the right upper quadrant of the abdomen    Not known to have myeloma    Requested sending Cardiology records to me     Called to follow up , spoke to her to address any concerns with the up coming surgery or any questions on Medication instructions -  Doing well       Will aim to get her ready for surgery for July 10th

## 2024-06-28 NOTE — ASSESSMENT & PLAN NOTE
No overt GI/ blood losses  No stomach upset/ ulcer   Father Colon cancer at 47 Y   Gets colonoscopy    Taking Aleve - 3-4 times a week for 3-4 years for aches  Likely cause of the mildly low hemoglobin is longstanding anti-inflammatory medication use  Discussed follow up

## 2024-07-01 DIAGNOSIS — G40.919 REFRACTORY EPILEPSY: Primary | ICD-10-CM

## 2024-07-01 DIAGNOSIS — Q28.2 ARTERIOVENOUS MALFORMATION OF CEREBRAL VESSELS: ICD-10-CM

## 2024-07-02 ENCOUNTER — TELEPHONE (OUTPATIENT)
Dept: PREADMISSION TESTING | Facility: HOSPITAL | Age: 69
End: 2024-07-02
Payer: MEDICARE

## 2024-07-02 ENCOUNTER — TELEPHONE (OUTPATIENT)
Dept: INTERNAL MEDICINE | Facility: CLINIC | Age: 69
End: 2024-07-02
Payer: MEDICARE

## 2024-07-02 DIAGNOSIS — M79.609 PAIN IN EXTREMITY, UNSPECIFIED EXTREMITY: ICD-10-CM

## 2024-07-02 DIAGNOSIS — Z01.818 PREOPERATIVE TESTING: Primary | ICD-10-CM

## 2024-07-02 DIAGNOSIS — Q27.30 AVM (ARTERIOVENOUS MALFORMATION): ICD-10-CM

## 2024-07-02 NOTE — TELEPHONE ENCOUNTER
Spoke to patient yesterday , she insist on faxing ALL her cardiology notes and surgery clearance which she says she received from Dr. Live. I gave her the direct fax # and she'll have her assistant send them over .

## 2024-07-02 NOTE — TELEPHONE ENCOUNTER
----- Message from Alexandra Gracia sent at 7/2/2024 11:58 AM CDT -----  Contact: [phone]  Would like to receive medical advice.    Pt called and stated that she need a clearance for upcoming surgery and trying to send over information from Cardiology.      Would they like a call back or a response via [facility]:  call    Additional information:  Please call to advise

## 2024-07-05 ENCOUNTER — TELEPHONE (OUTPATIENT)
Dept: NEUROSURGERY | Facility: CLINIC | Age: 69
End: 2024-07-05
Payer: MEDICARE

## 2024-07-05 DIAGNOSIS — Q28.2 AVM (ARTERIOVENOUS MALFORMATION) BRAIN: Primary | ICD-10-CM

## 2024-07-05 DIAGNOSIS — Q28.2 ARTERIOVENOUS MALFORMATION OF CEREBRAL VESSELS: ICD-10-CM

## 2024-07-05 NOTE — TELEPHONE ENCOUNTER
Called to inform patient of arrival time for next week. Pt scheduled for 8A surgery. [facility].

## 2024-07-05 NOTE — PATIENT INSTRUCTIONS
Please use the Bactroban ointment twice daily in your nose for 5 days leading up to surgery. (You may use a Q-tip to apply a little bit of ointment inside each nostril.)    Please use the Hibiclens soap every other day in the shower for the 5 days before your surgery. For example, if surgery is on Monday, use the Hibiclens when you shower on Thursday, Saturday, and Monday morning.     We are asking you to do this to help reduce the chance of having an infection associated with surgery. Thank you!     Please do not take any blood thinners for at least a week prior to surgery

## 2024-07-08 ENCOUNTER — TELEPHONE (OUTPATIENT)
Dept: INTERVENTIONAL RADIOLOGY/VASCULAR | Facility: HOSPITAL | Age: 69
End: 2024-07-08
Payer: MEDICARE

## 2024-07-08 ENCOUNTER — DOCUMENTATION ONLY (OUTPATIENT)
Dept: PREADMISSION TESTING | Facility: HOSPITAL | Age: 69
End: 2024-07-08
Payer: MEDICARE

## 2024-07-08 ENCOUNTER — PATIENT MESSAGE (OUTPATIENT)
Dept: NEUROLOGY | Facility: CLINIC | Age: 69
End: 2024-07-08
Payer: MEDICARE

## 2024-07-08 DIAGNOSIS — Q28.2 AVM (ARTERIOVENOUS MALFORMATION) BRAIN: Primary | ICD-10-CM

## 2024-07-08 NOTE — PRE-PROCEDURE INSTRUCTIONS
PRE-OP INSTRUCTIONS:  Instructed patient to have no food,milk or milk products after midnight   It is ok to take AM medications with a few sips of water   Medication instructions for pm prior to and am of surgery reviewed.  Instructed patient to avoid taking vitamins,supplements or ibuprofen the am of surgery.  Shower instructions provided    Patient denies any side effects or issues with anesthesia or sedation.

## 2024-07-08 NOTE — NURSING
Spoke to patient arrival time confirmed for IR procedure.  Patient to arrive 7/9/24 @ 10am Short Stay Cardiac unit.  Patient repeated arrival time back and verbalized understanding.  States she [place] @ [place] and is aware she will be admitted after IR procedure tomorrow.

## 2024-07-09 ENCOUNTER — ANESTHESIA EVENT (OUTPATIENT)
Dept: SURGERY | Facility: HOSPITAL | Age: 69
End: 2024-07-09
Payer: MEDICARE

## 2024-07-09 ENCOUNTER — HOSPITAL ENCOUNTER (INPATIENT)
Dept: INTERVENTIONAL RADIOLOGY/VASCULAR | Facility: HOSPITAL | Age: 69
LOS: 8 days | Discharge: HOME | DRG: 025 | End: 2024-07-17
Attending: STUDENT IN AN ORGANIZED HEALTH CARE EDUCATION/TRAINING PROGRAM | Admitting: STUDENT IN AN ORGANIZED HEALTH CARE EDUCATION/TRAINING PROGRAM
Payer: MEDICARE

## 2024-07-09 ENCOUNTER — ANESTHESIA (OUTPATIENT)
Dept: INTERVENTIONAL RADIOLOGY/VASCULAR | Facility: HOSPITAL | Age: 69
End: 2024-07-09
Payer: MEDICARE

## 2024-07-09 DIAGNOSIS — Q27.30 ARTERIOVENOUS MALFORMATION (AVM): ICD-10-CM

## 2024-07-09 DIAGNOSIS — I50.9 CHF (CONGESTIVE HEART FAILURE): ICD-10-CM

## 2024-07-09 DIAGNOSIS — R07.9 CHEST PAIN: ICD-10-CM

## 2024-07-09 DIAGNOSIS — Q27.30 AVM (ARTERIOVENOUS MALFORMATION): ICD-10-CM

## 2024-07-09 DIAGNOSIS — Q28.2 AVM (ARTERIOVENOUS MALFORMATION) BRAIN: Primary | ICD-10-CM

## 2024-07-09 LAB
ABO + RH BLD: NORMAL
ALBUMIN SERPL BCP-MCNC: 4 G/DL (ref 3.5–5.2)
ALP SERPL-CCNC: 99 U/L (ref 55–135)
ALT SERPL W/O P-5'-P-CCNC: 21 U/L (ref 10–44)
ANION GAP SERPL CALC-SCNC: 7 MMOL/L (ref 8–16)
ANION GAP SERPL CALC-SCNC: 9 MMOL/L (ref 8–16)
AST SERPL-CCNC: 19 U/L (ref 10–40)
BASOPHILS # BLD AUTO: 0.04 K/UL (ref 0–0.2)
BASOPHILS NFR BLD: 0.9 % (ref 0–1.9)
BILIRUB SERPL-MCNC: 0.4 MG/DL (ref 0.1–1)
BLD GP AB SCN CELLS X3 SERPL QL: NORMAL
BUN SERPL-MCNC: 17 MG/DL (ref 8–23)
BUN SERPL-MCNC: 22 MG/DL (ref 8–23)
CALCIUM SERPL-MCNC: 10.1 MG/DL (ref 8.7–10.5)
CALCIUM SERPL-MCNC: 9.1 MG/DL (ref 8.7–10.5)
CHLORIDE SERPL-SCNC: 103 MMOL/L (ref 95–110)
CHLORIDE SERPL-SCNC: 97 MMOL/L (ref 95–110)
CO2 SERPL-SCNC: 24 MMOL/L (ref 23–29)
CO2 SERPL-SCNC: 24 MMOL/L (ref 23–29)
CREAT SERPL-MCNC: 0.8 MG/DL (ref 0.5–1.4)
CREAT SERPL-MCNC: 0.9 MG/DL (ref 0.5–1.4)
DIFFERENTIAL METHOD BLD: ABNORMAL
EOSINOPHIL # BLD AUTO: 0.1 K/UL (ref 0–0.5)
EOSINOPHIL NFR BLD: 2.2 % (ref 0–8)
ERYTHROCYTE [DISTWIDTH] IN BLOOD BY AUTOMATED COUNT: 12.7 % (ref 11.5–14.5)
EST. GFR  (NO RACE VARIABLE): >60 ML/MIN/1.73 M^2
EST. GFR  (NO RACE VARIABLE): >60 ML/MIN/1.73 M^2
GLUCOSE SERPL-MCNC: 116 MG/DL (ref 70–110)
GLUCOSE SERPL-MCNC: 92 MG/DL (ref 70–110)
HCT VFR BLD AUTO: 32.2 % (ref 37–48.5)
HGB BLD-MCNC: 11.1 G/DL (ref 12–16)
IMM GRANULOCYTES # BLD AUTO: 0.01 K/UL (ref 0–0.04)
IMM GRANULOCYTES NFR BLD AUTO: 0.2 % (ref 0–0.5)
INR PPP: 0.9 (ref 0.8–1.2)
LYMPHOCYTES # BLD AUTO: 1.7 K/UL (ref 1–4.8)
LYMPHOCYTES NFR BLD: 36.7 % (ref 18–48)
MAGNESIUM SERPL-MCNC: 2 MG/DL (ref 1.6–2.6)
MCH RBC QN AUTO: 30.5 PG (ref 27–31)
MCHC RBC AUTO-ENTMCNC: 34.5 G/DL (ref 32–36)
MCV RBC AUTO: 89 FL (ref 82–98)
MONOCYTES # BLD AUTO: 0.5 K/UL (ref 0.3–1)
MONOCYTES NFR BLD: 10.6 % (ref 4–15)
NEUTROPHILS # BLD AUTO: 2.3 K/UL (ref 1.8–7.7)
NEUTROPHILS NFR BLD: 49.4 % (ref 38–73)
NRBC BLD-RTO: 0 /100 WBC
PHOSPHATE SERPL-MCNC: 3.3 MG/DL (ref 2.7–4.5)
PLATELET # BLD AUTO: 255 K/UL (ref 150–450)
PMV BLD AUTO: 9.6 FL (ref 9.2–12.9)
POCT GLUCOSE: 110 MG/DL (ref 70–110)
POTASSIUM SERPL-SCNC: 3.8 MMOL/L (ref 3.5–5.1)
POTASSIUM SERPL-SCNC: 5.1 MMOL/L (ref 3.5–5.1)
PROT SERPL-MCNC: 6.9 G/DL (ref 6–8.4)
PROTHROMBIN TIME: 10.3 SEC (ref 9–12.5)
RBC # BLD AUTO: 3.64 M/UL (ref 4–5.4)
SODIUM SERPL-SCNC: 130 MMOL/L (ref 136–145)
SODIUM SERPL-SCNC: 134 MMOL/L (ref 136–145)
SPECIMEN OUTDATE: NORMAL
TROPONIN I SERPL DL<=0.01 NG/ML-MCNC: <0.006 NG/ML (ref 0–0.03)
WBC # BLD AUTO: 4.63 K/UL (ref 3.9–12.7)

## 2024-07-09 PROCEDURE — 63600175 PHARM REV CODE 636 W HCPCS: Performed by: NURSE ANESTHETIST, CERTIFIED REGISTERED

## 2024-07-09 PROCEDURE — 84484 ASSAY OF TROPONIN QUANT: CPT | Performed by: NURSE PRACTITIONER

## 2024-07-09 PROCEDURE — 76377 3D RENDER W/INTRP POSTPROCES: CPT | Mod: 26,,, | Performed by: RADIOLOGY

## 2024-07-09 PROCEDURE — 75898 FOLLOW-UP ANGIOGRAPHY: CPT | Mod: TC | Performed by: RADIOLOGY

## 2024-07-09 PROCEDURE — 25000003 PHARM REV CODE 250: Performed by: NURSE ANESTHETIST, CERTIFIED REGISTERED

## 2024-07-09 PROCEDURE — 37000009 HC ANESTHESIA EA ADD 15 MINS

## 2024-07-09 PROCEDURE — 75894 X-RAYS TRANSCATH THERAPY: CPT | Mod: TC | Performed by: RADIOLOGY

## 2024-07-09 PROCEDURE — 25500020 PHARM REV CODE 255: Performed by: RADIOLOGY

## 2024-07-09 PROCEDURE — 61624 TCAT PERM OCCLS/EMBOLJ CNS: CPT | Performed by: RADIOLOGY

## 2024-07-09 PROCEDURE — B3131ZZ FLUOROSCOPY OF RIGHT COMMON CAROTID ARTERY USING LOW OSMOLAR CONTRAST: ICD-10-PCS | Performed by: RADIOLOGY

## 2024-07-09 PROCEDURE — 76377 3D RENDER W/INTRP POSTPROCES: CPT | Mod: TC | Performed by: RADIOLOGY

## 2024-07-09 PROCEDURE — 75894 X-RAYS TRANSCATH THERAPY: CPT | Mod: TC

## 2024-07-09 PROCEDURE — 99291 CRITICAL CARE FIRST HOUR: CPT | Mod: FS,,, | Performed by: STUDENT IN AN ORGANIZED HEALTH CARE EDUCATION/TRAINING PROGRAM

## 2024-07-09 PROCEDURE — 27201319 IR EMBOLIZATION (CNS) INTRACRANIAL

## 2024-07-09 PROCEDURE — 80053 COMPREHEN METABOLIC PANEL: CPT | Performed by: NURSE PRACTITIONER

## 2024-07-09 PROCEDURE — B3161ZZ FLUOROSCOPY OF RIGHT INTERNAL CAROTID ARTERY USING LOW OSMOLAR CONTRAST: ICD-10-PCS | Performed by: RADIOLOGY

## 2024-07-09 PROCEDURE — 36415 COLL VENOUS BLD VENIPUNCTURE: CPT | Performed by: STUDENT IN AN ORGANIZED HEALTH CARE EDUCATION/TRAINING PROGRAM

## 2024-07-09 PROCEDURE — 84100 ASSAY OF PHOSPHORUS: CPT | Performed by: NURSE PRACTITIONER

## 2024-07-09 PROCEDURE — 36224 PLACE CATH CAROTD ART: CPT | Mod: 50,,, | Performed by: RADIOLOGY

## 2024-07-09 PROCEDURE — 85610 PROTHROMBIN TIME: CPT | Performed by: STUDENT IN AN ORGANIZED HEALTH CARE EDUCATION/TRAINING PROGRAM

## 2024-07-09 PROCEDURE — 36224 PLACE CATH CAROTD ART: CPT | Mod: 50 | Performed by: RADIOLOGY

## 2024-07-09 PROCEDURE — 20000000 HC ICU ROOM

## 2024-07-09 PROCEDURE — 86901 BLOOD TYPING SEROLOGIC RH(D): CPT | Performed by: STUDENT IN AN ORGANIZED HEALTH CARE EDUCATION/TRAINING PROGRAM

## 2024-07-09 PROCEDURE — 80048 BASIC METABOLIC PNL TOTAL CA: CPT | Performed by: STUDENT IN AN ORGANIZED HEALTH CARE EDUCATION/TRAINING PROGRAM

## 2024-07-09 PROCEDURE — 86850 RBC ANTIBODY SCREEN: CPT | Performed by: STUDENT IN AN ORGANIZED HEALTH CARE EDUCATION/TRAINING PROGRAM

## 2024-07-09 PROCEDURE — C1769 GUIDE WIRE: HCPCS

## 2024-07-09 PROCEDURE — G0269 OCCLUSIVE DEVICE IN VEIN ART: HCPCS | Performed by: RADIOLOGY

## 2024-07-09 PROCEDURE — C1894 INTRO/SHEATH, NON-LASER: HCPCS

## 2024-07-09 PROCEDURE — 25000003 PHARM REV CODE 250: Performed by: NURSE PRACTITIONER

## 2024-07-09 PROCEDURE — 83735 ASSAY OF MAGNESIUM: CPT | Performed by: NURSE PRACTITIONER

## 2024-07-09 PROCEDURE — 37000008 HC ANESTHESIA 1ST 15 MINUTES

## 2024-07-09 PROCEDURE — 99499 UNLISTED E&M SERVICE: CPT | Mod: ICN,,, | Performed by: NURSE PRACTITIONER

## 2024-07-09 PROCEDURE — 75894 X-RAYS TRANSCATH THERAPY: CPT | Mod: 26,,, | Performed by: RADIOLOGY

## 2024-07-09 PROCEDURE — 03LG3DZ OCCLUSION OF INTRACRANIAL ARTERY WITH INTRALUMINAL DEVICE, PERCUTANEOUS APPROACH: ICD-10-PCS | Performed by: RADIOLOGY

## 2024-07-09 PROCEDURE — 85025 COMPLETE CBC W/AUTO DIFF WBC: CPT | Performed by: STUDENT IN AN ORGANIZED HEALTH CARE EDUCATION/TRAINING PROGRAM

## 2024-07-09 PROCEDURE — 94761 N-INVAS EAR/PLS OXIMETRY MLT: CPT

## 2024-07-09 PROCEDURE — 75898 FOLLOW-UP ANGIOGRAPHY: CPT | Mod: 26,,, | Performed by: RADIOLOGY

## 2024-07-09 PROCEDURE — 86900 BLOOD TYPING SEROLOGIC ABO: CPT | Performed by: STUDENT IN AN ORGANIZED HEALTH CARE EDUCATION/TRAINING PROGRAM

## 2024-07-09 RX ORDER — IBUPROFEN 200 MG
24 TABLET ORAL
Status: DISCONTINUED | OUTPATIENT
Start: 2024-07-09 | End: 2024-07-17 | Stop reason: HOSPADM

## 2024-07-09 RX ORDER — CLOBAZAM 10 MG/1
20 TABLET ORAL NIGHTLY
Status: DISCONTINUED | OUTPATIENT
Start: 2024-07-09 | End: 2024-07-09

## 2024-07-09 RX ORDER — LIDOCAINE HYDROCHLORIDE 10 MG/ML
1 INJECTION, SOLUTION EPIDURAL; INFILTRATION; INTRACAUDAL; PERINEURAL ONCE
Status: DISCONTINUED | OUTPATIENT
Start: 2024-07-09 | End: 2024-07-09

## 2024-07-09 RX ORDER — IODIXANOL 320 MG/ML
120 INJECTION, SOLUTION INTRAVASCULAR
Status: COMPLETED | OUTPATIENT
Start: 2024-07-09 | End: 2024-07-09

## 2024-07-09 RX ORDER — IBUPROFEN 200 MG
16 TABLET ORAL
Status: DISCONTINUED | OUTPATIENT
Start: 2024-07-09 | End: 2024-07-10

## 2024-07-09 RX ORDER — IBUPROFEN 200 MG
16 TABLET ORAL
Status: DISCONTINUED | OUTPATIENT
Start: 2024-07-09 | End: 2024-07-17 | Stop reason: HOSPADM

## 2024-07-09 RX ORDER — DEXAMETHASONE SODIUM PHOSPHATE 4 MG/ML
INJECTION, SOLUTION INTRA-ARTICULAR; INTRALESIONAL; INTRAMUSCULAR; INTRAVENOUS; SOFT TISSUE
Status: DISCONTINUED | OUTPATIENT
Start: 2024-07-09 | End: 2024-07-09

## 2024-07-09 RX ORDER — CLOBAZAM 10 MG/1
40 TABLET ORAL NIGHTLY
Status: DISCONTINUED | OUTPATIENT
Start: 2024-07-09 | End: 2024-07-17 | Stop reason: HOSPADM

## 2024-07-09 RX ORDER — AMOXICILLIN 250 MG
1 CAPSULE ORAL 2 TIMES DAILY
Status: DISCONTINUED | OUTPATIENT
Start: 2024-07-09 | End: 2024-07-15

## 2024-07-09 RX ORDER — PROPOFOL 10 MG/ML
VIAL (ML) INTRAVENOUS
Status: DISCONTINUED | OUTPATIENT
Start: 2024-07-09 | End: 2024-07-09

## 2024-07-09 RX ORDER — EPHEDRINE SULFATE 50 MG/ML
INJECTION, SOLUTION INTRAVENOUS
Status: DISCONTINUED | OUTPATIENT
Start: 2024-07-09 | End: 2024-07-09

## 2024-07-09 RX ORDER — LIDOCAINE HYDROCHLORIDE 20 MG/ML
INJECTION, SOLUTION EPIDURAL; INFILTRATION; INTRACAUDAL; PERINEURAL
Status: DISCONTINUED | OUTPATIENT
Start: 2024-07-09 | End: 2024-07-09

## 2024-07-09 RX ORDER — ACETAMINOPHEN 325 MG/1
650 TABLET ORAL EVERY 6 HOURS PRN
Status: DISCONTINUED | OUTPATIENT
Start: 2024-07-10 | End: 2024-07-12

## 2024-07-09 RX ORDER — SODIUM,POTASSIUM PHOSPHATES 280-250MG
2 POWDER IN PACKET (EA) ORAL
Status: DISCONTINUED | OUTPATIENT
Start: 2024-07-09 | End: 2024-07-17 | Stop reason: HOSPADM

## 2024-07-09 RX ORDER — LEVETIRACETAM 500 MG/1
1000 TABLET ORAL 2 TIMES DAILY
Status: DISCONTINUED | OUTPATIENT
Start: 2024-07-09 | End: 2024-07-09

## 2024-07-09 RX ORDER — SODIUM CHLORIDE 9 MG/ML
INJECTION, SOLUTION INTRAVENOUS CONTINUOUS
Status: DISCONTINUED | OUTPATIENT
Start: 2024-07-09 | End: 2024-07-09

## 2024-07-09 RX ORDER — ONDANSETRON HYDROCHLORIDE 2 MG/ML
INJECTION, SOLUTION INTRAVENOUS
Status: DISCONTINUED | OUTPATIENT
Start: 2024-07-09 | End: 2024-07-09

## 2024-07-09 RX ORDER — SODIUM CHLORIDE 0.9 % (FLUSH) 0.9 %
10 SYRINGE (ML) INJECTION
Status: DISCONTINUED | OUTPATIENT
Start: 2024-07-09 | End: 2024-07-17 | Stop reason: HOSPADM

## 2024-07-09 RX ORDER — LANOLIN ALCOHOL/MO/W.PET/CERES
800 CREAM (GRAM) TOPICAL
Status: DISCONTINUED | OUTPATIENT
Start: 2024-07-09 | End: 2024-07-17 | Stop reason: HOSPADM

## 2024-07-09 RX ORDER — BACITRACIN 500 [USP'U]/G
OINTMENT TOPICAL 2 TIMES DAILY
COMMUNITY

## 2024-07-09 RX ORDER — NIFEDIPINE 30 MG/1
30 TABLET, EXTENDED RELEASE ORAL 2 TIMES DAILY
Status: DISCONTINUED | OUTPATIENT
Start: 2024-07-09 | End: 2024-07-17 | Stop reason: HOSPADM

## 2024-07-09 RX ORDER — GLUCAGON 1 MG
1 KIT INJECTION
Status: DISCONTINUED | OUTPATIENT
Start: 2024-07-09 | End: 2024-07-17 | Stop reason: HOSPADM

## 2024-07-09 RX ORDER — FENTANYL CITRATE 50 UG/ML
INJECTION, SOLUTION INTRAMUSCULAR; INTRAVENOUS
Status: DISCONTINUED | OUTPATIENT
Start: 2024-07-09 | End: 2024-07-09

## 2024-07-09 RX ORDER — LOSARTAN POTASSIUM 50 MG/1
50 TABLET ORAL DAILY
Status: DISCONTINUED | OUTPATIENT
Start: 2024-07-10 | End: 2024-07-17 | Stop reason: HOSPADM

## 2024-07-09 RX ORDER — HEPARIN SODIUM 1000 [USP'U]/ML
INJECTION, SOLUTION INTRAVENOUS; SUBCUTANEOUS
Status: DISCONTINUED | OUTPATIENT
Start: 2024-07-09 | End: 2024-07-09

## 2024-07-09 RX ORDER — OXCARBAZEPINE 300 MG/1
300 TABLET, FILM COATED ORAL 2 TIMES DAILY
Status: DISCONTINUED | OUTPATIENT
Start: 2024-07-09 | End: 2024-07-17 | Stop reason: HOSPADM

## 2024-07-09 RX ORDER — ROCURONIUM BROMIDE 10 MG/ML
INJECTION, SOLUTION INTRAVENOUS
Status: DISCONTINUED | OUTPATIENT
Start: 2024-07-09 | End: 2024-07-09

## 2024-07-09 RX ADMIN — HEPARIN SODIUM 2000 UNITS: 1000 INJECTION, SOLUTION INTRAVENOUS; SUBCUTANEOUS at 03:07

## 2024-07-09 RX ADMIN — IODIXANOL 120 ML: 320 INJECTION, SOLUTION INTRAVASCULAR at 04:07

## 2024-07-09 RX ADMIN — EPHEDRINE SULFATE 10 MG: 50 INJECTION INTRAVENOUS at 02:07

## 2024-07-09 RX ADMIN — EPHEDRINE SULFATE 20 MG: 50 INJECTION INTRAVENOUS at 03:07

## 2024-07-09 RX ADMIN — SUGAMMADEX 200 MG: 100 INJECTION, SOLUTION INTRAVENOUS at 04:07

## 2024-07-09 RX ADMIN — IOHEXOL 75 ML: 350 INJECTION, SOLUTION INTRAVENOUS at 10:07

## 2024-07-09 RX ADMIN — SENNOSIDES AND DOCUSATE SODIUM 1 TABLET: 50; 8.6 TABLET ORAL at 09:07

## 2024-07-09 RX ADMIN — ACETAMINOPHEN 650 MG: 325 TABLET ORAL at 11:07

## 2024-07-09 RX ADMIN — SODIUM CHLORIDE: 0.9 INJECTION, SOLUTION INTRAVENOUS at 01:07

## 2024-07-09 RX ADMIN — CLOBAZAM 40 MG: 10 TABLET ORAL at 09:07

## 2024-07-09 RX ADMIN — ROCURONIUM BROMIDE 50 MG: 10 INJECTION INTRAVENOUS at 02:07

## 2024-07-09 RX ADMIN — ONDANSETRON 4 MG: 2 INJECTION INTRAMUSCULAR; INTRAVENOUS at 04:07

## 2024-07-09 RX ADMIN — LEVETIRACETAM 2000 MG: 750 TABLET, FILM COATED ORAL at 09:07

## 2024-07-09 RX ADMIN — OXCARBAZEPINE 300 MG: 300 TABLET, FILM COATED ORAL at 09:07

## 2024-07-09 RX ADMIN — LIDOCAINE HYDROCHLORIDE 1 MG: 20 INJECTION, SOLUTION EPIDURAL; INFILTRATION; INTRACAUDAL; PERINEURAL at 02:07

## 2024-07-09 RX ADMIN — FENTANYL CITRATE 100 MCG: 50 INJECTION, SOLUTION INTRAMUSCULAR; INTRAVENOUS at 02:07

## 2024-07-09 RX ADMIN — DEXAMETHASONE SODIUM PHOSPHATE 4 MG: 4 INJECTION, SOLUTION INTRAMUSCULAR; INTRAVENOUS at 04:07

## 2024-07-09 RX ADMIN — NIFEDIPINE 30 MG: 30 TABLET, FILM COATED, EXTENDED RELEASE ORAL at 09:07

## 2024-07-09 RX ADMIN — PROPOFOL 200 MG: 10 INJECTION, EMULSION INTRAVENOUS at 02:07

## 2024-07-09 NOTE — ASSESSMENT & PLAN NOTE
Resume oxcarbazepine 300 mg BID  Clobazam 40 mg QHS  Levetiracetam 2000 BID  Seizure precautions

## 2024-07-09 NOTE — ANESTHESIA PREPROCEDURE EVALUATION
[facility]  Anesthesia Pre-Operative Evaluation         Patient Name: Haydee Linda  YOB: 1955  MRN: [id number]    SUBJECTIVE:     Pre-operative evaluation for Procedure(s) (LRB):  **AVM**CRANIOTOMY, USING FRAMELESS STEREOTAXY BRAIN LAB (Right)     07/09/2024    Haydee Linda is a 69 y.o. female w/ a significant PMHx of R lateral lobe AVM, seizures, HTN, HF (EF 45-55%), hx Aflutter. Consent signed with patient and .    Patient now presents for the above procedure(s).    Prev airway: None documented.      Patient Active Problem List   Diagnosis    Secondary seizure disorder    Congestive heart failure    History of spontaneous intraparenchymal intracranial hemorrhage    Other chest pain    Localization-related (focal) (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, intractable, without status epilepticus    Refractory epilepsy    Adjustment disorder with mixed anxiety and depressed mood    Mild neurocognitive disorder due to another medical condition    Edema    Constipation    Primary hypertension    Back pain    Atrial flutter    Renal impairment    Anemia    Elevated TSH    Hyponatremia    AVM (arteriovenous malformation) brain       Review of patient's allergies indicates:   Allergen Reactions    Opioids - morphine analogues Hallucinations     Can tolerate Codeine, Dilaudid, oxycodone, hydrocodone       Current Inpatient Medications:      No current facility-administered medications on file prior to encounter.     Current Outpatient Medications on File Prior to Encounter   Medication Sig Dispense Refill    acetaminophen (TYLENOL) 500 MG tablet Take 1,000 mg by mouth daily as needed for Pain.      cloBAZam (ONFI) 20 mg Tab Take 2 tablets (40 mg total) by mouth every evening. 60 tablet 5    docusate sodium (COLACE) 100 MG capsule Take 100 mg by mouth 2 (two) times daily.      levETIRAcetam (KEPPRA) 1000 MG tablet Take 2  tablets (2,000 mg total) by mouth 2 (two) times daily. 360 tablet 3    losartan (COZAAR) 50 MG tablet Take 50 mg by mouth once daily.      magnesium gluconate 27.5 mg magne- sium (500 mg) Tab Take 500 mg by mouth 2 (two) times daily.      methyl salicylate/menthol (ICY HOT TOP) Apply topically. With lidocaine      midazolam (NAYZILAM) 5 mg/spray (0.1 mL) Spry 1 spray by Nasal route every 10 (ten) minutes as needed (cluster seizures). 1 spray for seizures more than 3min or more than 3 seizures in 24 hours. May give second dose after 10 min if seizures persist. (Patient not taking: Reported on 6/25/2024) 2 each 5    multivitamin (THERAGRAN) per tablet Take 1 tablet by mouth once daily. With vegetables      NIFEdipine (PROCARDIA-XL) 30 MG (OSM) 24 hr tablet Take 30 mg by mouth 2 (two) times a day.      OXcarbazepine (TRILEPTAL) 300 MG Tab Take 1 tablet (300 mg total) by mouth 2 (two) times daily. (Patient taking differently: Take by mouth 2 (two) times daily.) 180 tablet 3    potassium chloride 10% (KAYCIEL) 20 mEq/15 mL oral solution Take 40 mEq by mouth 2 (two) times daily. She has not taking liquid potassium and is taking 40 mEq by mouth once a day in a pill form      torsemide (DEMADEX) 20 MG Tab Take 40 mg by mouth once daily.      UNABLE TO FIND medication name: SUPER BEET         Past Surgical History:   Procedure Laterality Date    BREAST SURGERY      Cyst , enhancement - 1984    cyst removal of neck      Benign- Lymphnode-    Dental, Jaw surgery- 2023      TONSILLECTOMY      Tonsillectomy and adenoidectomy at age 7 years       Social History     Socioeconomic History    Marital status:    Tobacco Use    Smoking status: Former     Types: Cigarettes    Smokeless tobacco: Never    Tobacco comments:     Quit 1999   Substance and Sexual Activity    Alcohol use: Not Currently    Drug use: Never     Social Determinants of Health     Financial Resource Strain: Low Risk  (6/18/2024)    Overall Financial  Resource Strain (CARDIA)     Difficulty of Paying Living Expenses: Not hard at all   Food Insecurity: No Food Insecurity (6/18/2024)    Hunger Vital Sign     Worried About Running Out of Food in the Last Year: Never true     Ran Out of Food in the Last Year: Never true   Transportation Needs: No Transportation Needs (3/20/2024)    PRAPARE - Transportation     Lack of Transportation (Medical): No     Lack of Transportation (Non-Medical): No   Physical Activity: Inactive (6/18/2024)    Exercise Vital Sign     Days of Exercise per Week: 0 days     Minutes of Exercise per Session: 0 min   Stress: Stress Concern Present (6/18/2024)    [employer] [employer] - Occupational Stress Questionnaire     Feeling of Stress : To some extent   Housing Stability: Low Risk  (3/20/2024)    Housing Stability Vital Sign     Unable to Pay for Housing in the Last Year: No     Number of Places Lived in the Last Year: 1     Unstable Housing in the Last Year: No       OBJECTIVE:     Vital Signs Range (Last 24H):  Temp:  [36.2 °C (97.2 °F)]   Pulse:  [61-66]   Resp:  [16-18]   BP: (113-120)/(67-83)   SpO2:  [98 %-100 %]   Arterial Line BP: (139)/(71)       Significant Labs:  Lab Results   Component Value Date    WBC 4.63 07/09/2024    HGB 11.1 (L) 07/09/2024    HCT 32.2 (L) 07/09/2024     07/09/2024    CHOL 182 08/26/2022    TRIG 89 08/26/2022    HDL 52 08/26/2022    ALT 41 04/19/2024    AST 34 04/19/2024     (L) 07/09/2024    K 5.1 07/09/2024    CL 97 07/09/2024    CREATININE 0.9 07/09/2024    BUN 22 07/09/2024    CO2 24 07/09/2024    INR 0.9 07/09/2024       Diagnostic Studies: No relevant studies.    EKG:   Results for orders placed or performed during the hospital encounter of 03/18/24   EKG 12-lead    Collection Time: 03/22/24  9:40 AM   Result Value Ref Range    QRS Duration 90 ms    [facility] QTC Calculation 442 ms    Narrative    Test Reason : R07.89,    Vent. Rate : 079 BPM     Atrial Rate : 079 BPM     P-R  Int : 190 ms          QRS Dur : 090 ms      QT Int : 386 ms       P-R-T Axes : 056 -57 053 degrees     QTc Int : 442 ms    Normal sinus rhythm  Left anterior fascicular block  Abnormal ECG  When compared with ECG of 20-MAR-2024 07:40,  No significant change was found  Confirmed by Richard FRANKEL MD ([id number]) on 3/22/2024 11:23:43 AM    Referred By: TUTU ACUÑA           Confirmed By:Richard FRANKEL MD       2D ECHO:  TTE:  No results found for this or any previous visit.    PHU:  No results found for this or any previous visit.    ASSESSMENT/PLAN:           Pre-op Assessment    I have reviewed the Patient Summary Reports.     I have reviewed the Nursing Notes. I have reviewed the NPO Status.   I have reviewed the Medications.     Review of Systems  Anesthesia Hx:  No problems with previous Anesthesia   History of prior surgery of interest to airway management or planning:          Denies Family Hx of Anesthesia complications.    Denies Personal Hx of Anesthesia complications.                    Social:  Non-Smoker, No Alcohol Use       Hematology/Oncology:       -- Denies Anemia:                  Denies Current/Recent Cancer                EENT/Dental:  EENT/Dental Normal           Cardiovascular:      Denies Hypertension.    Denies CAD.     Denies Dysrhythmias.   Denies Angina.  Denies CHF.    no hyperlipidemia     Functional Capacity unable to determine, ABLE TO CLIMB 1 FLIGHT OF STAIRS  limited by disability  Coronary Artery Disease:  hx of myocardial infarction (MI).              Congestive Heart Failure (CHF)                Hypertension  , Recent typical clinic B/P of 115/78   Disorder of Cardiac Rhythm, Atrial Flutter     Pulmonary:    Denies COPD.  Denies Asthma.  Shortness of breath (with minimal exertion per patient)  Denies Recent URI.  Denies Sleep Apnea.                Renal/:   Denies Chronic Renal Disease.                Hepatic/GI:      Denies GERD. Denies Liver Disease.            Musculoskeletal:  Denies  Arthritis.    Musculoskeletal General/Symptoms:  Functional capacity is ambulatory without assistance.            Neurological:    Denies CVA. Denies Neuromuscular Disease.   Denies Seizures.    AVM OF CEREBRAL VESSELS, H/O INTRA CRANIAL HEMORRHAGE      Denies Chronic Pain Syndrome    Seizure Disorder (LAST SEIZURE PER PATIENT  WAS A MONTH AGO AS OF 7/2), Chronic Epilepsy                          Endocrine:  Denies Diabetes.   Denies Hyperthyroidism.       Denies Obesity / BMI > 30  Psych:   denies anxiety denies depression ADJUSTMENT DISORDER WITH ANXIETY AND DEPRESSION                  Anesthesia Plan  Type of Anesthesia, risks & benefits discussed:    Anesthesia Type: Gen ETT  Intra-op Monitoring Plan: Standard ASA Monitors and Art Line  Post Op Pain Control Plan: multimodal analgesia and IV/PO Opioids PRN  Induction:  IV  Airway Plan: Direct and Video, Post-Induction  Informed Consent: Informed consent signed with the Patient and all parties understand the risks and agree with anesthesia plan.  All questions answered.   ASA Score: 3  Day of Surgery Review of History & Physical: H&P Update referred to the surgeon/provider.    Ready For Surgery From Anesthesia Perspective.     .

## 2024-07-09 NOTE — ANESTHESIA POSTPROCEDURE EVALUATION
Anesthesia Post Evaluation    Patient: Haydee Linda    Procedure(s) Performed: * No procedures listed *    Final Anesthesia Type: general      Patient location during evaluation: ICU  Patient participation: Yes- Able to Participate  Level of consciousness: awake and alert  Post-procedure vital signs: reviewed and stable  Pain management: adequate  Airway patency: patent    PONV status at discharge: No PONV  Anesthetic complications: no      Cardiovascular status: blood pressure returned to baseline  Respiratory status: unassisted  Hydration status: euvolemic  Follow-up not needed.              Vitals Value Taken Time   /59 07/09/24 1747   Temp 98 07/09/24 1749   Pulse 66 07/09/24 1748   Resp 15 07/09/24 1748   SpO2 100 % 07/09/24 1748   Vitals shown include unfiled device data.      No case tracking events are documented in the log.      Pain/Shaneka Score: No data recorded

## 2024-07-09 NOTE — PLAN OF CARE
Patient arrived to room. PIV placed, labs sent. Admit assessment completed. Plan of care discussed with patient. Baseline left side weakness noted, unsteady gait.     Obtained verbal order to draw STAT CBC, CMP, PT/INR/ and type/crossmatch per Dr Cammie MOSES in anticipation of cranial surgeon on tomorrow. MD aware of right side crackles upon assessment.     Protective pads placed to sacral and heels.

## 2024-07-09 NOTE — SUBJECTIVE & OBJECTIVE
Past Medical History:   Diagnosis Date    CHF (congestive heart failure)     Coronary artery disease     Hypertension     Seizures     ST elevation (STEMI) myocardial infarction of unspecified site     2022    Stroke     intracranial hemorrhage     Past Surgical History:   Procedure Laterality Date    BREAST SURGERY      Cyst , enhancement - 1984    cyst removal of neck      Benign- Lymphnode-    Dental, Jaw surgery- 2023      TONSILLECTOMY      Tonsillectomy and adenoidectomy at age 7 years      No current facility-administered medications on file prior to encounter.     Current Outpatient Medications on File Prior to Encounter   Medication Sig Dispense Refill    acetaminophen (TYLENOL) 500 MG tablet Take 1,000 mg by mouth daily as needed for Pain.      bacitracin 500 unit/gram ointment Apply topically 2 (two) times daily. To inside of nasal passages      cloBAZam (ONFI) 20 mg Tab Take 2 tablets (40 mg total) by mouth every evening. 60 tablet 5    docusate sodium (COLACE) 100 MG capsule Take 100 mg by mouth 2 (two) times daily.      levETIRAcetam (KEPPRA) 1000 MG tablet Take 2 tablets (2,000 mg total) by mouth 2 (two) times daily. 360 tablet 3    losartan (COZAAR) 50 MG tablet Take 50 mg by mouth once daily.      magnesium gluconate 27.5 mg magne- sium (500 mg) Tab Take 500 mg by mouth 2 (two) times daily.      methyl salicylate/menthol (ICY HOT TOP) Apply topically. With lidocaine      multivitamin (THERAGRAN) per tablet Take 1 tablet by mouth once daily. With vegetables      NIFEdipine (PROCARDIA-XL) 30 MG (OSM) 24 hr tablet Take 30 mg by mouth 2 (two) times a day.      OXcarbazepine (TRILEPTAL) 300 MG Tab Take 1 tablet (300 mg total) by mouth 2 (two) times daily. (Patient taking differently: Take by mouth 2 (two) times daily.) 180 tablet 3    potassium chloride 10% (KAYCIEL) 20 mEq/15 mL oral solution Take 40 mEq by mouth 2 (two) times daily. She has not taking liquid potassium and is taking 40 mEq by mouth  once a day in a pill form      torsemide (DEMADEX) 20 MG Tab Take 40 mg by mouth once daily.      UNABLE TO FIND medication name: SUPER BEET      midazolam (NAYZILAM) 5 mg/spray (0.1 mL) Spry 1 spray by Nasal route every 10 (ten) minutes as needed (cluster seizures). 1 spray for seizures more than 3min or more than 3 seizures in 24 hours. May give second dose after 10 min if seizures persist. (Patient not taking: Reported on 6/25/2024) 2 each 5      Allergies: Opioids - morphine analogues  Family History   Problem Relation Name Age of Onset    Alcohol abuse Brother       Social History     Tobacco Use    Smoking status: Former     Types: Cigarettes    Smokeless tobacco: Never    Tobacco comments:     Quit 1999   Substance Use Topics    Alcohol use: Not Currently    Drug use: Never     Review of Systems   Constitutional: Negative.    HENT: Negative.     Eyes: Negative.    Respiratory: Negative.     Cardiovascular: Negative.    Gastrointestinal: Negative.    Endocrine: Negative.    Genitourinary: Negative.    Musculoskeletal: Negative.    Skin: Negative.    Neurological: Negative.      Objective:     Vitals:    Temp: 97.2 °F (36.2 °C)  Pulse: 66  BP: 113/67  MAP (mmHg): 86  Resp: 17  SpO2: 100 %    Temp  Min: 97.2 °F (36.2 °C)  Max: 97.2 °F (36.2 °C)  Pulse  Min: 61  Max: 66  BP  Min: 113/67  Max: 120/69  MAP (mmHg)  Min: 85  Max: 90  Resp  Min: 16  Max: 18  SpO2  Min: 98 %  Max: 100 %    No intake/output data recorded.            Physical Exam  Vitals and nursing note reviewed.   Constitutional:       Appearance: Normal appearance.   HENT:      Head: Normocephalic.      Nose: Nose normal.      Mouth/Throat:      Mouth: Mucous membranes are moist.      Pharynx: Oropharynx is clear.   Eyes:      Pupils: Pupils are equal, round, and reactive to light.   Cardiovascular:      Rate and Rhythm: Normal rate and regular rhythm.      Pulses: Normal pulses.      Heart sounds: Normal heart sounds.   Pulmonary:      Effort:  Pulmonary effort is normal.      Breath sounds: Normal breath sounds.   Abdominal:      General: Bowel sounds are normal.      Palpations: Abdomen is soft.   Musculoskeletal:         General: Normal range of motion.   Skin:     General: Skin is warm and dry.      Capillary Refill: Capillary refill takes 2 to 3 seconds.   Neurological:      Mental Status: She is alert.      Comments: GCS 15  AAO X4  PERRL  JOEL to command  Sensation Intact X4              Today I personally reviewed pertinent medications, lines/drains/airways, imaging, cardiology results, laboratory results, microbiology results, notably:

## 2024-07-09 NOTE — ANESTHESIA PREPROCEDURE EVALUATION
[facility]  Anesthesia Pre-Operative Evaluation         Patient Name: Haydee Linda  YOB: 1955  MRN: [id number]    SUBJECTIVE:      07/09/2024    Haydee Linda is a 69 y.o. female w/ a significant PMHx of R lateral lobe AVM, seizures, HTN, HF (EF 45-55%), hx Aflutter.    Prev airway: None documented.    7/9 Per 's note: 7/8/2024- 1829    Cardiology records reviewed     Older records reviewed that showed mildly elevated PTT in August of 2022 possibly related to low molecular weight heparin use-I suggest that the perioperative care team be aware of this  With regards to mildly abnormal PTT, she has not known to have coagulation factor deficiency, lupus anticoagulant or von Willebrand's  She had procedures since with no problems     Cardiac catheterization from August 20, 2022 reportedly showed-Widely patent coronary arteries with preserved systolic function      I could not find a note of optimization from the cardiologist but from what I gather she can proceed with the surgery from a heart standpoint  To her understanding, she received a verbal approval from the cardiologist      Patient Active Problem List   Diagnosis    Secondary seizure disorder    Congestive heart failure    History of spontaneous intraparenchymal intracranial hemorrhage    Other chest pain    Localization-related (focal) (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, intractable, without status epilepticus    Refractory epilepsy    Adjustment disorder with mixed anxiety and depressed mood    Mild neurocognitive disorder due to another medical condition    Edema    Constipation    Primary hypertension    Back pain    Atrial flutter    Renal impairment    Anemia    Elevated TSH    Hyponatremia    AVM (arteriovenous malformation) brain       Review of patient's allergies indicates:   Allergen Reactions    Opioids - morphine analogues Hallucinations      Can tolerate Codeine, Dilaudid, oxycodone, hydrocodone       Current Inpatient Medications:   LIDOcaine (PF) 10 mg/ml (1%)  1 mL Other Once       Current Outpatient Medications on File Prior to Encounter   Medication Sig Dispense Refill    acetaminophen (TYLENOL) 500 MG tablet Take 1,000 mg by mouth daily as needed for Pain.      bacitracin 500 unit/gram ointment Apply topically 2 (two) times daily. To inside of nasal passages      cloBAZam (ONFI) 20 mg Tab Take 2 tablets (40 mg total) by mouth every evening. 60 tablet 5    docusate sodium (COLACE) 100 MG capsule Take 100 mg by mouth 2 (two) times daily.      levETIRAcetam (KEPPRA) 1000 MG tablet Take 2 tablets (2,000 mg total) by mouth 2 (two) times daily. 360 tablet 3    losartan (COZAAR) 50 MG tablet Take 50 mg by mouth once daily.      magnesium gluconate 27.5 mg magne- sium (500 mg) Tab Take 500 mg by mouth 2 (two) times daily.      methyl salicylate/menthol (ICY HOT TOP) Apply topically. With lidocaine      multivitamin (THERAGRAN) per tablet Take 1 tablet by mouth once daily. With vegetables      NIFEdipine (PROCARDIA-XL) 30 MG (OSM) 24 hr tablet Take 30 mg by mouth 2 (two) times a day.      OXcarbazepine (TRILEPTAL) 300 MG Tab Take 1 tablet (300 mg total) by mouth 2 (two) times daily. (Patient taking differently: Take by mouth 2 (two) times daily.) 180 tablet 3    potassium chloride 10% (KAYCIEL) 20 mEq/15 mL oral solution Take 40 mEq by mouth 2 (two) times daily. She has not taking liquid potassium and is taking 40 mEq by mouth once a day in a pill form      torsemide (DEMADEX) 20 MG Tab Take 40 mg by mouth once daily.      UNABLE TO FIND medication name: SUPER BEET      midazolam (NAYZILAM) 5 mg/spray (0.1 mL) Spry 1 spray by Nasal route every 10 (ten) minutes as needed (cluster seizures). 1 spray for seizures more than 3min or more than 3 seizures in 24 hours. May give second dose after 10 min if seizures persist. (Patient not taking: Reported on  6/25/2024) 2 each 5     No current facility-administered medications on file prior to encounter.       Past Surgical History:   Procedure Laterality Date    BREAST SURGERY      Cyst , enhancement - 1984    cyst removal of neck      Benign- Lymphnode-    Dental, Jaw surgery- 2023      TONSILLECTOMY      Tonsillectomy and adenoidectomy at age 7 years       Social History     Socioeconomic History    Marital status:    Tobacco Use    Smoking status: Former     Types: Cigarettes    Smokeless tobacco: Never    Tobacco comments:     Quit 1999   Substance and Sexual Activity    Alcohol use: Not Currently    Drug use: Never     Social Determinants of Health     Financial Resource Strain: Low Risk  (6/18/2024)    Overall Financial Resource Strain (CARDIA)     Difficulty of Paying Living Expenses: Not hard at all   Food Insecurity: No Food Insecurity (6/18/2024)    Hunger Vital Sign     Worried About Running Out of Food in the Last Year: Never true     Ran Out of Food in the Last Year: Never true   Transportation Needs: No Transportation Needs (3/20/2024)    PRAPARE - Transportation     Lack of Transportation (Medical): No     Lack of Transportation (Non-Medical): No   Physical Activity: Inactive (6/18/2024)    Exercise Vital Sign     Days of Exercise per Week: 0 days     Minutes of Exercise per Session: 0 min   Stress: Stress Concern Present (6/18/2024)    [employer] [place] [employer] - Occupational Stress Questionnaire     Feeling of Stress : To some extent   Housing Stability: Low Risk  (3/20/2024)    Housing Stability Vital Sign     Unable to Pay for Housing in the Last Year: No     Number of Places Lived in the Last Year: 1     Unstable Housing in the Last Year: No       OBJECTIVE:     Vital Signs Range (Last 24H):         Significant Labs:  Lab Results   Component Value Date    WBC 4.75 04/19/2024    HGB 13.0 04/19/2024    HCT 39.6 04/19/2024     04/19/2024    CHOL 182 08/26/2022    TRIG 89  08/26/2022    HDL 52 08/26/2022    ALT 41 04/19/2024    AST 34 04/19/2024     06/11/2024    K 4.4 06/11/2024     06/11/2024    CREATININE 1.2 06/11/2024    BUN 33 (H) 06/11/2024    CO2 26 06/11/2024    INR 1.0 08/25/2022       Diagnostic Studies: No relevant studies.    EKG:   Results for orders placed or performed during the hospital encounter of 03/18/24   EKG 12-lead    Collection Time: 03/22/24  9:40 AM   Result Value Ref Range    QRS Duration 90 ms    [facility] QTC Calculation 442 ms    Narrative    Test Reason : R07.89,    Vent. Rate : 079 BPM     Atrial Rate : 079 BPM     P-R Int : 190 ms          QRS Dur : 090 ms      QT Int : 386 ms       P-R-T Axes : 056 -57 053 degrees     QTc Int : 442 ms    Normal sinus rhythm  Left anterior fascicular block  Abnormal ECG  When compared with ECG of 20-MAR-2024 07:40,  No significant change was found  Confirmed by Richard FRANKEL MD ([id number]) on 3/22/2024 11:23:43 AM    Referred By: TUTU ACUÑA           Confirmed By:Richard FRANKEL MD       2D ECHO:  TTE:  No results found for this or any previous visit.    PHU:  No results found for this or any previous visit.    ASSESSMENT/PLAN:           Pre-op Assessment    I have reviewed the Patient Summary Reports.     I have reviewed the Nursing Notes. I have reviewed the NPO Status.   I have reviewed the Medications.     Review of Systems  Anesthesia Hx:  No problems with previous Anesthesia   History of prior surgery of interest to airway management or planning:          Denies Family Hx of Anesthesia complications.    Denies Personal Hx of Anesthesia complications.                    Social:  Non-Smoker, No Alcohol Use       Hematology/Oncology:       -- Denies Anemia:                  Denies Current/Recent Cancer                EENT/Dental:  EENT/Dental Normal           Cardiovascular:      Denies Hypertension.    Denies CAD.     Denies Dysrhythmias.   Denies Angina.  Denies CHF.    no hyperlipidemia     Functional Capacity unable  to determine, ABLE TO CLIMB 1 FLIGHT OF STAIRS  limited by disability  Coronary Artery Disease:  hx of myocardial infarction (MI).              Congestive Heart Failure (CHF)                Hypertension  , Recent typical clinic B/P of 115/78   Disorder of Cardiac Rhythm, Atrial Flutter     Pulmonary:    Denies COPD.  Denies Asthma.  Shortness of breath (with minimal exertion per patient)  Denies Recent URI.  Denies Sleep Apnea.                Renal/:   Denies Chronic Renal Disease.                Hepatic/GI:      Denies GERD. Denies Liver Disease.            Musculoskeletal:  Denies Arthritis.    Musculoskeletal General/Symptoms:  Functional capacity is ambulatory without assistance.            Neurological:    Denies CVA. Denies Neuromuscular Disease.   Denies Seizures.    AVM OF CEREBRAL VESSELS, H/O INTRA CRANIAL HEMORRHAGE      Denies Chronic Pain Syndrome    Seizure Disorder (LAST SEIZURE PER PATIENT  WAS A MONTH AGO AS OF 7/2), Chronic Epilepsy                          Endocrine:  Denies Diabetes.   Denies Hyperthyroidism.       Denies Obesity / BMI > 30  Psych:   denies anxiety denies depression ADJUSTMENT DISORDER WITH ANXIETY AND DEPRESSION               Physical Exam  General: Well nourished, Cooperative, Alert and Oriented    Airway:  Mallampati: II   Mouth Opening: Normal  Tongue: Normal    Dental:  Intact    Chest/Lungs:  Normal Respiratory Rate    Heart:  Rate: Normal        Anesthesia Plan  Type of Anesthesia, risks & benefits discussed:    Anesthesia Type: Gen ETT  Intra-op Monitoring Plan: Standard ASA Monitors  Post Op Pain Control Plan: IV/PO Opioids PRN  Induction:  IV  Airway Plan: Direct and Video, Post-Induction  Informed Consent: Informed consent signed with the Patient and all parties understand the risks and agree with anesthesia plan.  All questions answered.   ASA Score: 3  Day of Surgery Review of History & Physical: H&P Update referred to the surgeon/provider.    Ready For Surgery  From Anesthesia Perspective.     .

## 2024-07-09 NOTE — ANESTHESIA PROCEDURE NOTES
Intubation    Date/Time: 7/9/2024 2:07 PM    Performed by: Juancarlos Navarro CRNA  Authorized by: Leopold, Rhonda G, MD    Intubation:     Induction:  Intravenous    Intubated:  Postinduction    Mask Ventilation:  Easy with oral airway    Attempts:  1    Attempted By:  CRNA    Method of Intubation:  Direct    Blade:  Christine 3    Laryngeal View Grade: Grade I - full view of cords      Difficult Airway Encountered?: No      Complications:  None    Airway Device:  Oral endotracheal tube    Airway Device Size:  7.5    Style/Cuff Inflation:  Cuffed    Tube secured:  23    Secured at:  The lips    Placement Verified By:  Capnometry    Complicating Factors:  None    Findings Post-Intubation:  BS equal bilateral and atraumatic/condition of teeth unchanged

## 2024-07-09 NOTE — PLAN OF CARE
Pt arrived to 4 for avm embolization. Pt oriented to unit and staff, Pt safely transferred from stretcher to procedural table. Fall risk reviewed and comfort measures utilized with interventions. Safety strap applied, position pillows to minimize pressure points. Blankets applied. Pt prepped and draped utilizing standard sterile technique. Patient placed on continuous monitoring, as required by sedation policy. Timeouts implemented utilizing standard universal time-out per department and facility policy. CRNA to remain at bedside with continuous monitoring. Pt resting comfortably. Denies pain/discomfort. Will continue to monitor. See flow sheets for monitoring, medication administration, and updates. patient verbalizes understanding.

## 2024-07-09 NOTE — NURSING
Patient arrived to [facility] from IR     Report received from: Juancarlos HOOPER    Type of stroke/diagnosis:  s/p R temporal AVM embolization   Hemostasis time: 1554  Lay flat time until: 1754     Tenecteplase start and end time  N/a    Thrombectomy start and end time  N/a     Current symptoms: oriented x4, FC x4, denies headache, states LLE weaker (baseline)     Skin Assessment done: yes  Wounds noted: none, R groin site with c/d/I dressing   *If wounds noted, was Wound Care consulted? N/a  *If wounds noted, LDA placed? N/a   Skin Assessment Verified by:  Sara Chacon Completed? Pending once patient can sit up     Patient Belongings on Admit: brown purse, clothes, pink shoes, 3 phones in purse     [facility] notified: Leti NUÑEZ NP and Dr Jacqueline MOSES

## 2024-07-09 NOTE — PLAN OF CARE
To tolerated avm embolization well under crna care, rg groin dressing c/d/I, hemostasis obtain at 1554, bed rest up @ 1754, transfer to floor, report given @ bedside

## 2024-07-09 NOTE — ASSESSMENT & PLAN NOTE
"Patient is identified as having Combined Systolic and Diastolic heart failure that is Chronic. CHF is currently controlled. Latest ECHO performed and demonstrates- No results found for this or any previous visit.  . Continue ACE/ARB and monitor clinical status closely. Monitor on telemetry. Patient is off CHF pathway.  Monitor strict Is&Os and daily weights.  Place on fluid restriction of 1 L. Cardiology has not been consulted. Continue to stress to patient importance of self efficacy and  on diet for CHF. Last BNP reviewed- and noted below No results for input(s): "BNP", "BNPTRIAGEBLO" in the last 168 hours.      Hx of  "

## 2024-07-09 NOTE — NURSING
Pt complaining of some chest pain on left side of chest. Pt complaining of mild SOB. Tilted patient (patient still has to remain flat due to angio) and patient reports it feels better. VSS. Pt states that she sometimes has chest pain and SOB at home and it is self limiting and she doesn't take anything for it. 12 lead EKG performed. Leti NUÑEZ NP notified and ordered troponin lab. Patient updated at bedside. WCTM.

## 2024-07-09 NOTE — PLAN OF CARE
POC reviewed with patient and patient's family at 1800. Pt verbalized understanding. Pt progressing towards goals. All questions and concerns reviewed with patient. WCTM.   -NPO after midnight for surgery tmrrw  -12 lead EKG and troponin sent for chest pain

## 2024-07-09 NOTE — PROCEDURES
Interventional Neuroradiology Post-Procedure Note    Pre Op Diagnosis: R Temporal AVM    Post Op Diagnosis: Same, S/p Embolization    Procedure: Diagnostic cerebral angiogram and Liquid Embolization    Procedure performed by: Elana MOSES, Bill; Cammie MOSES, Moises    Written Informed Consent Obtained: Yes    Specimen Removed: NO    Estimated Blood Loss: Minimal    Procedure report:   A 8F sheath was placed into the right femoral artery and a 5F Burt catheter was advanced into the aortic arch.  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    Preliminary intervention: Successful liquid embolization of an arterial pedicle from the right MCA using Westhoff 18.  Please see Imaging report for full details.    A right femoral artery angiogram was performed, the sheath removed and hemostasis achieved using 8F Angioseal.  No hematoma was present at the time of hemostasis.    The patient tolerated the procedure well.     Plan:  -To [facility] for monitoring  -Bed rest for 2h  -Groin check and pulse check q2h   -Avoid carrying heavy weights > 10 lbs x 24 hrs   -Remove groin dressing tomorrow                       Moises Bonds MD, MHA  Fellow, NeuroEndovascular Surgery, [facility] [place]  Neurologist, [facility] [place], [place]

## 2024-07-09 NOTE — H&P
Interventional Neuroradiology Pre-procedure Note    Procedure: Diagnostic cerebral angiogram and AVM embolization    History of Present Illness:  Haydee Linda is a 69 y.o. female with h/o seizures and recently discovered SM-1 right temporal AVM presents for pre-op AVM embolization (right temporal lobectomy is scheduled by [facility]).     ROS:   Hematological: no known coagulopathies  Respiratory: no shortness of breath  Cardiovascular: no chest pain  Gastrointestinal: no abdominal pain  Genito-Urinary: no dysuria  Musculoskeletal: negative  Neurological: no TIA or stroke symptoms     Scheduled Meds:   Current Meds:   Current Outpatient Medications:     acetaminophen (TYLENOL) 500 MG tablet, Take 1,000 mg by mouth daily as needed for Pain., Disp: , Rfl:     cloBAZam (ONFI) 20 mg Tab, Take 2 tablets (40 mg total) by mouth every evening., Disp: 60 tablet, Rfl: 5    docusate sodium (COLACE) 100 MG capsule, Take 100 mg by mouth 2 (two) times daily., Disp: , Rfl:     levETIRAcetam (KEPPRA) 1000 MG tablet, Take 2 tablets (2,000 mg total) by mouth 2 (two) times daily., Disp: 360 tablet, Rfl: 3    losartan (COZAAR) 50 MG tablet, Take 50 mg by mouth once daily., Disp: , Rfl:     magnesium gluconate 27.5 mg magne- sium (500 mg) Tab, Take 500 mg by mouth 2 (two) times daily., Disp: , Rfl:     methyl salicylate/menthol (ICY HOT TOP), Apply topically. With lidocaine, Disp: , Rfl:     midazolam (NAYZILAM) 5 mg/spray (0.1 mL) Spry, 1 spray by Nasal route every 10 (ten) minutes as needed (cluster seizures). 1 spray for seizures more than 3min or more than 3 seizures in 24 hours. May give second dose after 10 min if seizures persist. (Patient not taking: Reported on 6/25/2024), Disp: 2 each, Rfl: 5    multivitamin (THERAGRAN) per tablet, Take 1 tablet by mouth once daily. With vegetables, Disp: , Rfl:     NIFEdipine (PROCARDIA-XL) 30 MG (OSM) 24 hr tablet, Take 30 mg by mouth 2 (two) times a day., Disp: , Rfl:     OXcarbazepine  (TRILEPTAL) 300 MG Tab, Take 1 tablet (300 mg total) by mouth 2 (two) times daily. (Patient taking differently: Take by mouth 2 (two) times daily.), Disp: 180 tablet, Rfl: 3    potassium chloride 10% (KAYCIEL) 20 mEq/15 mL oral solution, Take 40 mEq by mouth 2 (two) times daily. She has not taking liquid potassium and is taking 40 mEq by mouth once a day in a pill form, Disp: , Rfl:     torsemide (DEMADEX) 20 MG Tab, Take 40 mg by mouth once daily., Disp: , Rfl:     UNABLE TO FIND, medication name: SUPER BEET, Disp: , Rfl:    Continuous Infusions:   PRN Meds:    Allergies:   Review of patient's allergies indicates:   Allergen Reactions    Opioids - morphine analogues Hallucinations     Can tolerate Codeine, Dilaudid, oxycodone, hydrocodone     Sedation Hx: No adverse events.    Labs:              Objective:  Vitals: There were no vitals taken for this visit.     Physical Exam:  General: well developed, well nourished, no distress.   Head: normocephalic, atraumatic  Neck: No tracheal deviation. No palpable masses. Full ROM.   Neurologic: Alert and oriented. Thought content appropriate.  GCS: E4 V5 M6; Total: 15  Language: No aphasia  Speech: No dysarthria  Cranial nerves: face symmetric, tongue midline, CN II-XII grossly intact.   Eyes: pupils equal, round, reactive to light with accomodation, EOMI.   Pulmonary: normal respirations, no signs of respiratory distress  Abdomen: soft, non-distended, not tender to palpation  Vascular: Pulses 2+ and symmetric radial and dorsalis pedis. No LE edema.   Skin: Skin is warm, dry and intact.  Sensory: intact to light touch throughout  Motor Strength: Moves all extremities spontaneously with good tone.  Full strength upper and lower extremities. No abnormal movements seen.     ASA: 2  MAL: 2    Plan:  -Plan for cerebral angiogram with possible intervention  -Sedation Plan: General anesthesia  -All diagnostics and imaging reviewed  -Patient NPO since MN  -Risks & benefits of  procedure explained in detail; patient consented and all questions answered  -Further reccs to follow procedure          Moises Bonds MD, MHA  Fellow, NeuroEndovascular Surgery, [facility] [place]  Neurologist, [facility] [place], [place]

## 2024-07-09 NOTE — TRANSFER OF CARE
"Anesthesia Transfer of Care Note    Patient: Haydee Linda    Procedure(s) Performed: * No procedures listed *    Patient location: ICU    Anesthesia Type: general    Transport from OR: Transported from OR on 6-10 L/min O2 by face mask with adequate spontaneous ventilation. Continuous ECG monitoring in transport. Continuous SpO2 monitoring in transport. Continuos invasive BP monitoring in transport    Post pain: adequate analgesia    Post assessment: no apparent anesthetic complications and tolerated procedure well    Post vital signs: stable    Level of consciousness: awake and alert    Nausea/Vomiting: no nausea/vomiting    Complications: none    Transfer of care protocol was followed    Last vitals: Visit Vitals  /69 (BP Location: Right arm, Patient Position: Lying)   Pulse 66   Temp 36.2 °C (97.2 °F) (Temporal)   Resp 17   Ht 5' 8" (1.727 m)   Wt 75.8 kg (167 lb)   SpO2 99%   Breastfeeding No   BMI 25.39 kg/m²     "

## 2024-07-10 ENCOUNTER — PATIENT MESSAGE (OUTPATIENT)
Dept: NEUROSURGERY | Facility: CLINIC | Age: 69
End: 2024-07-10
Payer: MEDICARE

## 2024-07-10 ENCOUNTER — ANESTHESIA (OUTPATIENT)
Dept: SURGERY | Facility: HOSPITAL | Age: 69
End: 2024-07-10
Payer: MEDICARE

## 2024-07-10 LAB
ALBUMIN SERPL BCP-MCNC: 3.8 G/DL (ref 3.5–5.2)
ALP SERPL-CCNC: 99 U/L (ref 55–135)
ALT SERPL W/O P-5'-P-CCNC: 20 U/L (ref 10–44)
ANION GAP SERPL CALC-SCNC: 7 MMOL/L (ref 8–16)
ANION GAP SERPL CALC-SCNC: 8 MMOL/L (ref 8–16)
APTT PPP: 30.8 SEC (ref 21–32)
AST SERPL-CCNC: 17 U/L (ref 10–40)
BASOPHILS # BLD AUTO: 0.01 K/UL (ref 0–0.2)
BASOPHILS # BLD AUTO: 0.02 K/UL (ref 0–0.2)
BASOPHILS NFR BLD: 0.1 % (ref 0–1.9)
BASOPHILS NFR BLD: 0.4 % (ref 0–1.9)
BILIRUB SERPL-MCNC: 0.3 MG/DL (ref 0.1–1)
BUN SERPL-MCNC: 14 MG/DL (ref 8–23)
BUN SERPL-MCNC: 9 MG/DL (ref 8–23)
CALCIUM SERPL-MCNC: 9.3 MG/DL (ref 8.7–10.5)
CALCIUM SERPL-MCNC: 9.5 MG/DL (ref 8.7–10.5)
CHLORIDE SERPL-SCNC: 101 MMOL/L (ref 95–110)
CHLORIDE SERPL-SCNC: 103 MMOL/L (ref 95–110)
CO2 SERPL-SCNC: 21 MMOL/L (ref 23–29)
CO2 SERPL-SCNC: 22 MMOL/L (ref 23–29)
CREAT SERPL-MCNC: 0.8 MG/DL (ref 0.5–1.4)
CREAT SERPL-MCNC: 0.9 MG/DL (ref 0.5–1.4)
DIFFERENTIAL METHOD BLD: ABNORMAL
DIFFERENTIAL METHOD BLD: ABNORMAL
EOSINOPHIL # BLD AUTO: 0 K/UL (ref 0–0.5)
EOSINOPHIL # BLD AUTO: 0 K/UL (ref 0–0.5)
EOSINOPHIL NFR BLD: 0 % (ref 0–8)
EOSINOPHIL NFR BLD: 0.2 % (ref 0–8)
ERYTHROCYTE [DISTWIDTH] IN BLOOD BY AUTOMATED COUNT: 12.6 % (ref 11.5–14.5)
ERYTHROCYTE [DISTWIDTH] IN BLOOD BY AUTOMATED COUNT: 12.6 % (ref 11.5–14.5)
EST. GFR  (NO RACE VARIABLE): >60 ML/MIN/1.73 M^2
EST. GFR  (NO RACE VARIABLE): >60 ML/MIN/1.73 M^2
GLUCOSE SERPL-MCNC: 117 MG/DL (ref 70–110)
GLUCOSE SERPL-MCNC: 122 MG/DL (ref 70–110)
GLUCOSE SERPL-MCNC: 125 MG/DL (ref 70–110)
GLUCOSE SERPL-MCNC: 183 MG/DL (ref 70–110)
GLUCOSE SERPL-MCNC: 89 MG/DL (ref 70–110)
HCO3 UR-SCNC: 21.4 MMOL/L (ref 24–28)
HCO3 UR-SCNC: 21.7 MMOL/L (ref 24–28)
HCO3 UR-SCNC: 21.8 MMOL/L (ref 24–28)
HCT VFR BLD AUTO: 28.9 % (ref 37–48.5)
HCT VFR BLD AUTO: 30.3 % (ref 37–48.5)
HCT VFR BLD CALC: 25 %PCV (ref 36–54)
HCT VFR BLD CALC: 27 %PCV (ref 36–54)
HCT VFR BLD CALC: 27 %PCV (ref 36–54)
HGB BLD-MCNC: 9.9 G/DL (ref 12–16)
HGB BLD-MCNC: 9.9 G/DL (ref 12–16)
IMM GRANULOCYTES # BLD AUTO: 0.02 K/UL (ref 0–0.04)
IMM GRANULOCYTES # BLD AUTO: 0.03 K/UL (ref 0–0.04)
IMM GRANULOCYTES NFR BLD AUTO: 0.3 % (ref 0–0.5)
IMM GRANULOCYTES NFR BLD AUTO: 0.4 % (ref 0–0.5)
LYMPHOCYTES # BLD AUTO: 1.2 K/UL (ref 1–4.8)
LYMPHOCYTES # BLD AUTO: 1.6 K/UL (ref 1–4.8)
LYMPHOCYTES NFR BLD: 15.9 % (ref 18–48)
LYMPHOCYTES NFR BLD: 21.6 % (ref 18–48)
MAGNESIUM SERPL-MCNC: 2 MG/DL (ref 1.6–2.6)
MCH RBC QN AUTO: 29.6 PG (ref 27–31)
MCH RBC QN AUTO: 29.7 PG (ref 27–31)
MCHC RBC AUTO-ENTMCNC: 32.7 G/DL (ref 32–36)
MCHC RBC AUTO-ENTMCNC: 34.3 G/DL (ref 32–36)
MCV RBC AUTO: 87 FL (ref 82–98)
MCV RBC AUTO: 91 FL (ref 82–98)
MONOCYTES # BLD AUTO: 0.2 K/UL (ref 0.3–1)
MONOCYTES # BLD AUTO: 0.3 K/UL (ref 0.3–1)
MONOCYTES NFR BLD: 2.4 % (ref 4–15)
MONOCYTES NFR BLD: 5.3 % (ref 4–15)
NEUTROPHILS # BLD AUTO: 3.9 K/UL (ref 1.8–7.7)
NEUTROPHILS # BLD AUTO: 8 K/UL (ref 1.8–7.7)
NEUTROPHILS NFR BLD: 72.1 % (ref 38–73)
NEUTROPHILS NFR BLD: 81.3 % (ref 38–73)
NRBC BLD-RTO: 0 /100 WBC
NRBC BLD-RTO: 0 /100 WBC
OHS QRS DURATION: 92 MS
OHS QTC CALCULATION: 480 MS
PCO2 BLDA: 30.3 MMHG (ref 35–45)
PCO2 BLDA: 30.5 MMHG (ref 35–45)
PCO2 BLDA: 35.8 MMHG (ref 35–45)
PH SMN: 7.39 [PH] (ref 7.35–7.45)
PH SMN: 7.46 [PH] (ref 7.35–7.45)
PH SMN: 7.46 [PH] (ref 7.35–7.45)
PHOSPHATE SERPL-MCNC: 3.4 MG/DL (ref 2.7–4.5)
PLATELET # BLD AUTO: 242 K/UL (ref 150–450)
PLATELET # BLD AUTO: 295 K/UL (ref 150–450)
PMV BLD AUTO: 9.6 FL (ref 9.2–12.9)
PMV BLD AUTO: 9.8 FL (ref 9.2–12.9)
PO2 BLDA: 192 MMHG (ref 80–100)
PO2 BLDA: 212 MMHG (ref 80–100)
PO2 BLDA: 216 MMHG (ref 80–100)
POC BE: -2 MMOL/L
POC BE: -2 MMOL/L
POC BE: -3 MMOL/L
POC IONIZED CALCIUM: 1.19 MMOL/L (ref 1.06–1.42)
POC IONIZED CALCIUM: 1.19 MMOL/L (ref 1.06–1.42)
POC IONIZED CALCIUM: 1.2 MMOL/L (ref 1.06–1.42)
POC SATURATED O2: 100 % (ref 95–100)
POC TCO2: 22 MMOL/L (ref 23–27)
POC TCO2: 23 MMOL/L (ref 23–27)
POC TCO2: 23 MMOL/L (ref 23–27)
POTASSIUM BLD-SCNC: 4.2 MMOL/L (ref 3.5–5.1)
POTASSIUM BLD-SCNC: 4.3 MMOL/L (ref 3.5–5.1)
POTASSIUM BLD-SCNC: 4.3 MMOL/L (ref 3.5–5.1)
POTASSIUM SERPL-SCNC: 4.3 MMOL/L (ref 3.5–5.1)
POTASSIUM SERPL-SCNC: 4.8 MMOL/L (ref 3.5–5.1)
PROT SERPL-MCNC: 6.8 G/DL (ref 6–8.4)
RBC # BLD AUTO: 3.33 M/UL (ref 4–5.4)
RBC # BLD AUTO: 3.34 M/UL (ref 4–5.4)
SAMPLE: ABNORMAL
SODIUM BLD-SCNC: 132 MMOL/L (ref 136–145)
SODIUM BLD-SCNC: 133 MMOL/L (ref 136–145)
SODIUM BLD-SCNC: 133 MMOL/L (ref 136–145)
SODIUM SERPL-SCNC: 130 MMOL/L (ref 136–145)
SODIUM SERPL-SCNC: 132 MMOL/L (ref 136–145)
TROPONIN I SERPL DL<=0.01 NG/ML-MCNC: <0.006 NG/ML (ref 0–0.03)
WBC # BLD AUTO: 5.33 K/UL (ref 3.9–12.7)
WBC # BLD AUTO: 9.86 K/UL (ref 3.9–12.7)

## 2024-07-10 PROCEDURE — 86920 COMPATIBILITY TEST SPIN: CPT | Performed by: NEUROLOGICAL SURGERY

## 2024-07-10 PROCEDURE — 25000003 PHARM REV CODE 250

## 2024-07-10 PROCEDURE — 88341 IMHCHEM/IMCYTCHM EA ADD ANTB: CPT | Performed by: STUDENT IN AN ORGANIZED HEALTH CARE EDUCATION/TRAINING PROGRAM

## 2024-07-10 PROCEDURE — 20000000 HC ICU ROOM

## 2024-07-10 PROCEDURE — 88305 TISSUE EXAM BY PATHOLOGIST: CPT | Mod: 59 | Performed by: STUDENT IN AN ORGANIZED HEALTH CARE EDUCATION/TRAINING PROGRAM

## 2024-07-10 PROCEDURE — 85025 COMPLETE CBC W/AUTO DIFF WBC: CPT | Performed by: RADIOLOGY

## 2024-07-10 PROCEDURE — 51702 INSERT TEMP BLADDER CATH: CPT

## 2024-07-10 PROCEDURE — 25000003 PHARM REV CODE 250: Performed by: STUDENT IN AN ORGANIZED HEALTH CARE EDUCATION/TRAINING PROGRAM

## 2024-07-10 PROCEDURE — 61680 INTRACRANIAL VESSEL SURGERY: CPT | Mod: ,,, | Performed by: NEUROLOGICAL SURGERY

## 2024-07-10 PROCEDURE — 05BL0ZZ EXCISION OF INTRACRANIAL VEIN, OPEN APPROACH: ICD-10-PCS | Performed by: NEUROLOGICAL SURGERY

## 2024-07-10 PROCEDURE — 36000713 HC OR TIME LEV V EA ADD 15 MIN: Performed by: NEUROLOGICAL SURGERY

## 2024-07-10 PROCEDURE — 37000009 HC ANESTHESIA EA ADD 15 MINS: Performed by: NEUROLOGICAL SURGERY

## 2024-07-10 PROCEDURE — 25000003 PHARM REV CODE 250: Performed by: NEUROLOGICAL SURGERY

## 2024-07-10 PROCEDURE — 84100 ASSAY OF PHOSPHORUS: CPT | Performed by: RADIOLOGY

## 2024-07-10 PROCEDURE — 69990 MICROSURGERY ADD-ON: CPT | Mod: 59,,, | Performed by: NEUROLOGICAL SURGERY

## 2024-07-10 PROCEDURE — 63600175 PHARM REV CODE 636 W HCPCS

## 2024-07-10 PROCEDURE — 00B00ZZ EXCISION OF BRAIN, OPEN APPROACH: ICD-10-PCS | Performed by: NEUROLOGICAL SURGERY

## 2024-07-10 PROCEDURE — C1713 ANCHOR/SCREW BN/BN,TIS/BN: HCPCS | Performed by: NEUROLOGICAL SURGERY

## 2024-07-10 PROCEDURE — 03BG0ZZ EXCISION OF INTRACRANIAL ARTERY, OPEN APPROACH: ICD-10-PCS | Performed by: NEUROLOGICAL SURGERY

## 2024-07-10 PROCEDURE — 80048 BASIC METABOLIC PNL TOTAL CA: CPT | Mod: XB

## 2024-07-10 PROCEDURE — 25000003 PHARM REV CODE 250: Performed by: NURSE PRACTITIONER

## 2024-07-10 PROCEDURE — 36000712 HC OR TIME LEV V 1ST 15 MIN: Performed by: NEUROLOGICAL SURGERY

## 2024-07-10 PROCEDURE — 25000003 PHARM REV CODE 250: Performed by: PHYSICIAN ASSISTANT

## 2024-07-10 PROCEDURE — 27800903 OPTIME MED/SURG SUP & DEVICES OTHER IMPLANTS: Performed by: NEUROLOGICAL SURGERY

## 2024-07-10 PROCEDURE — 27201423 OPTIME MED/SURG SUP & DEVICES STERILE SUPPLY: Performed by: NEUROLOGICAL SURGERY

## 2024-07-10 PROCEDURE — 63600175 PHARM REV CODE 636 W HCPCS: Performed by: STUDENT IN AN ORGANIZED HEALTH CARE EDUCATION/TRAINING PROGRAM

## 2024-07-10 PROCEDURE — 84484 ASSAY OF TROPONIN QUANT: CPT

## 2024-07-10 PROCEDURE — 88313 SPECIAL STAINS GROUP 2: CPT | Performed by: STUDENT IN AN ORGANIZED HEALTH CARE EDUCATION/TRAINING PROGRAM

## 2024-07-10 PROCEDURE — 85730 THROMBOPLASTIN TIME PARTIAL: CPT

## 2024-07-10 PROCEDURE — 88342 IMHCHEM/IMCYTCHM 1ST ANTB: CPT | Mod: 59 | Performed by: STUDENT IN AN ORGANIZED HEALTH CARE EDUCATION/TRAINING PROGRAM

## 2024-07-10 PROCEDURE — 63600175 PHARM REV CODE 636 W HCPCS: Performed by: NEUROLOGICAL SURGERY

## 2024-07-10 PROCEDURE — C1729 CATH, DRAINAGE: HCPCS | Performed by: NEUROLOGICAL SURGERY

## 2024-07-10 PROCEDURE — 61781 SCAN PROC CRANIAL INTRA: CPT | Mod: ,,, | Performed by: NEUROLOGICAL SURGERY

## 2024-07-10 PROCEDURE — 85025 COMPLETE CBC W/AUTO DIFF WBC: CPT | Mod: 91

## 2024-07-10 PROCEDURE — 83735 ASSAY OF MAGNESIUM: CPT | Performed by: RADIOLOGY

## 2024-07-10 PROCEDURE — 37000008 HC ANESTHESIA 1ST 15 MINUTES: Performed by: NEUROLOGICAL SURGERY

## 2024-07-10 PROCEDURE — 80053 COMPREHEN METABOLIC PANEL: CPT | Performed by: RADIOLOGY

## 2024-07-10 PROCEDURE — 61537 REMOVAL OF BRAIN TISSUE: CPT | Mod: ,,, | Performed by: NEUROLOGICAL SURGERY

## 2024-07-10 PROCEDURE — 99291 CRITICAL CARE FIRST HOUR: CPT | Mod: FS,,, | Performed by: INTERNAL MEDICINE

## 2024-07-10 DEVICE — IMPLANTABLE DEVICE: Type: IMPLANTABLE DEVICE | Site: CRANIAL | Status: FUNCTIONAL

## 2024-07-10 DEVICE — PLATE CRANIAL UN3 BOX LG 2X2: Type: IMPLANTABLE DEVICE | Site: CRANIAL | Status: FUNCTIONAL

## 2024-07-10 DEVICE — SCREW UN3 AXS SD 1.5X4MM: Type: IMPLANTABLE DEVICE | Site: CRANIAL | Status: FUNCTIONAL

## 2024-07-10 DEVICE — PLATE BONE 2X2 HOLE SM BOX: Type: IMPLANTABLE DEVICE | Site: CRANIAL | Status: FUNCTIONAL

## 2024-07-10 RX ORDER — OXYCODONE HYDROCHLORIDE 5 MG/1
5 TABLET ORAL EVERY 6 HOURS PRN
Status: DISCONTINUED | OUTPATIENT
Start: 2024-07-10 | End: 2024-07-11

## 2024-07-10 RX ORDER — ONDANSETRON HYDROCHLORIDE 2 MG/ML
INJECTION, SOLUTION INTRAVENOUS
Status: DISCONTINUED | OUTPATIENT
Start: 2024-07-10 | End: 2024-07-10

## 2024-07-10 RX ORDER — OXYCODONE HYDROCHLORIDE 5 MG/1
5 TABLET ORAL EVERY 4 HOURS PRN
Status: CANCELLED | OUTPATIENT
Start: 2024-07-10

## 2024-07-10 RX ORDER — ACETAMINOPHEN 325 MG/1
650 TABLET ORAL EVERY 4 HOURS PRN
Status: CANCELLED | OUTPATIENT
Start: 2024-07-10

## 2024-07-10 RX ORDER — CEFTRIAXONE 1 G/1
INJECTION, POWDER, FOR SOLUTION INTRAMUSCULAR; INTRAVENOUS
Status: DISCONTINUED | OUTPATIENT
Start: 2024-07-10 | End: 2024-07-10

## 2024-07-10 RX ORDER — BACITRACIN 500 [USP'U]/G
OINTMENT TOPICAL
Status: DISCONTINUED | OUTPATIENT
Start: 2024-07-10 | End: 2024-07-10 | Stop reason: HOSPADM

## 2024-07-10 RX ORDER — OXYCODONE HYDROCHLORIDE 10 MG/1
10 TABLET ORAL EVERY 4 HOURS PRN
Status: CANCELLED | OUTPATIENT
Start: 2024-07-10

## 2024-07-10 RX ORDER — NICARDIPINE HYDROCHLORIDE 0.2 MG/ML
INJECTION INTRAVENOUS CONTINUOUS PRN
Status: DISCONTINUED | OUTPATIENT
Start: 2024-07-10 | End: 2024-07-10

## 2024-07-10 RX ORDER — MIDAZOLAM HYDROCHLORIDE 1 MG/ML
INJECTION INTRAMUSCULAR; INTRAVENOUS
Status: DISCONTINUED | OUTPATIENT
Start: 2024-07-10 | End: 2024-07-10

## 2024-07-10 RX ORDER — DEXAMETHASONE SODIUM PHOSPHATE 4 MG/ML
4 INJECTION, SOLUTION INTRA-ARTICULAR; INTRALESIONAL; INTRAMUSCULAR; INTRAVENOUS; SOFT TISSUE EVERY 6 HOURS
Status: CANCELLED | OUTPATIENT
Start: 2024-07-10

## 2024-07-10 RX ORDER — LEVETIRACETAM 500 MG/5ML
500 INJECTION, SOLUTION, CONCENTRATE INTRAVENOUS EVERY 12 HOURS
Status: CANCELLED | OUTPATIENT
Start: 2024-07-10

## 2024-07-10 RX ORDER — LEVETIRACETAM 500 MG/5ML
INJECTION, SOLUTION, CONCENTRATE INTRAVENOUS
Status: DISCONTINUED | OUTPATIENT
Start: 2024-07-10 | End: 2024-07-10

## 2024-07-10 RX ORDER — HYDROMORPHONE HYDROCHLORIDE 1 MG/ML
0.5 INJECTION, SOLUTION INTRAMUSCULAR; INTRAVENOUS; SUBCUTANEOUS ONCE
Status: COMPLETED | OUTPATIENT
Start: 2024-07-10 | End: 2024-07-10

## 2024-07-10 RX ORDER — DEXAMETHASONE SODIUM PHOSPHATE 4 MG/ML
INJECTION, SOLUTION INTRA-ARTICULAR; INTRALESIONAL; INTRAMUSCULAR; INTRAVENOUS; SOFT TISSUE
Status: DISCONTINUED | OUTPATIENT
Start: 2024-07-10 | End: 2024-07-10

## 2024-07-10 RX ORDER — ONDANSETRON HYDROCHLORIDE 2 MG/ML
4 INJECTION, SOLUTION INTRAVENOUS EVERY 12 HOURS PRN
Status: CANCELLED | OUTPATIENT
Start: 2024-07-10

## 2024-07-10 RX ORDER — LIDOCAINE HYDROCHLORIDE 20 MG/ML
INJECTION, SOLUTION EPIDURAL; INFILTRATION; INTRACAUDAL; PERINEURAL
Status: DISCONTINUED | OUTPATIENT
Start: 2024-07-10 | End: 2024-07-10

## 2024-07-10 RX ORDER — SODIUM CHLORIDE 9 MG/ML
INJECTION, SOLUTION INTRAVENOUS CONTINUOUS
Status: DISCONTINUED | OUTPATIENT
Start: 2024-07-10 | End: 2024-07-11

## 2024-07-10 RX ORDER — FENTANYL CITRATE 50 UG/ML
25 INJECTION, SOLUTION INTRAMUSCULAR; INTRAVENOUS ONCE
Status: COMPLETED | OUTPATIENT
Start: 2024-07-10 | End: 2024-07-10

## 2024-07-10 RX ORDER — FAMOTIDINE 10 MG/ML
20 INJECTION INTRAVENOUS EVERY 12 HOURS
Status: CANCELLED | OUTPATIENT
Start: 2024-07-10

## 2024-07-10 RX ORDER — PROPOFOL 10 MG/ML
VIAL (ML) INTRAVENOUS
Status: DISCONTINUED | OUTPATIENT
Start: 2024-07-10 | End: 2024-07-10

## 2024-07-10 RX ORDER — FENTANYL CITRATE 50 UG/ML
INJECTION, SOLUTION INTRAMUSCULAR; INTRAVENOUS
Status: DISCONTINUED | OUTPATIENT
Start: 2024-07-10 | End: 2024-07-10

## 2024-07-10 RX ORDER — METOCLOPRAMIDE HYDROCHLORIDE 5 MG/ML
5 INJECTION INTRAMUSCULAR; INTRAVENOUS EVERY 6 HOURS PRN
Status: CANCELLED | OUTPATIENT
Start: 2024-07-10

## 2024-07-10 RX ORDER — NICARDIPINE HYDROCHLORIDE 0.2 MG/ML
0-15 INJECTION INTRAVENOUS CONTINUOUS
Status: DISCONTINUED | OUTPATIENT
Start: 2024-07-10 | End: 2024-07-11

## 2024-07-10 RX ORDER — EPHEDRINE SULFATE 50 MG/ML
INJECTION, SOLUTION INTRAVENOUS
Status: DISCONTINUED | OUTPATIENT
Start: 2024-07-10 | End: 2024-07-10

## 2024-07-10 RX ORDER — MUPIROCIN 20 MG/G
OINTMENT TOPICAL 2 TIMES DAILY
Status: CANCELLED | OUTPATIENT
Start: 2024-07-10 | End: 2024-07-13

## 2024-07-10 RX ORDER — SUCCINYLCHOLINE CHLORIDE 20 MG/ML
INJECTION INTRAMUSCULAR; INTRAVENOUS
Status: DISCONTINUED | OUTPATIENT
Start: 2024-07-10 | End: 2024-07-10

## 2024-07-10 RX ORDER — HEPARIN SODIUM 5000 [USP'U]/ML
5000 INJECTION, SOLUTION INTRAVENOUS; SUBCUTANEOUS EVERY 8 HOURS
Status: DISCONTINUED | OUTPATIENT
Start: 2024-07-11 | End: 2024-07-17 | Stop reason: HOSPADM

## 2024-07-10 RX ORDER — CEFTRIAXONE 1 G/1
INJECTION, POWDER, FOR SOLUTION INTRAMUSCULAR; INTRAVENOUS
Status: DISCONTINUED | OUTPATIENT
Start: 2024-07-10 | End: 2024-07-10 | Stop reason: HOSPADM

## 2024-07-10 RX ORDER — LIDOCAINE HYDROCHLORIDE AND EPINEPHRINE 10; 10 MG/ML; UG/ML
INJECTION, SOLUTION INFILTRATION; PERINEURAL
Status: DISCONTINUED | OUTPATIENT
Start: 2024-07-10 | End: 2024-07-10 | Stop reason: HOSPADM

## 2024-07-10 RX ADMIN — LEVETIRACETAM 2000 MG: 100 INJECTION, SOLUTION INTRAVENOUS at 08:07

## 2024-07-10 RX ADMIN — ONDANSETRON 4 MG: 2 INJECTION INTRAMUSCULAR; INTRAVENOUS at 01:07

## 2024-07-10 RX ADMIN — PROPOFOL 150 MG: 10 INJECTION, EMULSION INTRAVENOUS at 08:07

## 2024-07-10 RX ADMIN — FENTANYL CITRATE 100 MCG: 50 INJECTION INTRAMUSCULAR; INTRAVENOUS at 08:07

## 2024-07-10 RX ADMIN — SODIUM CHLORIDE, SODIUM LACTATE, POTASSIUM CHLORIDE, AND CALCIUM CHLORIDE: .6; .31; .03; .02 INJECTION, SOLUTION INTRAVENOUS at 01:07

## 2024-07-10 RX ADMIN — ACETAMINOPHEN 650 MG: 325 TABLET ORAL at 07:07

## 2024-07-10 RX ADMIN — FENTANYL CITRATE 25 MCG: 50 INJECTION INTRAMUSCULAR; INTRAVENOUS at 02:07

## 2024-07-10 RX ADMIN — CEFTRIAXONE 2 G: 1 INJECTION, POWDER, FOR SOLUTION INTRAMUSCULAR; INTRAVENOUS at 08:07

## 2024-07-10 RX ADMIN — DEXAMETHASONE SODIUM PHOSPHATE 10 MG: 4 INJECTION INTRA-ARTICULAR; INTRALESIONAL; INTRAMUSCULAR; INTRAVENOUS; SOFT TISSUE at 09:07

## 2024-07-10 RX ADMIN — PROPOFOL 30 MG: 10 INJECTION, EMULSION INTRAVENOUS at 08:07

## 2024-07-10 RX ADMIN — NICARDIPINE HYDROCHLORIDE 10 MG/HR: 0.2 INJECTION, SOLUTION INTRAVENOUS at 02:07

## 2024-07-10 RX ADMIN — LIDOCAINE HYDROCHLORIDE 80 MG: 20 INJECTION, SOLUTION EPIDURAL; INFILTRATION; INTRACAUDAL at 08:07

## 2024-07-10 RX ADMIN — SODIUM CHLORIDE 0.2 MCG/KG/MIN: 9 INJECTION, SOLUTION INTRAVENOUS at 08:07

## 2024-07-10 RX ADMIN — LEVETIRACETAM 2000 MG: 750 TABLET, FILM COATED ORAL at 08:07

## 2024-07-10 RX ADMIN — EPHEDRINE SULFATE 10 MG: 50 INJECTION INTRAVENOUS at 02:07

## 2024-07-10 RX ADMIN — FENTANYL CITRATE 50 MCG: 50 INJECTION INTRAMUSCULAR; INTRAVENOUS at 08:07

## 2024-07-10 RX ADMIN — NIFEDIPINE 30 MG: 30 TABLET, FILM COATED, EXTENDED RELEASE ORAL at 08:07

## 2024-07-10 RX ADMIN — CEFAZOLIN 2 G: 2 INJECTION, POWDER, FOR SOLUTION INTRAMUSCULAR; INTRAVENOUS at 07:07

## 2024-07-10 RX ADMIN — SUCCINYLCHOLINE CHLORIDE 140 MG: 20 INJECTION, SOLUTION INTRAMUSCULAR; INTRAVENOUS; PARENTERAL at 08:07

## 2024-07-10 RX ADMIN — SENNOSIDES AND DOCUSATE SODIUM 1 TABLET: 50; 8.6 TABLET ORAL at 08:07

## 2024-07-10 RX ADMIN — SODIUM CHLORIDE: 9 INJECTION, SOLUTION INTRAVENOUS at 06:07

## 2024-07-10 RX ADMIN — SODIUM CHLORIDE, SODIUM LACTATE, POTASSIUM CHLORIDE, AND CALCIUM CHLORIDE: .6; .31; .03; .02 INJECTION, SOLUTION INTRAVENOUS at 07:07

## 2024-07-10 RX ADMIN — ACETAMINOPHEN 650 MG: 325 TABLET ORAL at 04:07

## 2024-07-10 RX ADMIN — PHENYLEPHRINE HYDROCHLORIDE 0.1 MCG/KG/MIN: 10 INJECTION INTRAVENOUS at 09:07

## 2024-07-10 RX ADMIN — CLOBAZAM 40 MG: 10 TABLET ORAL at 08:07

## 2024-07-10 RX ADMIN — MIDAZOLAM 2 MG: 1 INJECTION INTRAMUSCULAR; INTRAVENOUS at 08:07

## 2024-07-10 RX ADMIN — OXYCODONE 5 MG: 5 TABLET ORAL at 06:07

## 2024-07-10 RX ADMIN — OXCARBAZEPINE 300 MG: 300 TABLET, FILM COATED ORAL at 08:07

## 2024-07-10 RX ADMIN — FENTANYL CITRATE 25 MCG: 50 INJECTION INTRAMUSCULAR; INTRAVENOUS at 01:07

## 2024-07-10 RX ADMIN — HYDROMORPHONE HYDROCHLORIDE 0.5 MG: 1 INJECTION, SOLUTION INTRAMUSCULAR; INTRAVENOUS; SUBCUTANEOUS at 08:07

## 2024-07-10 RX ADMIN — NICARDIPINE HYDROCHLORIDE 5 MG/HR: 0.2 INJECTION, SOLUTION INTRAVENOUS at 02:07

## 2024-07-10 NOTE — PLAN OF CARE
Pending OR today for AVM resection. CTA head complete, vital signs stable throughout shift. NPO at midnight.
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
## 2024-07-10 NOTE — PLAN OF CARE
[facility] Care Plan  POC reviewed with Haydee Linda and family at 1400. Patient and Family verbalized understanding. Questions and concerns addressed. No acute events today. Pt progressing toward goals. Will continue to monitor. See below and flowsheets for full assessment and VS info.             Is this a stroke patient? no    Neuro:  Herald Coma Scale  Best Eye Response: 3-->(E3) to speech  Best Motor Response: 6-->(M6) obeys commands  Best Verbal Response: 5-->(V5) oriented  Coral Coma Scale Score: 14  Assessment Qualifiers: no eye obstruction present  Pupil PERRLA: yes     24 hr Temp:  [96.1 °F (35.6 °C)-98.3 °F (36.8 °C)]     CV:   Rhythm: normal sinus rhythm  BP goals:   SBP < 140  MAP > 65    Resp:           Plan: N/A    GI/:     Diet/Nutrition Received: NPO  Last Bowel Movement: 07/08/24  Voiding Characteristics: ureteral catheter    Intake/Output Summary (Last 24 hours) at 7/10/2024 1850  Last data filed at 7/10/2024 1837  Gross per 24 hour   Intake 1920 ml   Output 3990 ml   Net -2070 ml          Labs/Accuchecks:  Recent Labs   Lab 07/10/24  1513   WBC 9.86   RBC 3.34*   HGB 9.9*   HCT 30.3*         Recent Labs   Lab 07/10/24  0152 07/10/24  1513   * 132*   K 4.8 4.3   CO2 22* 21*    103   BUN 14 9   CREATININE 0.9 0.8   ALKPHOS 99  --    ALT 20  --    AST 17  --    BILITOT 0.3  --       Recent Labs   Lab 07/09/24  1243 07/10/24  0655   INR 0.9  --    APTT  --  30.8      Recent Labs   Lab 07/10/24  0152   TROPONINI <0.006       Electrolytes: N/A - electrolytes WDL  Accuchecks: none    Gtts:   0.9% NaCl   Intravenous Continuous 100 mL/hr at 07/10/24 1837 New Bag at 07/10/24 1837    nicardipine  0-15 mg/hr Intravenous Continuous   Held at 07/10/24 1845       LDA/Wounds:      Nurses Note -- 4 Eyes   Is there altered skin present? no    Second RN/Staff Member:  Areli      Restraints:        SHAINA

## 2024-07-10 NOTE — INTERVAL H&P NOTE
The patient has been examined and the H&P has been reviewed:    I concur with the findings and no changes have occurred since H&P was written.        Active Hospital Problems    Diagnosis  POA    *AVM (arteriovenous malformation) brain [Q28.2]  Not Applicable    Primary hypertension [I10]  Yes    Refractory epilepsy [G40.919]  Yes    History of spontaneous intraparenchymal intracranial hemorrhage [Z86.79]  Not Applicable    Secondary seizure disorder [G40.909]  Yes    Congestive heart failure [I50.9]  Yes      Resolved Hospital Problems   No resolved problems to display.

## 2024-07-10 NOTE — HOSPITAL COURSE
07/10/2024 OR for R temporal lobectomy  7/11/2024-troponin normal today, add regular diet, d/c bates and a-line, pt/ot initiated   07/12/2024 Reported some chest pain overnight. EKG w/ no changes, troponin normal. Per NSGY, would like to keep in NCC one more night. Can likely stepdown tomorrow if no further issues.

## 2024-07-10 NOTE — ANESTHESIA PROCEDURE NOTES
Intubation    Date/Time: 7/10/2024 8:37 AM    Performed by: Donavan Hoffman MD  Authorized by: Luís Simmons MD    Intubation:     Induction:  Intravenous    Intubated:  Postinduction    Mask Ventilation:  Easy with oral airway    Attempts:  1    Attempted By:  Resident anesthesiologist    Method of Intubation:  Video laryngoscopy    Blade:  Bejarano 3    Laryngeal View Grade: Grade I - full view of cords      Difficult Airway Encountered?: No      Complications:  None    Airway Device:  Oral endotracheal tube    Airway Device Size:  7.0    Style/Cuff Inflation:  Cuffed (inflated to minimal occlusive pressure)    Tube secured:  21    Secured at:  The teeth    Placement Verified By:  Capnometry    Complicating Factors:  None    Findings Post-Intubation:  BS equal bilateral and atraumatic/condition of teeth unchanged

## 2024-07-10 NOTE — BRIEF OP NOTE
[facility]  Brief Operative Note    SUMMARY     Surgery Date: 7/10/2024     Surgeons and Role:     * Lynda Patel MD - Primary     * Francisco Scruggs MD - Resident - Assisting     * Bill Bo MD        Pre-op Diagnosis:  Refractory epilepsy [G40.919]  Arteriovenous malformation of cerebral vessels [Q28.2]    Post-op Diagnosis:  Post-Op Diagnosis Codes:     * Refractory epilepsy [G40.919]     * Arteriovenous malformation of cerebral vessels [Q28.2]    Procedure(s) (LRB):  ANTERIOR TEMPORAL LOBECTOMY AND TEMPORAL AVM CRANIOTOMY, USING FRAMELESS STEREOTAXY (Right)    Anesthesia: General    Implants:  Implant Name Type Inv. Item Serial No.  Lot No. LRB No. Used Action   CLIP MINI TI PERM STR 3MM - [id number]  CLIP MINI TI PERM STR 3MM  AESCULAP  Right 1 Implanted   PLATE BONE 2X2 HOLE SM BOX - [id number]  PLATE BONE 2X2 HOLE SM BOX  [employer]  Right 2 Implanted   PLATE CRANIAL UN3 BOX LG 2X2 - [id number]  PLATE CRANIAL UN3 BOX LG 2X2  [employer]  Right 1 Implanted   SCREW UN3 AXS SD 1.5X4MM - [id number]  SCREW UN3 AXS SD 1.5X4MM  [employer]  Right 14 Implanted       Operative Findings: See full op note    Estimated Blood Loss: * No values recorded between 7/10/2024  9:22 AM and 7/10/2024  2:32 PM *    Estimated Blood Loss has not been documented. EBL = 75cc.         Specimens:   Specimen (24h ago, onward)       Start     Ordered    07/10/24 1301  Specimen to Pathology, Surgery Neurosurgery  Once        Comments: Pre-op Diagnosis: Refractory epilepsy [G40.919]Arteriovenous malformation of cerebral vessels [Q28.2]Procedure(s):ANTERIOR TEMPORAL LOBECTOMY AND TEMPORAL AVM CRANIOTOMY, USING FRAMELESS STEREOTAXY Number of specimens: 3Name of specimens: 1. AVM - Permanent2. LATERAL TEMPORAL LOBE - Permanent3. MESIAL TEMPORAL STRUCTURE - Permanent     References:    Click here for ordering Quick Tip   Question Answer Comment   Procedure Type: Neurosurgery     Specimen Class: Complex case/Special    Release to patient Immediate        07/10/24 1302                    [id number]

## 2024-07-10 NOTE — HOSPITAL COURSE
7/10>7/11: S/p right frontotemporal craniotomy for resection of AVM by Dr. Bo and right temporal lobectomy by Dr. Lynda Patel on 7/10. Epilepsy following for assistance in management. Recommend to continue outpatient AED regimen: Clobazam 40 mg qhs, Levetiracetam 2g BID, and Oxcarbazepine 300 mg BID.  7/11>7/12: Patient doing well postoperatively. No clinical seizures. Recommend to continue AED regimen.    7/14>7/15: Recommend to continue AED regimen.

## 2024-07-10 NOTE — TRANSFER OF CARE
"Anesthesia Transfer of Care Note    Patient: Haydee Linda    Procedure(s) Performed: Procedure(s) (LRB):  ANTERIOR TEMPORAL LOBECTOMY AND TEMPORAL AVM CRANIOTOMY, USING FRAMELESS STEREOTAXY (Right)    Patient location: ICU    Anesthesia Type: general    Transport from OR: Transported from OR on 6-10 L/min O2 by face mask with adequate spontaneous ventilation    Post pain: adequate analgesia    Post assessment: no apparent anesthetic complications    Post vital signs: stable    Level of consciousness: awake and alert    Nausea/Vomiting: no nausea/vomiting    Complications: none    Transfer of care protocol was followed      Last vitals: Visit Vitals  BP 98/68 (BP Location: Right arm, Patient Position: Lying)   Pulse 90   Temp 36.4 °C (97.6 °F) (Oral)   Resp 12   Ht 5' 8" (1.727 m)   Wt 75.8 kg (167 lb 1.7 oz)   SpO2 100%   Breastfeeding No   BMI 25.41 kg/m²     "

## 2024-07-10 NOTE — ASSESSMENT & PLAN NOTE
- resume losartan  - resume nifedipine  SBP < 140 in post operative setting  PRN hydralazine and labetalol

## 2024-07-10 NOTE — OP NOTE
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 Bo)   Right temporal lobectomy   Use of microscopic technique   Use of neuronavigation    Indications:   Haydee Linda is a 68 y.o. female who presents as a referral from Dr. Gamez for medication refractory epilepsy. 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      The patient has been discussed at length in interdisciplinary conference and recommended for skip procedure (right anterior temporal lobectomy), particularly in light of the AVM.  She was again discussed with Dr. Mead, who agrees with the management plan.      I have reviewed the case with my cerebrovascular colleague, Dr. SANDEEP Bo, and we are planning for combined surgical approach.      Risks include, but are not limited to, bleeding, pain, infection, scarring, need for further/repeat procedure, death, paralysis, stroke/damage to major blood vessels, leak of cerebrospinal fluid, spatial memory, speech changes, and hardware-related complications. Seizure freedom cannot be guaranteed. Informed consent was obtained of the patient with her  present.      Description  of the Procedure:   The patient was brought back to the operating room, and general endotracheal anesthesia was induced. An arterial line was placed, and instructions were given to keep SBP <140 throughout the case. She was carefully positioned supine with a large bump under her right shoulder to allow the right mastoid tip to be the highest point in the field. Her head was affixed to the table with a Pineda 3-point head clamp. She was registered to the neuronavigation system with appropriate accuracy.      A modified reverse-question-rose mary incision was designed from the root of the zygoma to several centimeters above the keyhole, posterior to the hairline. A time out was performed. The incision was injected with 10 cc of 1% lidocaine with epinephrine. The patient received 2 g ceftriaxone, 12 of dexamethasone, and two grams of Keppra. Incision was made with a 15 blade, and dissection was carried down with Bovie cautery. Bipolar at 20 was used for hemostasis where indicated. A myocutaneous flap was elevated. The root of the zygoma was again identified.      We designed bur holes just above the root of the zygoma and also on the zygoamtic process of the frontal bone, plus one posteriorly. These were connected with the craniotome and the M8 where needed over the sphenoid ridge. The bone flap was elevated with the Penfield #3. The sphenoid ridge was drilled down with the M8 and liberally waxed. We also performed a subtemporal decompression. Tack-up stitches were placed.      Once the bony work was completed, we opened the dura in a c-shape, flapping it down over the temporalis. At this point Dr. Bo performed the AVM resection, which is dictated separately by him.     We then returned our attention to the temporal lobectomy. Care was taken to measure 4cm from the temporal tip. A cortisectomy was performed. The neuronavigation wand was used to confirm the location of the temporal horn, towards which we oriented our  trajectory. We worked medially until reaching the temporal horn. We placed a micro-cottonoid in the horn and then worked anteriorly, toward the temporal tip. We resected the lateral neocortex and sent it for permanent evaluation.      Next, we identified the choroid plexus before bringing in the microscope. With subpial technique, we resected the amygdala and hippocampus, together with the parahippocampal gyrus. We worked carefully to perform adequate subpial dissection. Care was taken to minimize use of cautery around the tentorium.      We identified the tentorial edge, the third nerve, and the PCA as the tail of the hippocampus was curving around the peduncle.      Once we were satisfied that we were sufficiently posterior, we worked to obtain meticulous hemostasis. The cavity was lined with Surgicel. The cavity was dry prior to its being filled with liquid thrombin.      We closed the dura with running 4.0 nurulon stitches. We placed a gel foam onlay over the dura. The bone was reaffixed with titanium plates and screws.      We reapproximated the temporalis and its fascia with 3.0 Vicryl stitches. A subgaleal hemovac was placed. The galea was closed with 3.0 inverted Vicryl stitches. The skin was closed with staples.     The patient was carefully removed from pins, and a sterile dressing of bacitracin ointment, Telfa, and Medipore tape with mastisol spray was applied.      The patient was then awakened by the anesthesia service and taken to neuro ICU in satisfactory condition.      Dr. Bo served as primary surgeon for the AVM portion of the procedure, and I served as primary surgeon for the temporal lobectomy. The resident is alejandra and was present mostly for observational purposes.      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 AVM - Permanent2. LATERAL TEMPORAL LOBE - Permanent3. MESIAL TEMPORAL STRUCTURE - Permanent     References:    Click here for ordering Quick Tip   Question Answer Comment   Procedure Type: Neurosurgery    Specimen Class: Complex case/Special    Release to patient Immediate        07/10/24 1302                     Implants:   Implant Name Type Inv. Item Serial No.  Lot No. LRB No. Used Action   PINS SKULL ADULT [place] - [id number]  PINS SKULL ADULT [place]  [id number] Right 1 Implanted and Explanted   CLIP YASARGIL ANEURYSM 5X4MM - [id number]  CLIP YASARGIL ANEURYSM 5X4MM  AESCULAP  Right 1 Implanted and Explanted   CLIP YASARGIL ANEURYSM 5X4MM - [id number]  CLIP YASARGIL ANEURYSM 5X4MM  AESCULAP  Right 1 Implanted and Explanted   CLIP MINI TI PERM STR 3MM - [id number]  CLIP MINI TI PERM STR 3MM  AESCULAP  Right 1 Implanted   CLIP MINI TI PERM STR 3MM - [id number]  CLIP MINI TI PERM STR 3MM  AESCULAP  Right 1 Implanted and Explanted   CLIP MINI TI PERM STR 3MM - [id number]  CLIP MINI TI PERM STR 3MM  AESCULAP  Right 1 Implanted and Explanted   PLATE BONE 2X2 HOLE SM BOX - [id number]  PLATE BONE 2X2 HOLE SM BOX  [employer]  Right 2 Implanted   PLATE CRANIAL UN3 BOX LG 2X2 - [id number]  PLATE CRANIAL UN3 BOX LG 2X2  [employer]  Right 1 Implanted   SCREW UN3 AXS SD 1.5X4MM - [id number]  SCREW UN3 AXS SD 1.5X4MM  [employer].  Right 14 Implanted              Condition: Stable    Disposition: ICU - extubated and stable.    Attestation: I was present and scrubbed for the entire procedure.

## 2024-07-10 NOTE — NURSING
Pt returned to room accompanied per surgery  team form OR s/p Right anterior temporal lobectomy and temporal Avm craniotomy with 10 fr accordion drain to full suction.Pt mildly sedated and easy to arouse.Alyce,NP at bedside for handoff with Anesthesia. at bedside.no acute distress noted.head ct pending.

## 2024-07-10 NOTE — NURSING
Surgery team transported pt to OR via bed connected to cardiac monitor without noted distress. in room.

## 2024-07-10 NOTE — PLAN OF CARE
[facility] - Neuro Critical Care  Initial Discharge Assessment       Primary Care Provider: No, Primary Doctor    Admission Diagnosis: Arteriovenous malformation (AVM) [Q27.30]    Admission Date: 7/9/2024  Expected Discharge Date: 7/12/2024    Transition of Care Barriers: None    Payor: MEDICARE / Plan: MEDICARE PART A & B / Product Type: Government /     Extended Emergency Contact Information  Primary Emergency Contact: CieloFigueroa clarke  Address: [address] [address]           [place] [address] [facility]  Home Phone: [phone]  Work Phone: [phone]  Mobile Phone: [phone]  Relation: Significant other  Secondary Emergency Contact: Maddie Sousa  Address: [address], [place] [address] [place] [address]  Home Phone: [phone]  Mobile Phone: [phone]  Relation: Daughter    Discharge Plan A: Home with family, Home Health  Discharge Plan B: Home      [id number] [address] [address]  Phone: [phone] Fax: [phone]      Initial Assessment (most recent)       Adult Discharge Assessment - 07/10/24 1447          Discharge Assessment    Assessment Type Discharge Planning Assessment     Confirmed/corrected address, phone number and insurance Yes     Confirmed Demographics Correct on Facesheet     Source of Information family     Communicated JUDSON with patient/caregiver Yes     Reason For Admission AVM (arteriovenous malformation) brain     People in Home significant other     Do you expect to return to your current living situation? Yes     Do you have help at home or someone to help you manage your care at home? Yes     Who are your caregiver(s) and their phone number(s)? Figueroa Rodriguez [phone]     Prior to hospitilization cognitive status: Alert/Oriented     Current cognitive status: Alert/Oriented     Walking or Climbing Stairs Difficulty yes     Walking or Climbing Stairs ambulation difficulty,  assistance 1 person     Mobility Management assistance walking up stairs     Dressing/Bathing Difficulty yes     Dressing/Bathing bathing difficulty, assistance 1 person     Dressing/Bathing Management Need help to soak in tub but can bathe herself     Home Layout Able to live on 1st floor     Equipment Currently Used at Home none     Readmission within 30 days? No     Patient currently being followed by outpatient case management? No     Do you currently have service(s) that help you manage your care at home? No     Do you take prescription medications? Yes     Do you have prescription coverage? Yes     Coverage Payor: MEDICARE - MEDICARE PART A & B -     Do you have any problems affording any of your prescribed medications? No     Is the patient taking medications as prescribed? yes     Who is going to help you get home at discharge? SO Figueroa Buck     How do you get to doctors appointments? family or friend will provide     Are you on dialysis? No     Do you take coumadin? No     Discharge Plan A Home with family;Home Health     Discharge Plan B Home     DME Needed Upon Discharge  none     Discharge Plan discussed with: Friend     Name(s) and Number(s) Figueroa Buck [phone]     Transition of Care Barriers None        Physical Activity    On average, how many days per week do you engage in moderate to strenuous exercise (like a brisk walk)? 0 days     On average, how many minutes do you engage in exercise at this level? 0 min        Financial Resource Strain    How hard is it for you to pay for the very basics like food, housing, medical care, and heating? Not hard at all        Housing Stability    In the last 12 months, was there a time when you were not able to pay the mortgage or rent on time? No     At any time in the past 12 months, were you homeless or living in a shelter (including now)? No        Transportation Needs    Has the lack of transportation kept you from medical appointments, meetings,  work or from getting things needed for daily living? No        Food Insecurity    Within the past 12 months, you worried that your food would run out before you got the money to buy more. Never true     Within the past 12 months, the food you bought just didn't last and you didn't have money to get more. Never true        Stress    Do you feel stress - tense, restless, nervous, or anxious, or unable to sleep at night because your mind is troubled all the time - these days? Not at all        Social Isolation    How often do you feel lonely or isolated from those around you?  Never        Alcohol Use    Q1: How often do you have a drink containing alcohol? Patient unable to answer     Q2: How many drinks containing alcohol do you have on a typical day when you are drinking? Patient unable to answer     Q3: How often do you have six or more drinks on one occasion? Patient unable to answer        Utilities    In the past 12 months has the electric, gas, oil, or water company threatened to shut off services in your home? No        Health Literacy    How often do you need to have someone help you when you read instructions, pamphlets, or other written material from your doctor or pharmacy? Never                      The SW spoke with pt SO Figueroa Cielo [phone].  SW  obtained information abut pt at baseline.  Use preferred pharmacy / bedside delivery for any necessary medications at the time of discharge. The patient is not independent with all ADLs and needs assistance with getting into the tub and climbing stairs.   The patient is not on Dialysis or Coumadin. The Patient does not use DME or home oxygen, The patient's LISA Marti will provide assistance to the patient upon discharge. The patient's LISA Marti  will provide transportation upon discharge. SW will continue to follow for course of hospitalization.       Discharge Plan A and Plan B have been determined by review of patient's clinical status, future medical  and therapeutic needs, and coverage/benefits for post-acute care in coordination with multidisciplinary team members.    Carey Mckenzie MSW, EMILYW  [facility]  Case Management Dept.

## 2024-07-10 NOTE — ASSESSMENT & PLAN NOTE
- SM1 right temporal AVM s/p angiogram with kd embolization by IR on 7/9  - S/p right frontotemporal craniotomy for resection of AVM by Dr. Bo and right temporal lobectomy by Dr. Lynda Patel on 7/10  - CTH and MRI pending  - Management per NSGY, NCC

## 2024-07-10 NOTE — HPI
69 year old female with a PMHx of HFrEF and right temporal intraparenchymal hemorrhage complicated by medically refractory epilepsy currently maintained on Clobazam 40 mg qhs, Levetiracetam 2g BID, and Oxcarbazepine 300 mg BID now managed by Dr. Mead, found to have SM1 right temporal AVM admitted to [facility] s/p angiogram with kd embolization by IR on 7/9. Patient is now s/p right frontotemporal craniotomy for resection of AVM by Dr. Bo and right temporal lobectomy by Dr. Lynda Patel on 7/10 and admitted to [facility] postoperatively. Epilepsy following for assistance in management.

## 2024-07-10 NOTE — ASSESSMENT & PLAN NOTE
Patient reported HFrEF  [facility] 8/2022: The left main is angiographically normal. The left anterior descending is angiographically normal. The circumflex is angiographically normal. The right coronary artery is a dominant vessel of the heart and angiographically normal. Left Ventriculography a single PATRICK ventriculogram was performed revealing an EF of 55-60%.  - Management per NCC

## 2024-07-10 NOTE — PLAN OF CARE
The patient was seen at bedside following the procedure. The patient was still waking up from anesthesia but cranial nerves are grossly intact. pain was reasonably controlled in the post-operative period, and all questions and/or complaints were directly addressed at bedside by the NSGY resident. All post-operative management orders have been placed and the patient was signed out to [facility]       Waking from anesthesia  PERRL  Motor: Moves RUE, RLE & LLE briskly  Sensory: intact to light touch throughout  Cranial dressing in place      Francisco Scruggs MD  Neurosurgery  [facility]

## 2024-07-10 NOTE — ASSESSMENT & PLAN NOTE
- Remote histroy of right temporal intraparenchymal hemorrhage complicated by medically refractory epilepsy   - Management per NCC

## 2024-07-10 NOTE — ASSESSMENT & PLAN NOTE
Secondary seizure disorder  69F PMHx of HFrEF and right temporal intraparenchymal hemorrhage complicated by medically refractory epilepsy currently maintained on Clobazam 40 mg qhs, Levetiracetam 2g BID, and Oxcarbazepine 300 mg BID now managed by Dr. 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 Now s/p right frontotemporal craniotomy for resection of AVM by Dr. Bo and right temporal lobectomy by Dr. Lynda Patel on 7/10. Epilepsy following for assistance in management.    Recommendations:  - Recommend to continue outpatient AED regimen: Clobazam 40 mg qhs, Levetiracetam 2g BID, and Oxcarbazepine 300 mg BID  - Avoid agents that lower seizure threshold  - Postoperative management per NSGY  - Management of infectious/metabolic abnormalities per NCC  - Seizure precautions    Discussed plan of care with NCC team. Will follow, please call with questions.

## 2024-07-10 NOTE — CONSULTS
[facility] - Neuro Critical Care  Neurology-Epilepsy  Consult Note    Patient Name: Haydee Linda  MRN: [id number]  Admission Date: 7/9/2024  Hospital Length of Stay: 1 days  Code Status: Full Code   Attending Provider: Moe Phillips MD   Consulting Provider: Kirsten Dallas PA-C  Primary Care Physician: Tamy, Primary Doctor  Principal Problem:Refractory epilepsy    Consults   Subjective:     HPI:   69 year old female with a PMHx of HFrEF and right temporal intraparenchymal hemorrhage complicated by medically refractory epilepsy currently maintained on Clobazam 40 mg qhs, Levetiracetam 2g BID, and Oxcarbazepine 300 mg BID now managed by Dr. 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 Patient is now s/p right frontotemporal craniotomy for resection of AVM by Dr. Bo and right temporal lobectomy by Dr. Lynda Patel on 7/10 and admitted to [facility] postoperatively. Epilepsy following for assistance in management.     Hospital Course:   7/10: Admitted to [facility] s/p angiogram with kd embolization by IR on 7/9. Now s/p right frontotemporal craniotomy for resection of AVM by Dr. Bo and right temporal lobectomy by Dr. Lynda Patel on 7/10. Epilepsy following for assistance in management. Recommend to continue outpatient AED regimen: Clobazam 40 mg qhs, Levetiracetam 2g BID, and Oxcarbazepine 300 mg BID.     Past Medical History:   Diagnosis Date    CHF (congestive heart failure)     Coronary artery disease     Hypertension     Seizures     ST elevation (STEMI) myocardial infarction of unspecified site     2022    Stroke     intracranial hemorrhage       Past Surgical History:   Procedure Laterality Date    BREAST SURGERY      Cyst , enhancement - 1984    cyst removal of neck      Benign- Lymphnode-    Dental, Jaw surgery- 2023      TONSILLECTOMY      Tonsillectomy and adenoidectomy at age 7 years       Review of patient's allergies indicates:   Allergen Reactions     Opioids - morphine analogues Hallucinations     Can tolerate Codeine, Dilaudid, oxycodone, hydrocodone       No current facility-administered medications on file prior to encounter.     Current Outpatient Medications on File Prior to Encounter   Medication Sig    acetaminophen (TYLENOL) 500 MG tablet Take 1,000 mg by mouth daily as needed for Pain.    bacitracin 500 unit/gram ointment Apply topically 2 (two) times daily. To inside of nasal passages    cloBAZam (ONFI) 20 mg Tab Take 2 tablets (40 mg total) by mouth every evening.    docusate sodium (COLACE) 100 MG capsule Take 100 mg by mouth 2 (two) times daily.    levETIRAcetam (KEPPRA) 1000 MG tablet Take 2 tablets (2,000 mg total) by mouth 2 (two) times daily.    losartan (COZAAR) 50 MG tablet Take 50 mg by mouth once daily.    magnesium gluconate 27.5 mg magne- sium (500 mg) Tab Take 500 mg by mouth 2 (two) times daily.    methyl salicylate/menthol (ICY HOT TOP) Apply topically. With lidocaine    multivitamin (THERAGRAN) per tablet Take 1 tablet by mouth once daily. With vegetables    NIFEdipine (PROCARDIA-XL) 30 MG (OSM) 24 hr tablet Take 30 mg by mouth 2 (two) times a day.    OXcarbazepine (TRILEPTAL) 300 MG Tab Take 1 tablet (300 mg total) by mouth 2 (two) times daily. (Patient taking differently: Take by mouth 2 (two) times daily.)    potassium chloride 10% (KAYCIEL) 20 mEq/15 mL oral solution Take 40 mEq by mouth 2 (two) times daily. She has not taking liquid potassium and is taking 40 mEq by mouth once a day in a pill form    torsemide (DEMADEX) 20 MG Tab Take 40 mg by mouth once daily.    UNABLE TO FIND medication name: SUPER BEET    midazolam (NAYZILAM) 5 mg/spray (0.1 mL) Spry 1 spray by Nasal route every 10 (ten) minutes as needed (cluster seizures). 1 spray for seizures more than 3min or more than 3 seizures in 24 hours. May give second dose after 10 min if seizures persist. (Patient not taking: Reported on 6/25/2024)     Continuous  Infusions:    Family History       Problem Relation (Age of Onset)    Alcohol abuse Brother          Tobacco Use    Smoking status: Former     Types: Cigarettes    Smokeless tobacco: Never    Tobacco comments:     Quit 1999   Substance and Sexual Activity    Alcohol use: Not Currently    Drug use: Never    Sexual activity: Not on file     Review of Systems  Objective:     Vital Signs (Most Recent):  Temp: 97.6 °F (36.4 °C) (07/10/24 1445)  Pulse: 90 (07/10/24 1445)  Resp: 12 (07/10/24 1445)  BP: 98/68 (07/10/24 0700)  SpO2: 100 % (07/10/24 1445) Vital Signs (24h Range):  Temp:  [94.1 °F (34.5 °C)-98.3 °F (36.8 °C)] 97.6 °F (36.4 °C)  Pulse:  [64-90] 90  Resp:  [10-26] 12  SpO2:  [95 %-100 %] 100 %  BP: ()/(54-83) 98/68  Arterial Line BP: (103-141)/(54-73) 123/64     Weight: 75.8 kg (167 lb 1.7 oz)  Body mass index is 25.41 kg/m².     Physical Exam  Constitutional:       General: She is not in acute distress.     Appearance: She is not diaphoretic.   HENT:      Head:      Comments: Dressing intact  Eyes:      General: No scleral icterus.        Right eye: No discharge.         Left eye: No discharge.      Conjunctiva/sclera: Conjunctivae normal.      Pupils: Pupils are equal, round, and reactive to light.   Cardiovascular:      Rate and Rhythm: Normal rate.   Pulmonary:      Effort: Pulmonary effort is normal. No respiratory distress.   Musculoskeletal:         General: No deformity or signs of injury.   Skin:     General: Skin is warm and dry.   Psychiatric:      Comments: Unable to assess            NEUROLOGICAL EXAMINATION:     CRANIAL NERVES     CN III, IV, VI   Pupils are equal, round, and reactive to light.       Evaluated after returning from OR:  Arouses to stimuli  ANDERS OU   Corneal intact OU   No spontaneous eye opening   Face symmetric   Tongue midline   Spontaneous movements of all extremities   Does not follow commands    Exam findings to suggest seizures:  Myoclonus - no   eye twitching - no    Nystagmus - no   gaze deviation - no   waxy rigidity - no        Significant Labs: All pertinent lab results from the past 24 hours have been reviewed.    Significant Studies: I have reviewed all pertinent imaging results/findings within the past 24 hours.  Assessment and Plan:     * Refractory epilepsy  Secondary seizure disorder  69F PMHx of HFrEF and right temporal intraparenchymal hemorrhage complicated by medically refractory epilepsy currently maintained on Clobazam 40 mg qhs, Levetiracetam 2g BID, and Oxcarbazepine 300 mg BID now managed by Dr. 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 s/p angiogram with kd embolization by IR on 7/9. Now s/p right frontotemporal craniotomy for resection of AVM by Dr. Bo and right temporal lobectomy by Dr. Lynda Patel on 7/10. Epilepsy following for assistance in management.    Recommendations:  - Recommend to continue outpatient AED regimen: Clobazam 40 mg qhs, Levetiracetam 2g BID, and Oxcarbazepine 300 mg BID  - Avoid agents that lower seizure threshold  - Postoperative management per NSGY  - Management of infectious/metabolic abnormalities per NCC  - Seizure precautions    Discussed plan of care with NCC team. Will follow, please call with questions.    AVM (arteriovenous malformation) brain  - SM1 right temporal AVM s/p angiogram with kd embolization by IR on 7/9  - S/p right frontotemporal craniotomy for resection of AVM by Dr. Bo and right temporal lobectomy by Dr. Lynda Patel on 7/10  - CTH and MRI pending  - Management per NSGY, NCC    History of spontaneous intraparenchymal intracranial hemorrhage  - Remote histroy of right temporal intraparenchymal hemorrhage complicated by medically refractory epilepsy   - Management per NCC    Congestive heart failure  Patient reported HFrEF  [facility] 8/2022: The left main is angiographically normal. The left anterior descending is angiographically normal. The circumflex is angiographically normal. The right  coronary artery is a dominant vessel of the heart and angiographically normal. Left Ventriculography a single PATRICK ventriculogram was performed revealing an EF of 55-60%.  - Management per [facility]         VTE Risk Mitigation (From admission, onward)           Ordered     heparin (porcine) injection 5,000 Units  Every 8 hours         07/10/24 1453     IP VTE HIGH RISK PATIENT  Once         07/10/24 1453     Place sequential compression device  Until discontinued         07/10/24 1453                    Thank you for your consult. I will follow-up with patient. Please contact us if you have any additional questions.    Kirsten Dallas PA-C  Neurology-Epilepsy  [facility] - [facility]  Staff: Dr. Mead

## 2024-07-10 NOTE — SUBJECTIVE & OBJECTIVE
Past Medical History:   Diagnosis Date    CHF (congestive heart failure)     Coronary artery disease     Hypertension     Seizures     ST elevation (STEMI) myocardial infarction of unspecified site     2022    Stroke     intracranial hemorrhage       Past Surgical History:   Procedure Laterality Date    BREAST SURGERY      Cyst , enhancement - 1984    cyst removal of neck      Benign- Lymphnode-    Dental, Jaw surgery- 2023      TONSILLECTOMY      Tonsillectomy and adenoidectomy at age 7 years       Review of patient's allergies indicates:   Allergen Reactions    Opioids - morphine analogues Hallucinations     Can tolerate Codeine, Dilaudid, oxycodone, hydrocodone       No current facility-administered medications on file prior to encounter.     Current Outpatient Medications on File Prior to Encounter   Medication Sig    acetaminophen (TYLENOL) 500 MG tablet Take 1,000 mg by mouth daily as needed for Pain.    bacitracin 500 unit/gram ointment Apply topically 2 (two) times daily. To inside of nasal passages    cloBAZam (ONFI) 20 mg Tab Take 2 tablets (40 mg total) by mouth every evening.    docusate sodium (COLACE) 100 MG capsule Take 100 mg by mouth 2 (two) times daily.    levETIRAcetam (KEPPRA) 1000 MG tablet Take 2 tablets (2,000 mg total) by mouth 2 (two) times daily.    losartan (COZAAR) 50 MG tablet Take 50 mg by mouth once daily.    magnesium gluconate 27.5 mg magne- sium (500 mg) Tab Take 500 mg by mouth 2 (two) times daily.    methyl salicylate/menthol (ICY HOT TOP) Apply topically. With lidocaine    multivitamin (THERAGRAN) per tablet Take 1 tablet by mouth once daily. With vegetables    NIFEdipine (PROCARDIA-XL) 30 MG (OSM) 24 hr tablet Take 30 mg by mouth 2 (two) times a day.    OXcarbazepine (TRILEPTAL) 300 MG Tab Take 1 tablet (300 mg total) by mouth 2 (two) times daily. (Patient taking differently: Take by mouth 2 (two) times daily.)    potassium chloride 10% (KAYCIEL) 20 mEq/15 mL oral solution Take  40 mEq by mouth 2 (two) times daily. She has not taking liquid potassium and is taking 40 mEq by mouth once a day in a pill form    torsemide (DEMADEX) 20 MG Tab Take 40 mg by mouth once daily.    UNABLE TO FIND medication name: SUPER BEET    midazolam (NAYZILAM) 5 mg/spray (0.1 mL) Spry 1 spray by Nasal route every 10 (ten) minutes as needed (cluster seizures). 1 spray for seizures more than 3min or more than 3 seizures in 24 hours. May give second dose after 10 min if seizures persist. (Patient not taking: Reported on 6/25/2024)     Continuous Infusions:    Family History       Problem Relation (Age of Onset)    Alcohol abuse Brother          Tobacco Use    Smoking status: Former     Types: Cigarettes    Smokeless tobacco: Never    Tobacco comments:     Quit 1999   Substance and Sexual Activity    Alcohol use: Not Currently    Drug use: Never    Sexual activity: Not on file     Review of Systems  Objective:     Vital Signs (Most Recent):  Temp: 97.6 °F (36.4 °C) (07/10/24 1445)  Pulse: 90 (07/10/24 1445)  Resp: 12 (07/10/24 1445)  BP: 98/68 (07/10/24 0700)  SpO2: 100 % (07/10/24 1445) Vital Signs (24h Range):  Temp:  [94.1 °F (34.5 °C)-98.3 °F (36.8 °C)] 97.6 °F (36.4 °C)  Pulse:  [64-90] 90  Resp:  [10-26] 12  SpO2:  [95 %-100 %] 100 %  BP: ()/(54-83) 98/68  Arterial Line BP: (103-141)/(54-73) 123/64     Weight: 75.8 kg (167 lb 1.7 oz)  Body mass index is 25.41 kg/m².     Physical Exam  Constitutional:       General: She is not in acute distress.     Appearance: She is not diaphoretic.   HENT:      Head:      Comments: Dressing intact  Eyes:      General: No scleral icterus.        Right eye: No discharge.         Left eye: No discharge.      Conjunctiva/sclera: Conjunctivae normal.      Pupils: Pupils are equal, round, and reactive to light.   Cardiovascular:      Rate and Rhythm: Normal rate.   Pulmonary:      Effort: Pulmonary effort is normal. No respiratory distress.   Musculoskeletal:         General:  No deformity or signs of injury.   Skin:     General: Skin is warm and dry.   Psychiatric:      Comments: Unable to assess            NEUROLOGICAL EXAMINATION:     CRANIAL NERVES     CN III, IV, VI   Pupils are equal, round, and reactive to light.       Evaluated after returning from OR:  Arouses to stimuli  ANDERS OU   Corneal intact OU   No spontaneous eye opening   Face symmetric   Tongue midline   Spontaneous movements of all extremities anti-gravity  Does not follow commands    Exam findings to suggest seizures:  Myoclonus - no   eye twitching - no   Nystagmus - no   gaze deviation - no   waxy rigidity - no        Significant Labs: All pertinent lab results from the past 24 hours have been reviewed.    Significant Studies: I have reviewed all pertinent imaging results/findings within the past 24 hours.

## 2024-07-11 LAB
ALBUMIN SERPL BCP-MCNC: 3.6 G/DL (ref 3.5–5.2)
ALP SERPL-CCNC: 90 U/L (ref 55–135)
ALT SERPL W/O P-5'-P-CCNC: 19 U/L (ref 10–44)
ANION GAP SERPL CALC-SCNC: 7 MMOL/L (ref 8–16)
AST SERPL-CCNC: 21 U/L (ref 10–40)
BASOPHILS # BLD AUTO: 0.01 K/UL (ref 0–0.2)
BASOPHILS NFR BLD: 0.1 % (ref 0–1.9)
BILIRUB SERPL-MCNC: 0.3 MG/DL (ref 0.1–1)
BUN SERPL-MCNC: 10 MG/DL (ref 8–23)
CALCIUM SERPL-MCNC: 9.1 MG/DL (ref 8.7–10.5)
CHLORIDE SERPL-SCNC: 104 MMOL/L (ref 95–110)
CO2 SERPL-SCNC: 22 MMOL/L (ref 23–29)
CREAT SERPL-MCNC: 0.8 MG/DL (ref 0.5–1.4)
DIFFERENTIAL METHOD BLD: ABNORMAL
EOSINOPHIL # BLD AUTO: 0 K/UL (ref 0–0.5)
EOSINOPHIL NFR BLD: 0 % (ref 0–8)
ERYTHROCYTE [DISTWIDTH] IN BLOOD BY AUTOMATED COUNT: 12.6 % (ref 11.5–14.5)
EST. GFR  (NO RACE VARIABLE): >60 ML/MIN/1.73 M^2
GLUCOSE SERPL-MCNC: 114 MG/DL (ref 70–110)
HCT VFR BLD AUTO: 26.3 % (ref 37–48.5)
HGB BLD-MCNC: 8.6 G/DL (ref 12–16)
IMM GRANULOCYTES # BLD AUTO: 0.02 K/UL (ref 0–0.04)
IMM GRANULOCYTES NFR BLD AUTO: 0.2 % (ref 0–0.5)
LYMPHOCYTES # BLD AUTO: 1.2 K/UL (ref 1–4.8)
LYMPHOCYTES NFR BLD: 11.7 % (ref 18–48)
MAGNESIUM SERPL-MCNC: 2.1 MG/DL (ref 1.6–2.6)
MCH RBC QN AUTO: 29.8 PG (ref 27–31)
MCHC RBC AUTO-ENTMCNC: 32.7 G/DL (ref 32–36)
MCV RBC AUTO: 91 FL (ref 82–98)
MONOCYTES # BLD AUTO: 0.8 K/UL (ref 0.3–1)
MONOCYTES NFR BLD: 7.7 % (ref 4–15)
NEUTROPHILS # BLD AUTO: 8.2 K/UL (ref 1.8–7.7)
NEUTROPHILS NFR BLD: 80.3 % (ref 38–73)
NRBC BLD-RTO: 0 /100 WBC
OHS QRS DURATION: 86 MS
OHS QRS DURATION: 86 MS
OHS QTC CALCULATION: 426 MS
OHS QTC CALCULATION: 440 MS
PHOSPHATE SERPL-MCNC: 3.4 MG/DL (ref 2.7–4.5)
PLATELET # BLD AUTO: 216 K/UL (ref 150–450)
PMV BLD AUTO: 9.2 FL (ref 9.2–12.9)
POTASSIUM SERPL-SCNC: 4.3 MMOL/L (ref 3.5–5.1)
PROT SERPL-MCNC: 6.3 G/DL (ref 6–8.4)
RBC # BLD AUTO: 2.89 M/UL (ref 4–5.4)
SODIUM SERPL-SCNC: 133 MMOL/L (ref 136–145)
TROPONIN I SERPL DL<=0.01 NG/ML-MCNC: <0.006 NG/ML (ref 0–0.03)
WBC # BLD AUTO: 10.18 K/UL (ref 3.9–12.7)

## 2024-07-11 PROCEDURE — 93010 ELECTROCARDIOGRAM REPORT: CPT | Mod: ,,, | Performed by: INTERNAL MEDICINE

## 2024-07-11 PROCEDURE — 97112 NEUROMUSCULAR REEDUCATION: CPT

## 2024-07-11 PROCEDURE — 97535 SELF CARE MNGMENT TRAINING: CPT

## 2024-07-11 PROCEDURE — 25000003 PHARM REV CODE 250: Performed by: INTERNAL MEDICINE

## 2024-07-11 PROCEDURE — 97165 OT EVAL LOW COMPLEX 30 MIN: CPT

## 2024-07-11 PROCEDURE — 25000003 PHARM REV CODE 250: Performed by: NURSE PRACTITIONER

## 2024-07-11 PROCEDURE — 84100 ASSAY OF PHOSPHORUS: CPT | Performed by: NURSE PRACTITIONER

## 2024-07-11 PROCEDURE — 99291 CRITICAL CARE FIRST HOUR: CPT | Mod: FS,,, | Performed by: INTERNAL MEDICINE

## 2024-07-11 PROCEDURE — 20000000 HC ICU ROOM

## 2024-07-11 PROCEDURE — 84484 ASSAY OF TROPONIN QUANT: CPT

## 2024-07-11 PROCEDURE — 25000003 PHARM REV CODE 250: Performed by: STUDENT IN AN ORGANIZED HEALTH CARE EDUCATION/TRAINING PROGRAM

## 2024-07-11 PROCEDURE — 97162 PT EVAL MOD COMPLEX 30 MIN: CPT

## 2024-07-11 PROCEDURE — 63600175 PHARM REV CODE 636 W HCPCS

## 2024-07-11 PROCEDURE — 63600175 PHARM REV CODE 636 W HCPCS: Performed by: INTERNAL MEDICINE

## 2024-07-11 PROCEDURE — 80053 COMPREHEN METABOLIC PANEL: CPT | Performed by: NURSE PRACTITIONER

## 2024-07-11 PROCEDURE — 25000003 PHARM REV CODE 250

## 2024-07-11 PROCEDURE — 63600175 PHARM REV CODE 636 W HCPCS: Performed by: STUDENT IN AN ORGANIZED HEALTH CARE EDUCATION/TRAINING PROGRAM

## 2024-07-11 PROCEDURE — 85025 COMPLETE CBC W/AUTO DIFF WBC: CPT

## 2024-07-11 PROCEDURE — 97530 THERAPEUTIC ACTIVITIES: CPT

## 2024-07-11 PROCEDURE — 83735 ASSAY OF MAGNESIUM: CPT | Performed by: NURSE PRACTITIONER

## 2024-07-11 PROCEDURE — 94761 N-INVAS EAR/PLS OXIMETRY MLT: CPT

## 2024-07-11 PROCEDURE — 93005 ELECTROCARDIOGRAM TRACING: CPT

## 2024-07-11 RX ORDER — OXYCODONE HYDROCHLORIDE 5 MG/1
5 TABLET ORAL EVERY 4 HOURS PRN
Status: DISCONTINUED | OUTPATIENT
Start: 2024-07-11 | End: 2024-07-17 | Stop reason: HOSPADM

## 2024-07-11 RX ORDER — POLYETHYLENE GLYCOL 3350 17 G/17G
17 POWDER, FOR SOLUTION ORAL DAILY
Status: DISCONTINUED | OUTPATIENT
Start: 2024-07-11 | End: 2024-07-15

## 2024-07-11 RX ORDER — MAGNESIUM SULFATE HEPTAHYDRATE 40 MG/ML
2 INJECTION, SOLUTION INTRAVENOUS ONCE
Status: COMPLETED | OUTPATIENT
Start: 2024-07-11 | End: 2024-07-11

## 2024-07-11 RX ADMIN — OXCARBAZEPINE 300 MG: 300 TABLET, FILM COATED ORAL at 08:07

## 2024-07-11 RX ADMIN — OXYCODONE HYDROCHLORIDE 5 MG: 5 TABLET ORAL at 12:07

## 2024-07-11 RX ADMIN — DEXTROSE MONOHYDRATE 1 G: 2.5 INJECTION INTRAVENOUS at 07:07

## 2024-07-11 RX ADMIN — HEPARIN SODIUM 5000 UNITS: 5000 INJECTION INTRAVENOUS; SUBCUTANEOUS at 09:07

## 2024-07-11 RX ADMIN — SENNOSIDES AND DOCUSATE SODIUM 1 TABLET: 50; 8.6 TABLET ORAL at 08:07

## 2024-07-11 RX ADMIN — SENNOSIDES AND DOCUSATE SODIUM 1 TABLET: 50; 8.6 TABLET ORAL at 09:07

## 2024-07-11 RX ADMIN — OXYCODONE HYDROCHLORIDE 5 MG: 5 TABLET ORAL at 10:07

## 2024-07-11 RX ADMIN — NIFEDIPINE 30 MG: 30 TABLET, FILM COATED, EXTENDED RELEASE ORAL at 09:07

## 2024-07-11 RX ADMIN — OXCARBAZEPINE 300 MG: 300 TABLET, FILM COATED ORAL at 09:07

## 2024-07-11 RX ADMIN — MAGNESIUM SULFATE HEPTAHYDRATE 2 G: 40 INJECTION, SOLUTION INTRAVENOUS at 03:07

## 2024-07-11 RX ADMIN — HEPARIN SODIUM 5000 UNITS: 5000 INJECTION INTRAVENOUS; SUBCUTANEOUS at 06:07

## 2024-07-11 RX ADMIN — CEFAZOLIN 2 G: 2 INJECTION, POWDER, FOR SOLUTION INTRAMUSCULAR; INTRAVENOUS at 10:07

## 2024-07-11 RX ADMIN — HEPARIN SODIUM 5000 UNITS: 5000 INJECTION INTRAVENOUS; SUBCUTANEOUS at 01:07

## 2024-07-11 RX ADMIN — LEVETIRACETAM 2000 MG: 750 TABLET, FILM COATED ORAL at 08:07

## 2024-07-11 RX ADMIN — OXYCODONE HYDROCHLORIDE 5 MG: 5 TABLET ORAL at 02:07

## 2024-07-11 RX ADMIN — OXYCODONE HYDROCHLORIDE 5 MG: 5 TABLET ORAL at 07:07

## 2024-07-11 RX ADMIN — ACETAMINOPHEN 650 MG: 325 TABLET ORAL at 01:07

## 2024-07-11 RX ADMIN — SODIUM CHLORIDE: 9 INJECTION, SOLUTION INTRAVENOUS at 09:07

## 2024-07-11 RX ADMIN — LEVETIRACETAM 2000 MG: 750 TABLET, FILM COATED ORAL at 09:07

## 2024-07-11 RX ADMIN — ACETAMINOPHEN 650 MG: 325 TABLET ORAL at 07:07

## 2024-07-11 RX ADMIN — LOSARTAN POTASSIUM 50 MG: 50 TABLET, FILM COATED ORAL at 08:07

## 2024-07-11 RX ADMIN — ACETAMINOPHEN 650 MG: 325 TABLET ORAL at 02:07

## 2024-07-11 RX ADMIN — CEFAZOLIN 2 G: 2 INJECTION, POWDER, FOR SOLUTION INTRAMUSCULAR; INTRAVENOUS at 02:07

## 2024-07-11 RX ADMIN — NIFEDIPINE 30 MG: 30 TABLET, FILM COATED, EXTENDED RELEASE ORAL at 08:07

## 2024-07-11 RX ADMIN — CLOBAZAM 40 MG: 10 TABLET ORAL at 09:07

## 2024-07-11 NOTE — PLAN OF CARE
Problem: Occupational Therapy  Goal: Occupational Therapy Goal  Description: Goals to be met by: 8/11/2024     Patient will increase functional independence with ADLs by performing:    UE Dressing with Minimal Assistance.  LE Dressing with Set-up Assistance.  Grooming while standing at sink with Supervision.  Toileting from toilet with Supervision for hygiene and clothing management.   Step transfer with Contact Guard Assistance  Toilet transfer to toilet with Supervision.    Outcome: Progressing

## 2024-07-11 NOTE — PT/OT/SLP EVAL
Occupational Therapy  Co- Evaluation and Treatment    Name: Haydee Linda  MRN: [id number]  Admitting Diagnosis: AVM (arteriovenous malformation) brain  Recent Surgery: * No procedures listed * 2 Days Post-Op    Recommendations:     Discharge Recommendations: High Intensity Therapy (may progress)  Discharge Equipment Recommendations:   (TBD pending pt progress)  Barriers to discharge:       Assessment:     Haydee Linda is a 69 y.o. female with a medical diagnosis of AVM (arteriovenous malformation) brain. Performance deficits affecting function: impaired endurance, weakness, impaired self care skills, impaired functional mobility, gait instability, impaired balance, decreased safety awareness, decreased lower extremity function, pain, impaired coordination, impaired fine motor, impaired cognition.  Pt agreeable to therapy and tolerated well. Pt continuously repeated prior medical history and work history 4x throughout session with inability to recall recent repetition. Pt with RUE intention tremors throughout session and LLE knee buckling in stance. Pt remains limited in ADLs, functional mobility, and functional transfers. Patient presents with good participation and motivation to return to prior level of function with high intensity therapy. The patient demonstrates appropriate endurance, strength, pain control, and fine motor control to participate in up to 3 hours or 15hrs of combined therapy post acute.    Co-treat performed due to acuity and complexity of pt's medical status with 2 skilled disciplines needed to optimize pts occupational performance and  decreased activity tolerance.     Rehab Prognosis: Good; patient would benefit from acute skilled OT services to address these deficits and reach maximum level of function.       Plan:     Patient to be seen 4 x/week to address the above listed problems via self-care/home management, therapeutic exercises, neuromuscular re-education, therapeutic  "activities  Plan of Care Expires: 08/11/24  Plan of Care Reviewed with: patient    Subjective     Chief Complaint: headache  Patient/Family Comments/goals: "I'm 20 hours away from getting my PhD."    Occupational Profile:  Living Environment: Pt lives with  in # story house with 2STE, 22 steps to 2nd floor. Pt lives on 1st floor with WIS and built-in bench and grab bars  Previous level of function: Mod (I) ADLs and mobility. Pt reports needing assistance from  when seizure mediation sets in around nighttime. 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  Patient found HOB elevated with hemovac, telemetry, blood pressure cuff, bed alarm, peripheral IV, PureWick upon OT entry to room.    General Precautions: Standard, fall  Orthopedic Precautions: N/A  Braces: N/A  Respiratory Status: Room air    Occupational Performance:    Bed Mobility:    Patient completed Scooting/Bridging with contact guard assistance  Patient completed Supine to Sit with contact guard assistance  Patient completed Sit to Supine with moderate assistance at BLE and trunk    Functional Mobility/Transfers:  Patient completed Sit <> Stand Transfer with minimum assistance  with  hand-held assist   2 stands performed  EOB. Pt with L knee buckling and required Min A for knee   Functional Mobility: Pt attempted to perform functional mobility standing at EOB to maximize functional endurance and standing balance required for home/community mobility and occupational engagement. Pt attempted to perform R lateral step " and able to lift RLE but unable to clear LLE and  returned to sitting    Activities of Daily Living:  Grooming: contact guard assistance oral hygiene performed sitting EOB  Lower Body Dressing: supervision donned/doffed   socks in long sitting in bed    Cognitive/Visual Perceptual:  Cognitive/Psychosocial Skills:     -       Oriented to: Person, Place, Time, and Situation   -       Follows Commands/attention:Follows two-step commands  -       Communication: clear/fluent  -       Memory: Pt continuously repeating her work and prior medical history with inability to remember recent conversation   -       Safety awareness/insight to disability: intact   -       Mood/Affect/Coping skills/emotional control: Appropriate to situation    Physical Exam: RUE intention tremors  Sensation:    -       Intact  Dominant hand:    -       Right  Upper Extremity Range of Motion:     -       Right Upper Extremity: WFL  -       Left Upper Extremity: WFL  Upper Extremity Strength:    -       Right Upper Extremity: WFL  -       Left Upper Extremity: WFL   Strength:    -       Right Upper Extremity: WFL  -       Left Upper Extremity: WFL  Fine Motor Coordination:    -       Intact    AMPAC 6 Click ADL:  AMPAC Total Score: 18    Treatment & Education:  -Education on energy conservation and task modification to maximize safety and (I) during ADLs and mobility  -Education on importance of OOB activity to improve overall activity tolerance and promote recovery  -Pt educated to call for assistance and to transfer with hospital staff only  -Provided education regarding role of OT, POC, & discharge recommendations with pt verbalizing understanding.  Pt had no further questions & when asked whether there were any concerns pt reported none.      Patient left HOB elevated with all lines intact, call button in reach, bed alarm on, and RN notified    GOALS:   Multidisciplinary Problems       Occupational Therapy Goals          Problem:  Occupational Therapy    Goal Priority Disciplines Outcome Interventions   Occupational Therapy Goal     OT, PT/OT Progressing    Description: Goals to be met by: 8/11/2024     Patient will increase functional independence with ADLs by performing:    UE Dressing with Minimal Assistance.  LE Dressing with Set-up Assistance.  Grooming while standing at sink with Supervision.  Toileting from toilet with Supervision for hygiene and clothing management.   Step transfer with Contact Guard Assistance  Toilet transfer to toilet with Supervision.                         History:     Past Medical History:   Diagnosis Date    CHF (congestive heart failure)     Coronary artery disease     Hypertension     Seizures     ST elevation (STEMI) myocardial infarction of unspecified site     2022    Stroke     intracranial hemorrhage         Past Surgical History:   Procedure Laterality Date    BREAST SURGERY      Cyst , enhancement - 1984    CRANIOTOMY USING FRAMELESS STEREOTAXY Right 7/10/2024    Procedure: ANTERIOR TEMPORAL LOBECTOMY AND TEMPORAL AVM CRANIOTOMY, USING FRAMELESS STEREOTAXY;  Surgeon: Lynda Patel MD;  Location: [facility] OR [address] [place] [address];  Service: Neurosurgery;  Laterality: Right;  BRAIN LAB    cyst removal of neck      Benign- Lymphnode-    Dental, Jaw surgery- 2023      TONSILLECTOMY      Tonsillectomy and adenoidectomy at age 7 years       Time Tracking:     OT Date of Treatment: 07/11/24  OT Start Time: 1256  OT Stop Time: 1333  OT Total Time (min): 37 min    Billable Minutes:Evaluation 10 min  Self Care/Home Management 14 min  Neuromuscular Re-education 13 min    7/11/2024

## 2024-07-11 NOTE — PLAN OF CARE
Problem: Physical Therapy  Goal: Physical Therapy Goal  Description: Goals to be met by: 24    Patient will increase functional independence with mobility by performin. Supine to sit with [place]  2. Sit to supine with [place]  3. Sit to stand transfer with Supervision with or without LRAD  4. Gait  x 20 feet with Contact Guard Assistance with or without LRAD   5. Ascend/descend 2 stair with Minimal Assistance with or without LRAD   6. Sitting at edge of bed 10 minutes with [place]  7. Stand for 3 minutes with Modified [place] with or without LRAD  8. Lower extremity exercise program x20 reps per handout, with assistance as needed    Outcome: Progressing    Evaluation Complete. Goals Appropriate.
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
## 2024-07-11 NOTE — ASSESSMENT & PLAN NOTE
70 yo female who presented for elective AVM embolization on 7/9 and anterior temporal lobectomy on 7/10.    CTH: Resection of REE. No new acute hemorrhages  MRI: resection of both REE and AVM. No acute hydrocephalus or hemorrhage.    Recommendations:  --1 SG drain to full compression     -Ancef while drain in place  --SBP<140  --PT/OT  --Restarted on home AEDs  --Please call NSGY with any acute decline in neurological exam    Discussed with Dr. Patel  Dispo: Pending

## 2024-07-11 NOTE — PLAN OF CARE
[facility] Care Plan    POC reviewed with Haydee UREÑA Maddi and family at 0300. Patient and Family verbalized understanding. Questions and concerns addressed.Neuro status unchanged. SBP goal maintained. Gave PRN pain meds as needed to keep comfortable. Gave migraine cocktail,  went down for MRI Pt progressing toward goals. Will continue to monitor. See below and flowsheets for full assessment and VS info.             Is this a stroke patient? no    Neuro:  Coral Coma Scale  Best Eye Response: 3-->(E3) to speech  Best Motor Response: 6-->(M6) obeys commands  Best Verbal Response: 5-->(V5) oriented  Coral Coma Scale Score: 14  Assessment Qualifiers: patient not sedated/intubated  Pupil PERRLA: yes     24 hr Temp:  [97.1 °F (36.2 °C)-98.5 °F (36.9 °C)]     CV:   Rhythm: normal sinus rhythm  BP goals:   SBP < 140  MAP > 65    Resp: room air          Plan: N/A    GI/:     Diet/Nutrition Received: NPO  Last Bowel Movement: 07/08/24  Voiding Characteristics: ureteral catheter    Intake/Output Summary (Last 24 hours) at 7/11/2024 0726  Last data filed at 7/11/2024 0605  Gross per 24 hour   Intake 3816.11 ml   Output 3330 ml   Net 486.11 ml     Unmeasured Output  Stool Occurrence: 0    Labs/Accuchecks:  Recent Labs   Lab 07/11/24  0217   WBC 10.18   RBC 2.89*   HGB 8.6*   HCT 26.3*         Recent Labs   Lab 07/11/24  0217   *   K 4.3   CO2 22*      BUN 10   CREATININE 0.8   ALKPHOS 90   ALT 19   AST 21   BILITOT 0.3      Recent Labs   Lab 07/09/24  1243 07/10/24  0655   INR 0.9  --    APTT  --  30.8      Recent Labs   Lab 07/11/24  0217   TROPONINI <0.006       Electrolytes: N/A - electrolytes WDL  Accuchecks: none    Gtts:   0.9% NaCl   Intravenous Continuous 100 mL/hr at 07/11/24 0605 Rate Verify at 07/11/24 0605    nicardipine  0-15 mg/hr Intravenous Continuous   Held at 07/10/24 1845       LDA/Wounds      Nurses Note -- 4 Eyes    Is there altered skin present? no    Second RN/Staff Member:  Isabel  RN

## 2024-07-11 NOTE — ASSESSMENT & PLAN NOTE
Secondary seizure disorder  69F PMHx of HFrEF and right temporal intraparenchymal hemorrhage complicated by medically refractory epilepsy currently maintained on Clobazam 40 mg qhs, Levetiracetam 2g BID, and Oxcarbazepine 300 mg BID now managed by Dr. 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 Now s/p right frontotemporal craniotomy for resection of AVM by Dr. Bo and right temporal lobectomy by Dr. Lynda Patel on 7/10. Epilepsy following for assistance in management.    Recommendations:  - Recommend to continue outpatient AED regimen: Clobazam 40 mg qhs, Levetiracetam 2g BID, and Oxcarbazepine 300 mg BID  - Avoid agents that lower seizure threshold  - Postoperative management per NSGY  - Management of infectious/metabolic abnormalities per NCC  - Seizure precautions    Discussed plan of care with patient and NCC team. No family at bedside. Will follow, please call with questions.

## 2024-07-11 NOTE — ANESTHESIA POSTPROCEDURE EVALUATION
Anesthesia Post Evaluation    Patient: Haydee Linda    Procedure(s) Performed: Procedure(s) (LRB):  ANTERIOR TEMPORAL LOBECTOMY AND TEMPORAL AVM CRANIOTOMY, USING FRAMELESS STEREOTAXY (Right)    Final Anesthesia Type: general      Patient location during evaluation: ICU  Patient participation: Yes- Able to Participate  Level of consciousness: awake and alert  Post-procedure vital signs: reviewed and stable  Pain management: pain needs to be addressed  Airway patency: patent  ANGELO mitigation strategies: Extubation while patient is awake, Multimodal analgesia and Verification of full reversal of neuromuscular block  PONV status at discharge: No PONV  Anesthetic complications: no      Cardiovascular status: stable  Respiratory status: unassisted and spontaneous ventilation  Hydration status: euvolemic  Follow-up not needed.              Vitals Value Taken Time   /58 07/11/24 0902   Temp 37.2 °C (98.9 °F) 07/11/24 0709   Pulse 82 07/11/24 0907   Resp 13 07/11/24 0907   SpO2 95 % 07/11/24 0907   Vitals shown include unfiled device data.      No case tracking events are documented in the log.      Pain/Shaneka Score: Pain Rating Prior to Med Admin: 10 (7/11/2024  7:59 AM)  Pain Rating Post Med Admin: 10 (7/10/2024  5:21 PM)

## 2024-07-11 NOTE — ASSESSMENT & PLAN NOTE
- SM1 right temporal AVM s/p angiogram with kd embolization by IR on 7/9  - S/p right frontotemporal craniotomy for resection of AVM by Dr. Bo and right temporal lobectomy by Dr. Lynda Patel on 7/10  - MRI Brain WO with expected postop changes, 2 mm leftward MLS, thin extra-axial collection vs postop dural plasty material (felt more likely)  - Management per NSGY, NCC

## 2024-07-11 NOTE — SUBJECTIVE & OBJECTIVE
Interval History: NAEON. Pt exam stable this morning. SG drain put out 215cc overnight plan to keep today. Will get PT/OT to work with pt this morning. CTH showed no new acute bleeds. MRI showed expected resection of the anterior temporal lobe.    Medications:  Continuous Infusions:   0.9% NaCl   Intravenous Continuous 100 mL/hr at 07/11/24 0605 Rate Verify at 07/11/24 0605    nicardipine  0-15 mg/hr Intravenous Continuous   Held at 07/10/24 1845     Scheduled Meds:   ceFAZolin (Ancef) IV (PEDS and ADULTS)  2 g Intravenous Q8H    cloBAZam  40 mg Oral QHS    heparin (porcine)  5,000 Units Subcutaneous Q8H    levETIRAcetam  2,000 mg Oral BID    losartan  50 mg Oral Daily    NIFEdipine  30 mg Oral BID    OXcarbazepine  300 mg Oral BID    senna-docusate 8.6-50 mg  1 tablet Oral BID     PRN Meds:  Current Facility-Administered Medications:     acetaminophen, 650 mg, Oral, Q6H PRN    dextrose 10%, 12.5 g, Intravenous, PRN    dextrose 10%, 25 g, Intravenous, PRN    glucagon (human recombinant), 1 mg, Intramuscular, PRN    glucose, 16 g, Oral, PRN    glucose, 24 g, Oral, PRN    magnesium oxide, 800 mg, Oral, PRN    magnesium oxide, 800 mg, Oral, PRN    oxyCODONE, 5 mg, Oral, Q4H PRN    potassium bicarbonate, 35 mEq, Oral, PRN    potassium bicarbonate, 50 mEq, Oral, PRN    potassium bicarbonate, 60 mEq, Oral, PRN    potassium, sodium phosphates, 2 packet, Oral, PRN    potassium, sodium phosphates, 2 packet, Oral, PRN    potassium, sodium phosphates, 2 packet, Oral, PRN    sodium chloride 0.9%, 10 mL, Intravenous, PRN     Review of Systems  Objective:     Weight: 75.8 kg (167 lb 1.7 oz)  Body mass index is 25.41 kg/m².  Vital Signs (Most Recent):  Temp: 98.9 °F (37.2 °C) (07/11/24 0709)  Pulse: 77 (07/11/24 0744)  Resp: 12 (07/11/24 0605)  BP: (!) 98/53 (07/11/24 0605)  SpO2: 98 % (07/11/24 0605) Vital Signs (24h Range):  Temp:  [97.1 °F (36.2 °C)-98.9 °F (37.2 °C)] 98.9 °F (37.2 °C)  Pulse:  [71-91] 77  Resp:  [12-32]  "12  SpO2:  [95 %-100 %] 98 %  BP: ()/(51-83) 98/53  Arterial Line BP: (104-145)/(50-73) 108/50                              Closed/Suction Drain 07/10/24 1309 Tube - 1 Right Scalp Accordion 10 Fr. (Active)   Site Description Healing 07/11/24 0505   Dressing Type Gauze 07/11/24 0505   Dressing Status Clean;Dry;Intact 07/11/24 0505   Dressing Intervention Integrity maintained 07/11/24 0505   Drainage Bloody 07/11/24 0505   Output (mL) 65 mL 07/11/24 0447            Urethral Catheter 07/10/24 0845 Silicone;Non-latex;Straight-tip 16 Fr. (Active)   $ Coon Insertion Bedside Insertion Performed 07/10/24 1500   Site Assessment Clean;Intact 07/11/24 0505   Collection Container Urimeter 07/11/24 0505   Securement Method secured to top of thigh w/ adhesive device 07/11/24 0505   Catheter Care Performed no 07/11/24 0505   Reason for Continuing Urinary Catheterization Post operative 07/11/24 0505   CAUTI Prevention Bundle Securement Device in place with 1" slack;Intact seal between catheter & drainage tubing;Drainage bag/urimeter off the floor;Sheeting clip in use;No dependent loops or kinks;Drainage bag/urimeter below bladder;Drainage bag/urimeter not overfilled (<2/3 full) 07/11/24 0305   Output (mL) 145 mL 07/11/24 0605          Physical Exam         Neurosurgery Physical Exam    General: well developed, well nourished, no distress.   HEENT: normocephalic, atraumatic, Surgical dressing in place  CV: regular rate   Pulmonary: normal respirations, no signs of respiratory distress  Abdomen: soft, non-distended, not tender to palpation  Skin: Skin is warm, dry and intact  Heme: no bruising    Neuro:   Mental Status: AO x4, no aphasia, no dysarthria   CN: PERRL, EOMI, VF intact to confrontation, sensation intact bilaterally, eyebrow raise and grimace symmetric, tongue midline  Motor: moves all extremities spontaneously, full strength throughout, no pronator drift   Sensory: intact to light touch throughout     Incision: " Clean, dry, intact. Skin edges well approximated. No surrounding erythema or edema. No drainage or TTP.      Significant Labs:  Recent Labs   Lab 07/09/24  1730 07/10/24  0152 07/10/24  1513 07/11/24  0217   * 125* 183* 114*   * 130* 132* 133*   K 3.8 4.8 4.3 4.3    101 103 104   CO2 24 22* 21* 22*   BUN 17 14 9 10   CREATININE 0.8 0.9 0.8 0.8   CALCIUM 9.1 9.5 9.3 9.1   MG 2.0 2.0  --  2.1     Recent Labs   Lab 07/10/24  0152 07/10/24  0904 07/10/24  1317 07/10/24  1513 07/11/24  0217   WBC 5.33  --   --  9.86 10.18   HGB 9.9*  --   --  9.9* 8.6*   HCT 28.9*   < > 25* 30.3* 26.3*     --   --  295 216    < > = values in this interval not displayed.     Recent Labs   Lab 07/09/24  1243 07/10/24  0655   INR 0.9  --    APTT  --  30.8     Microbiology Results (last 7 days)       ** No results found for the last 168 hours. **          All pertinent labs from the last 24 hours have been reviewed.    Significant Diagnostics:  CT: CT Head Without Contrast    Result Date: 7/10/2024  Postoperative change of right frontotemporal craniotomy, right anterior temporal lobectomy, and right temporal AVM embolization. There is postoperative gas and fluid underlying the craniotomy with additional pneumocephalus overlying the frontal lobes Slight mass effect and approximately 0.3-0.4 cm of leftward midline shift.  Smaller caliber ventricles without hydrocephalus No definite significant volume acute intracranial hemorrhage.  Study significantly distorted by motion artifacts.  Clinical correlation and follow-up advised Electronically signed by resident: Ez Ludwig Date:    07/10/2024 Time:    15:58 Electronically signed by: Joao Rojas DO Date:    07/10/2024 Time:    16:36   MRI: MRI Brain Without Contrast    Result Date: 7/11/2024  Postprocedural change of right frontotemporal craniotomy, right anterior temporal lobe resection, and embolization of AVM.  Expected postoperative change without obvious  complication. 2 mm leftward midline shift, mildly improved from prior CT. Thin extra-axial collection versus postoperative dural plasty material (felt more likely), attention on follow-up. Electronically signed by resident: Sharyn Sousa Date:    07/11/2024 Time:    02:20 Electronically signed by: El Velasquez Date:    07/11/2024 Time:    03:03   I have reviewed all pertinent imaging results/findings within the past 24 hours.

## 2024-07-11 NOTE — SUBJECTIVE & OBJECTIVE
Review of Systems  Constitutional: Denies fevers or chills.  Pulmonary: Denies shortness of breath or cough.  Cardiology: Denies chest pain or palpitations.  GI: Denies abdominal pain or constipation.  Neurologic: Denies new weakness,  headache, or paresthesias.  Objective:     Vitals:  Temp: 98.4 °F (36.9 °C)  Pulse: 82  Rhythm: normal sinus rhythm  BP: 112/65  MAP (mmHg): 84  Resp: 20  SpO2: 98 %    Temp  Min: 97.1 °F (36.2 °C)  Max: 98.9 °F (37.2 °C)  Pulse  Min: 71  Max: 91  BP  Min: 86/51  Max: 132/83  MAP (mmHg)  Min: 66  Max: 101  Resp  Min: 12  Max: 32  SpO2  Min: 95 %  Max: 100 %    07/10 0701 - 07/11 0700  In: 3816.1 [P.O.:170; I.V.:1142.2]  Out: 3330 [Urine:3115; Drains:215]   Unmeasured Output  Stool Occurrence: 0        Physical Exam  GA: Alert, comfortable, no acute distress.   HEENT: No scleral icterus or JVD.   Pulmonary: Clear to auscultation A/L.   Cardiac: RRR S1 & S2 w/o rubs/murmurs/gallops.   Abdominal: Bowel sounds present x 4. No appreciable hepatosplenomegaly.  Skin: No jaundice, rashes, or visible lesions.  Neuro:  --GCS: E3 V5 M6  --Mental Status:  awake, oriented X4, follows commands  --CN II-XII grossly intact.   --Pupils 2mm, PERRL.   --Corneal reflex, gag, cough intact.  --JOEL spont    Medications:  Continuous ScheduledceFAZolin (Ancef) IV (PEDS and ADULTS), 2 g, Q8H  cloBAZam, 40 mg, QHS  heparin (porcine), 5,000 Units, Q8H  levETIRAcetam, 2,000 mg, BID  losartan, 50 mg, Daily  NIFEdipine, 30 mg, BID  OXcarbazepine, 300 mg, BID  polyethylene glycol, 17 g, Daily  senna-docusate 8.6-50 mg, 1 tablet, BID    PRNacetaminophen, 650 mg, Q6H PRN  dextrose 10%, 12.5 g, PRN  dextrose 10%, 25 g, PRN  glucagon (human recombinant), 1 mg, PRN  glucose, 16 g, PRN  glucose, 24 g, PRN  magnesium oxide, 800 mg, PRN  magnesium oxide, 800 mg, PRN  oxyCODONE, 5 mg, Q4H PRN  potassium bicarbonate, 35 mEq, PRN  potassium bicarbonate, 50 mEq, PRN  potassium bicarbonate, 60 mEq, PRN  potassium, sodium  phosphates, 2 packet, PRN  potassium, sodium phosphates, 2 packet, PRN  potassium, sodium phosphates, 2 packet, PRN  sodium chloride 0.9%, 10 mL, PRN      Today I personally reviewed pertinent medications, lines/drains/airways, imaging, cardiology results, laboratory results, microbiology results,     Diet  Diet Adult Regular
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
## 2024-07-11 NOTE — ASSESSMENT & PLAN NOTE
Patient reported HFrEF  [facility] 8/2022: The left main is angiographically normal. The left anterior descending is angiographically normal. The circumflex is angiographically normal. The right coronary artery is a dominant vessel of the heart and angiographically normal. Left Ventriculography a single PATRICK ventriculogram was performed revealing an EF of 55-60%.  - Management per NCC

## 2024-07-11 NOTE — PLAN OF CARE
[facility] Care Plan  POC reviewed with Haydee Linda and family at 1400. Patient verbalized understanding. Questions and concerns addressed. No acute events today. Pt progressing toward goals. Will continue to monitor. See below and flowsheets for full assessment and VS info.             Is this a stroke patient? no    Neuro:  Dearing Coma Scale  Best Eye Response: 3-->(E3) to speech  Best Motor Response: 6-->(M6) obeys commands  Best Verbal Response: 5-->(V5) oriented  [place]oral Coma Scale Score: 14  Assessment Qualifiers: no eye obstruction present  Pupil PERRLA: yes     24 hr Temp:  [97.1 °F (36.2 °C)-98.9 °F (37.2 °C)]     CV:   Rhythm: normal sinus rhythm  BP goals:   SBP < 140  MAP > 65    Resp:           Plan: N/A    GI/:     Diet/Nutrition Received: regular  Last Bowel Movement: 24  Voiding Characteristics: ureteral catheter    Intake/Output Summary (Last 24 hours) at 2024 1619  Last data filed at 2024 1616  Gross per 24 hour   Intake 2730.97 ml   Output 2870 ml   Net -139.03 ml     Unmeasured Output  Stool Occurrence: 0    Labs/Accuchecks:  Recent Labs   Lab 24   WBC 10.18   RBC 2.89*   HGB 8.6*   HCT 26.3*         Recent Labs   Lab 24   *   K 4.3   CO2 22*      BUN 10   CREATININE 0.8   ALKPHOS 90   ALT 19   AST 21   BILITOT 0.3      Recent Labs   Lab 24  1243 07/10/24  0655   INR 0.9  --    APTT  --  30.8      Recent Labs   Lab 247   TROPONINI <0.006       Electrolytes: N/A - electrolytes WDL  Accuchecks: none    Gtts:      LDA/Wounds:      Nurses Note -- 4 Eyes    Is there altered skin present? no    Second RN/Staff Member:  JAYLENE Becker      Restraints:   Restraint Order  Length of Order: Order good for next 24 hours or when removed.  Date that the current order will : 24  Time that the current order will :   Order Upon Application: Yes    [facility]

## 2024-07-11 NOTE — PT/OT/SLP EVAL
Physical Therapy Co-Evaluation    Patient Name:  Haydee Linda   MRN:  [id number]    Recommendations:     Discharge Recommendations: High Intensity Therapy   Discharge Equipment Recommendations: bedside commode, continuing to assess  Barriers to discharge:  Increased skilled assistance required    Assessment:   Co-evaluation performed due to multiple deficits anticipated requiring two skilled therapists to appropriately and safely assess patient's strength, endurance, functional mobility, and ADL performance while facilitating functional tasks in addition to accommodating for patient's activity tolerance and medical acuity.     Haydee Linda is a 69 y.o. female admitted with a medical diagnosis of AVM (arteriovenous malformation) brain. Patient presented with good motivation to participate in evaluation, with fairly good response to completed activities and provided education. Patient demonstrated good quality bed mobility, however increased time required for task completion secondary to patient quick to fatigue. [place] for sit<>stand trials & sustained standing, with patient tendency for posterior lean & L knee buckling. R lateral steps along EOB attempted in which patient able to clear RLE, however unable to initiate LLE step. Of note, patient perseverated on medical history and work throughout session. Based upon information gained, PT recommends high intensity skilled physical therapy services post-acutely. Provided recommendation based upon needed intensity to not only directly address patient's previously listed functional impairments, discrepancy between functional baseline & current mobility status, and high falls risk, but to also positively impact patient's quality of life & intervene on high risk for caregiver burnout. Performance deficits impacting function include weakness, impaired endurance, impaired functional mobility, impaired balance, gait instability, decreased lower extremity function,  "decreased upper extremity function, pain, impaired coordination.    Rehab Prognosis: Good; patient would benefit from acute skilled PT services to address these deficits and reach maximum level of function.    Recent Surgery: * No procedures listed * 2 Days Post-Op    Plan:     During this hospitalization, patient to be seen 4 x/week to address the identified rehab impairments via gait training, therapeutic activities, therapeutic exercises, neuromuscular re-education and progress toward the following goals:    Plan of Care Expires:  08/11/24    Subjective     Chief Complaint: Pain  Patient/Family Comments/goals: "I had a bad seizure 4 days ago. I hadn't had any seizures this month up until that one."  Pain/Comfort:  Pain Rating 1: Intensity not provided  Location - Side 1: Right  Location 1: head  Pain Addressed 1: Reposition, Distraction, Pre-medicate for activity, Nurse notified  Pain Rating Post-Intervention 1: Intensity not provided    Patients cultural, spiritual, Methodist conflicts given the current situation: no    Social History:  Residence: Patient lives with their spouse in  [place]  with  2STE . Patient's bathroom has a Tub & WIS, with associated GB & built-in bench.  Equipment Owned: grab bar  Prior level of function:  Prior to admission, patient  required HHA  for ambulation. Patient was modified independent for transitions/transfers.  Assistance Upon Discharge:  Spouse    Objective:     Communicated with Nsg prior to session.  Patient found HOB elevated with hemovac, bed alarm, blood pressure cuff, PureWick, peripheral IV, pulse ox (continuous), telemetry  upon PT entry to room.    General Precautions: Standard, fall   Orthopedic Precautions:N/A   Braces: N/A   Body mass index is 25.41 kg/m².  Oxygen Device: Room Air  Vitals: /72   Pulse 79   Temp 98.2 °F (36.8 °C) (Oral)   Resp (!) 23   Ht 5' 8" (1.727 m)   Wt 75.8 kg (167 lb 1.7 oz)   SpO2 99%   Breastfeeding No   BMI 25.41 kg/m² "     Exams:  Cognition:   Alert, Cooperative, and Tangential   Patient is oriented to Person, Place, Time, Situation  Command following: Follows multistep verbal commands  Fluency: clear/fluent  Skin Integrity: Visible skin intact  Postural Assessment: rounded shoulders and forward head  Physical Exam:    Left LE Right LE   Sensation intact to light touch intact to light touch   Coordination abnormal abnormal     LLE ROM: WFL  RLE ROM: WFL    LLE Strength (out of 5):   Hip Flexion:3: Holds test position  Hip Abduction:5: Holds test position against STRONG pressure  Hip Adduction:5: Holds test position against STRONG pressure  Knee Extension:4: Holds test positioning against MODERATE pressure  Knee Flexion:4: Holds test positioning against MODERATE pressure  Ankle Dorsiflexion:3+: Holds test position against SLIGHT pressure  Ankle Plantarflexion:3+: Holds test position against SLIGHT pressure    RLE Strength (out of 5):   Hip Flexion:3: Holds test position  Hip Abduction:5: Holds test position against STRONG pressure  Hip Adduction:5: Holds test position against STRONG pressure  Knee Extension:4+: Holds test position against MODERATE to STRONG pressure  Knee Flexion:4+: Holds test position against MODERATE to STRONG pressure  Ankle Dorsiflexion:4: Holds test positioning against MODERATE pressure  Ankle Plantarflexion:4-: Holds test position against SLIGHT to MODERATE pressure    Functional Mobility:  Bed Mobility:     EOB Scooting:   Anterior: Contact Guard Assistance  Boosting: Contact Guard Assistance with HOB Elevated  Supine>Sit: x1, Contact Guard Assistance with HOB Elevated  Sit>Supine: x1, Moderate Assistance at BLEs with HOB Elevated  *VC/TC for task sequencing  *Facilitation of LE management    Transfers:     Sit<>Stand: x2, Minimal Assistance from Edge of Bed with No AD  *VC/TC for upright posturing, glute engagement  *Facilitation of trunk extension, hip extension, L knee stabilization    Gait:  Attempted,  Maximum Assistance with  L HHA & RUE support via bed rail  Gait Assessment: decreased step length, decreased step height, unsteady gait, impaired lateral WS, impaired step initiation, impaired L TKE  *VC/TC for upright posturing, glute engagement, L quad engagement during stance, step sequencing, lateral WS  *Facilitation of hip extension, L knee stabilization, lateral WS    Balance:   Static Sitting: Stand-By Assistance - Contact Guard Assistance  Dynamic Sitting: Stand-By Assistance - Contact Guard Assistance    Static Standing: Minimal Assistance  *VC/TC for forward gaze, cervical extension, upright posturing, glute engagement, L quad engagement, anterior translation of VINCE  *Facilitation of trunk extension, hip extension, L knee stabilization, anterior translation of VINCE    AM-PAC 6 CLICK MOBILITY  Total Score:13     Treatment & Education:  Patient Education Provided on:  The role of physical therapy and how the patient can benefit from skilled services  The negative effects of prolonged bed rest/sedentary behavior, along with the importance of OOB activity & patient participation with PT  The importance of contacting RN, via call light, for mobility throughout the day  Pt white board updated with current therapists name and level of mobility assistance needed.     Patient Verbalized understanding of all topics touched on this date. All patient questions answered within the PT scope of practice    Patient left HOB elevated with all lines intact, call button in reach, bed alarm on, and RN notified.    GOALS:   Multidisciplinary Problems       Physical Therapy Goals          Problem: Physical Therapy    Goal Priority Disciplines Outcome Goal Variances Interventions   Physical Therapy Goal     PT, PT/OT Progressing     Description: Goals to be met by: 24    Patient will increase functional independence with mobility by performin. Supine to sit with [place]  2. Sit to supine with [place]  3.  Sit to stand transfer with Supervision with or without LRAD  4. Gait  x 20 feet with Contact Guard Assistance with or without LRAD   5. Ascend/descend 2 stair with Minimal Assistance with or without LRAD   6. Sitting at edge of bed 10 minutes with [place]  7. Stand for 3 minutes with Modified [place] with or without LRAD  8. Lower extremity exercise program x20 reps per handout, with assistance as needed                         History:     Past Medical History:   Diagnosis Date    CHF (congestive heart failure)     Coronary artery disease     Hypertension     Seizures     ST elevation (STEMI) myocardial infarction of unspecified site     2022    Stroke     intracranial hemorrhage       Past Surgical History:   Procedure Laterality Date    BREAST SURGERY      Cyst , enhancement - 1984    CRANIOTOMY USING FRAMELESS STEREOTAXY Right 7/10/2024    Procedure: ANTERIOR TEMPORAL LOBECTOMY AND TEMPORAL AVM CRANIOTOMY, USING FRAMELESS STEREOTAXY;  Surgeon: Lynda Patel MD;  Location: [facility] OR [address] [place] [address];  Service: Neurosurgery;  Laterality: Right;  BRAIN LAB    cyst removal of neck      Benign- Lymphnode-    Dental, Jaw surgery- 2023      TONSILLECTOMY      Tonsillectomy and adenoidectomy at age 7 years       Time Tracking:     PT Received On: 07/11/24  PT Start Time: 1254     PT Stop Time: 1333  PT Total Time (min): 39 min     Billable Minutes: Evaluation 10, Therapeutic Activity 15, and Neuromuscular Re-education 14      07/11/2024

## 2024-07-11 NOTE — SUBJECTIVE & OBJECTIVE
Interval History: NAEON. Patient endorses headache this morning, PRN analgesics given by RN. No other complaints. No family at bedside on rounds.    Current Facility-Administered Medications   Medication Dose Route Frequency Provider Last Rate Last Admin    acetaminophen tablet 650 mg  650 mg Oral Q6H PRN Walter Wilkins PA-C   650 mg at 07/11/24 0759    ceFAZolin 2 g in D5W 50 mL IVPB (MB+)  2 g Intravenous Q8H Sonya Shafer MD   Stopped at 07/11/24 1127    cloBAZam Tab 40 mg  40 mg Oral QHS Love, Leti L, NP   40 mg at 07/10/24 2023    dextrose 10% bolus 125 mL 125 mL  12.5 g Intravenous PRN Gosia Ng PA-C        dextrose 10% bolus 250 mL 250 mL  25 g Intravenous PRN Gosia Ng PA-C        glucagon (human recombinant) injection 1 mg  1 mg Intramuscular PRN Gosia Ng PA-C        glucose chewable tablet 16 g  16 g Oral PRN Gosia Ng PA-C        glucose chewable tablet 24 g  24 g Oral PRN Gosia Ng PA-C        heparin (porcine) injection 5,000 Units  5,000 Units Subcutaneous Q8H Francisco Scruggs MD   5,000 Units at 07/11/24 0626    levETIRAcetam tablet 2,000 mg  2,000 mg Oral BID Love, Leti L, NP   2,000 mg at 07/11/24 0852    losartan tablet 50 mg  50 mg Oral Daily Love, Leti L, NP   50 mg at 07/11/24 0852    magnesium oxide tablet 800 mg  800 mg Oral PRN Love, Leti L, NP        magnesium oxide tablet 800 mg  800 mg Oral PRN Love, Leti L, NP        NIFEdipine 24 hr tablet 30 mg  30 mg Oral BID Love, Leti L, NP   30 mg at 07/11/24 0852    OXcarbazepine tablet 300 mg  300 mg Oral BID Love, Leti L, NP   300 mg at 07/11/24 0852    oxyCODONE immediate release tablet 5 mg  5 mg Oral Q4H PRN MickalWalter PA-C   5 mg at 07/11/24 1213    polyethylene glycol packet 17 g  17 g Oral Daily Stephanie Blair NP        potassium bicarbonate disintegrating tablet 35 mEq  35 mEq Oral PRN Love, Leti L, NP        potassium bicarbonate disintegrating tablet 50 mEq  50 mEq Oral PRN Jessica, Leti L, NP        potassium  bicarbonate disintegrating tablet 60 mEq  60 mEq Oral PRN Jai Lopezica L, NP        potassium, sodium phosphates 280-160-250 mg packet 2 packet  2 packet Oral PRN Jessica Leti L, NP        potassium, sodium phosphates 280-160-250 mg packet 2 packet  2 packet Oral PRN Jessica, Leti L, NP        potassium, sodium phosphates 280-160-250 mg packet 2 packet  2 packet Oral PRN Jessica, Leti L, NP        senna-docusate 8.6-50 mg per tablet 1 tablet  1 tablet Oral BID Jessica Leti L, NP   1 tablet at 07/11/24 0854    sodium chloride 0.9% flush 10 mL  10 mL Intravenous PRN Moises Bonds MD         Continuous Infusions:    Review of Systems  Objective:     Vital Signs (Most Recent):  Temp: 98.4 °F (36.9 °C) (07/11/24 1100)  Pulse: 82 (07/11/24 1100)  Resp: 20 (07/11/24 1213)  BP: 112/65 (07/11/24 1100)  SpO2: 98 % (07/11/24 1100) Vital Signs (24h Range):  Temp:  [97.1 °F (36.2 °C)-98.9 °F (37.2 °C)] 98.4 °F (36.9 °C)  Pulse:  [71-91] 82  Resp:  [12-32] 20  SpO2:  [95 %-100 %] 98 %  BP: ()/(51-83) 112/65  Arterial Line BP: (104-145)/(50-73) 125/56     Weight: 75.8 kg (167 lb 1.7 oz)  Body mass index is 25.41 kg/m².     Physical Exam  Constitutional:       General: She is not in acute distress.     Appearance: She is not diaphoretic.   HENT:      Head:      Comments: Dressing intact, SG drain  Eyes:      General: No scleral icterus.        Right eye: No discharge.         Left eye: No discharge.      Conjunctiva/sclera: Conjunctivae normal.      Pupils: Pupils are equal, round, and reactive to light.   Cardiovascular:      Rate and Rhythm: Normal rate.   Pulmonary:      Effort: Pulmonary effort is normal. No respiratory distress.   Musculoskeletal:         General: No deformity or signs of injury.   Skin:     General: Skin is warm and dry.   Psychiatric:         Speech: Speech normal.         Behavior: Behavior is not agitated. Behavior is cooperative.            NEUROLOGICAL EXAMINATION:     MENTAL STATUS   Speech:  speech is normal     CRANIAL NERVES     CN III, IV, VI   Pupils are equal, round, and reactive to light.       Awake, alert, oriented  ANDERS OU   Corneal intact OU   + spontaneous eye opening   Face symmetric   Tongue midline   Moves all extremities  Follows commands    Exam findings to suggest seizures:  Myoclonus - no   eye twitching - no   Nystagmus - no   gaze deviation - no   waxy rigidity - no        Significant Labs: All pertinent lab results from the past 24 hours have been reviewed.    Significant Studies: I have reviewed all pertinent imaging results/findings within the past 24 hours.

## 2024-07-11 NOTE — PROGRESS NOTES
[facility]  Neurology-Epilepsy  Progress Note    Patient Name: Haydee Linda  MRN: [id number]  Admission Date: 7/9/2024  Hospital Length of Stay: 2 days  Code Status: Full Code   Attending Provider: Moe Phillips MD  Primary Care Physician: No, Primary Doctor   Principal Problem:Refractory epilepsy    Subjective:     Hospital Course:   7/10>7/11: S/p right frontotemporal craniotomy for resection of AVM by Dr. Bo and right temporal lobectomy by Dr. Lynda Patel on 7/10. Epilepsy following for assistance in management. Recommend to continue outpatient AED regimen: Clobazam 40 mg qhs, Levetiracetam 2g BID, and Oxcarbazepine 300 mg BID.    Interval History: NAEON. Patient endorses headache this morning, PRN analgesics given by RN. No other complaints. No family at bedside on rounds.    Current Facility-Administered Medications   Medication Dose Route Frequency Provider Last Rate Last Admin    acetaminophen tablet 650 mg  650 mg Oral Q6H PRN Walter Wilkins PA-C   650 mg at 07/11/24 0759    ceFAZolin 2 g in D5W 50 mL IVPB (MB+)  2 g Intravenous Q8H Sonya Shafer MD   Stopped at 07/11/24 1127    cloBAZam Tab 40 mg  40 mg Oral QHS Leti Lopez, NP   40 mg at 07/10/24 2023    dextrose 10% bolus 125 mL 125 mL  12.5 g Intravenous PRN Gosia Ng PA-C        dextrose 10% bolus 250 mL 250 mL  25 g Intravenous PRN Gosia Ng PA-C        glucagon (human recombinant) injection 1 mg  1 mg Intramuscular PRN Gosia Ng PA-C        glucose chewable tablet 16 g  16 g Oral PRN Gosia Ng PA-C        glucose chewable tablet 24 g  24 g Oral PRN Gosia Ng PA-C        heparin (porcine) injection 5,000 Units  5,000 Units Subcutaneous Q8H Francisco Scruggs MD   5,000 Units at 07/11/24 0626    levETIRAcetam tablet 2,000 mg  2,000 mg Oral BID Jessica Leti L, NP   2,000 mg at 07/11/24 0852    losartan tablet 50 mg  50 mg Oral Daily Leti Lopez, NP   50 mg at 07/11/24 0852    magnesium oxide tablet 800 mg  800 mg  Oral PRN Love, Leti L, NP        magnesium oxide tablet 800 mg  800 mg Oral PRN Love, Leti L, NP        NIFEdipine 24 hr tablet 30 mg  30 mg Oral BID Love, Leti L, NP   30 mg at 07/11/24 0852    OXcarbazepine tablet 300 mg  300 mg Oral BID Love, Leti L, NP   300 mg at 07/11/24 0852    oxyCODONE immediate release tablet 5 mg  5 mg Oral Q4H PRN Walter Wilkins PA-C   5 mg at 07/11/24 1213    polyethylene glycol packet 17 g  17 g Oral Daily Stephanie Blair, NP        potassium bicarbonate disintegrating tablet 35 mEq  35 mEq Oral PRN Love, Leti L, NP        potassium bicarbonate disintegrating tablet 50 mEq  50 mEq Oral PRN Love, Leti L, NP        potassium bicarbonate disintegrating tablet 60 mEq  60 mEq Oral PRN Love, Leti L, NP        potassium, sodium phosphates 280-160-250 mg packet 2 packet  2 packet Oral PRN Love, Leti L, NP        potassium, sodium phosphates 280-160-250 mg packet 2 packet  2 packet Oral PRN Love, Leti L, NP        potassium, sodium phosphates 280-160-250 mg packet 2 packet  2 packet Oral PRN Love, Leti L, NP        senna-docusate 8.6-50 mg per tablet 1 tablet  1 tablet Oral BID Love, Leti L, NP   1 tablet at 07/11/24 0854    sodium chloride 0.9% flush 10 mL  10 mL Intravenous PRN Moises Bonds MD         Continuous Infusions:    Review of Systems  Objective:     Vital Signs (Most Recent):  Temp: 98.4 °F (36.9 °C) (07/11/24 1100)  Pulse: 82 (07/11/24 1100)  Resp: 20 (07/11/24 1213)  BP: 112/65 (07/11/24 1100)  SpO2: 98 % (07/11/24 1100) Vital Signs (24h Range):  Temp:  [97.1 °F (36.2 °C)-98.9 °F (37.2 °C)] 98.4 °F (36.9 °C)  Pulse:  [71-91] 82  Resp:  [12-32] 20  SpO2:  [95 %-100 %] 98 %  BP: ()/(51-83) 112/65  Arterial Line BP: (104-145)/(50-73) 125/56     Weight: 75.8 kg (167 lb 1.7 oz)  Body mass index is 25.41 kg/m².     Physical Exam  Constitutional:       General: She is not in acute distress.     Appearance: She is not diaphoretic.   HENT:      Head:      Comments:  Dressing intact, SG drain  Eyes:      General: No scleral icterus.        Right eye: No discharge.         Left eye: No discharge.      Conjunctiva/sclera: Conjunctivae normal.      Pupils: Pupils are equal, round, and reactive to light.   Cardiovascular:      Rate and Rhythm: Normal rate.   Pulmonary:      Effort: Pulmonary effort is normal. No respiratory distress.   Musculoskeletal:         General: No deformity or signs of injury.   Skin:     General: Skin is warm and dry.   Psychiatric:         Speech: Speech normal.         Behavior: Behavior is not agitated. Behavior is cooperative.            NEUROLOGICAL EXAMINATION:     MENTAL STATUS   Speech: speech is normal     CRANIAL NERVES     CN III, IV, VI   Pupils are equal, round, and reactive to light.       Awake, alert, oriented  ANDERS OU   Corneal intact OU   + spontaneous eye opening   Face symmetric   Tongue midline   Moves all extremities  Follows commands    Exam findings to suggest seizures:  Myoclonus - no   eye twitching - no   Nystagmus - no   gaze deviation - no   waxy rigidity - no        Significant Labs: All pertinent lab results from the past 24 hours have been reviewed.    Significant Studies: I have reviewed all pertinent imaging results/findings within the past 24 hours.  Assessment and Plan:     * Refractory epilepsy  Secondary seizure disorder  69F PMHx of HFrEF and right temporal intraparenchymal hemorrhage complicated by medically refractory epilepsy currently maintained on Clobazam 40 mg qhs, Levetiracetam 2g BID, and Oxcarbazepine 300 mg BID now managed by Dr. 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 Now s/p right frontotemporal craniotomy for resection of AVM by Dr. Bo and right temporal lobectomy by Dr. Lynda Patel on 7/10. Epilepsy following for assistance in management.    Recommendations:  - Recommend to continue outpatient AED regimen: Clobazam 40 mg qhs,  Levetiracetam 2g BID, and Oxcarbazepine 300 mg BID  - Avoid agents that lower seizure threshold  - Postoperative management per NSGY  - Management of infectious/metabolic abnormalities per NCC  - Seizure precautions    Discussed plan of care with patient and NCC team. No family at bedside. Will follow, please call with questions.    AVM (arteriovenous malformation) brain  - SM1 right temporal AVM s/p angiogram with kd embolization by IR on 7/9  - S/p right frontotemporal craniotomy for resection of AVM by Dr. Bo and right temporal lobectomy by Dr. Lynda Patel on 7/10  - MRI Brain WO with expected postop changes, 2 mm leftward MLS, thin extra-axial collection vs postop dural plasty material (felt more likely)  - Management per NSGY, [facility]    History of spontaneous intraparenchymal intracranial hemorrhage  - Remote histroy of right temporal intraparenchymal hemorrhage complicated by medically refractory epilepsy   - Management per [facility]    Congestive heart failure  Patient reported HFrEF  [facility] 8/2022: The left main is angiographically normal. The left anterior descending is angiographically normal. The circumflex is angiographically normal. The right coronary artery is a dominant vessel of the heart and angiographically normal. Left Ventriculography a single PATRICK ventriculogram was performed revealing an EF of 55-60%.  - Management per NCC         VTE Risk Mitigation (From admission, onward)           Ordered     heparin (porcine) injection 5,000 Units  Every 8 hours         07/10/24 1453     IP VTE HIGH RISK PATIENT  Once         07/10/24 1453     Place sequential compression device  Until discontinued         07/10/24 1453                    Kirsten Dallas PA-C  Neurology-Epilepsy  [facility]  Staff: Dr. Mead

## 2024-07-11 NOTE — NURSING
0045: pt went down for MRI. Neuro status and VS stabled. Tolerated fine.  - 0140: came back to the room. Tolerated fine,  VS and Neuro stable. Bed in lowest position and locked.call light with in reach, her  at bedside. Gave pain meds as needed to keep comfortable continue to monitor
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
## 2024-07-11 NOTE — PLAN OF CARE
Pt is not medically ready to discharge.   SW will continue to monitor pt needs.    Carey Mckenzie MSW, EMILYW  [facility]  Case Management Dept.

## 2024-07-12 LAB
ALBUMIN SERPL BCP-MCNC: 3.6 G/DL (ref 3.5–5.2)
ALP SERPL-CCNC: 93 U/L (ref 55–135)
ALT SERPL W/O P-5'-P-CCNC: 17 U/L (ref 10–44)
ANION GAP SERPL CALC-SCNC: 9 MMOL/L (ref 8–16)
AST SERPL-CCNC: 24 U/L (ref 10–40)
BASOPHILS # BLD AUTO: 0.01 K/UL (ref 0–0.2)
BASOPHILS NFR BLD: 0.1 % (ref 0–1.9)
BILIRUB SERPL-MCNC: 0.3 MG/DL (ref 0.1–1)
BUN SERPL-MCNC: 6 MG/DL (ref 8–23)
CALCIUM SERPL-MCNC: 9.8 MG/DL (ref 8.7–10.5)
CHLORIDE SERPL-SCNC: 102 MMOL/L (ref 95–110)
CO2 SERPL-SCNC: 23 MMOL/L (ref 23–29)
CREAT SERPL-MCNC: 0.7 MG/DL (ref 0.5–1.4)
DIFFERENTIAL METHOD BLD: ABNORMAL
EOSINOPHIL # BLD AUTO: 0 K/UL (ref 0–0.5)
EOSINOPHIL NFR BLD: 0 % (ref 0–8)
ERYTHROCYTE [DISTWIDTH] IN BLOOD BY AUTOMATED COUNT: 12.8 % (ref 11.5–14.5)
EST. GFR  (NO RACE VARIABLE): >60 ML/MIN/1.73 M^2
GLUCOSE SERPL-MCNC: 118 MG/DL (ref 70–110)
HCT VFR BLD AUTO: 27.2 % (ref 37–48.5)
HGB BLD-MCNC: 9 G/DL (ref 12–16)
IMM GRANULOCYTES # BLD AUTO: 0.02 K/UL (ref 0–0.04)
IMM GRANULOCYTES NFR BLD AUTO: 0.2 % (ref 0–0.5)
LYMPHOCYTES # BLD AUTO: 1.2 K/UL (ref 1–4.8)
LYMPHOCYTES NFR BLD: 11.5 % (ref 18–48)
MAGNESIUM SERPL-MCNC: 2 MG/DL (ref 1.6–2.6)
MCH RBC QN AUTO: 30.1 PG (ref 27–31)
MCHC RBC AUTO-ENTMCNC: 33.1 G/DL (ref 32–36)
MCV RBC AUTO: 91 FL (ref 82–98)
MONOCYTES # BLD AUTO: 0.8 K/UL (ref 0.3–1)
MONOCYTES NFR BLD: 8.1 % (ref 4–15)
NEUTROPHILS # BLD AUTO: 8 K/UL (ref 1.8–7.7)
NEUTROPHILS NFR BLD: 80.1 % (ref 38–73)
NRBC BLD-RTO: 0 /100 WBC
OHS QRS DURATION: 86 MS
OHS QTC CALCULATION: 426 MS
PHOSPHATE SERPL-MCNC: 2.3 MG/DL (ref 2.7–4.5)
PLATELET # BLD AUTO: 213 K/UL (ref 150–450)
PMV BLD AUTO: 9.1 FL (ref 9.2–12.9)
POTASSIUM SERPL-SCNC: 3.9 MMOL/L (ref 3.5–5.1)
PROT SERPL-MCNC: 6.8 G/DL (ref 6–8.4)
RBC # BLD AUTO: 2.99 M/UL (ref 4–5.4)
SODIUM SERPL-SCNC: 134 MMOL/L (ref 136–145)
WBC # BLD AUTO: 10.03 K/UL (ref 3.9–12.7)

## 2024-07-12 PROCEDURE — 20000000 HC ICU ROOM

## 2024-07-12 PROCEDURE — 99900035 HC TECH TIME PER 15 MIN (STAT)

## 2024-07-12 PROCEDURE — 25000003 PHARM REV CODE 250: Performed by: STUDENT IN AN ORGANIZED HEALTH CARE EDUCATION/TRAINING PROGRAM

## 2024-07-12 PROCEDURE — 25000003 PHARM REV CODE 250: Performed by: REGISTERED NURSE

## 2024-07-12 PROCEDURE — 63600175 PHARM REV CODE 636 W HCPCS: Performed by: INTERNAL MEDICINE

## 2024-07-12 PROCEDURE — 83735 ASSAY OF MAGNESIUM: CPT | Performed by: NURSE PRACTITIONER

## 2024-07-12 PROCEDURE — 83880 ASSAY OF NATRIURETIC PEPTIDE: CPT | Performed by: NURSE PRACTITIONER

## 2024-07-12 PROCEDURE — 93010 ELECTROCARDIOGRAM REPORT: CPT | Mod: ,,, | Performed by: INTERNAL MEDICINE

## 2024-07-12 PROCEDURE — 25000003 PHARM REV CODE 250: Performed by: INTERNAL MEDICINE

## 2024-07-12 PROCEDURE — 25000003 PHARM REV CODE 250: Performed by: NURSE PRACTITIONER

## 2024-07-12 PROCEDURE — 93005 ELECTROCARDIOGRAM TRACING: CPT

## 2024-07-12 PROCEDURE — 63600175 PHARM REV CODE 636 W HCPCS

## 2024-07-12 PROCEDURE — 97110 THERAPEUTIC EXERCISES: CPT

## 2024-07-12 PROCEDURE — 97116 GAIT TRAINING THERAPY: CPT

## 2024-07-12 PROCEDURE — 63600175 PHARM REV CODE 636 W HCPCS: Performed by: NURSE PRACTITIONER

## 2024-07-12 PROCEDURE — 25000003 PHARM REV CODE 250

## 2024-07-12 PROCEDURE — 97530 THERAPEUTIC ACTIVITIES: CPT

## 2024-07-12 PROCEDURE — 84100 ASSAY OF PHOSPHORUS: CPT | Performed by: NURSE PRACTITIONER

## 2024-07-12 PROCEDURE — 80053 COMPREHEN METABOLIC PANEL: CPT | Performed by: NURSE PRACTITIONER

## 2024-07-12 PROCEDURE — 99233 SBSQ HOSP IP/OBS HIGH 50: CPT | Mod: FS,,, | Performed by: INTERNAL MEDICINE

## 2024-07-12 PROCEDURE — 85025 COMPLETE CBC W/AUTO DIFF WBC: CPT

## 2024-07-12 PROCEDURE — 97535 SELF CARE MNGMENT TRAINING: CPT

## 2024-07-12 PROCEDURE — 94761 N-INVAS EAR/PLS OXIMETRY MLT: CPT

## 2024-07-12 RX ORDER — BISACODYL 10 MG/1
10 SUPPOSITORY RECTAL DAILY PRN
Status: DISCONTINUED | OUTPATIENT
Start: 2024-07-12 | End: 2024-07-17 | Stop reason: HOSPADM

## 2024-07-12 RX ORDER — ONDANSETRON HYDROCHLORIDE 2 MG/ML
4 INJECTION, SOLUTION INTRAVENOUS EVERY 8 HOURS PRN
Status: DISCONTINUED | OUTPATIENT
Start: 2024-07-12 | End: 2024-07-17 | Stop reason: HOSPADM

## 2024-07-12 RX ORDER — ACETAMINOPHEN 325 MG/1
650 TABLET ORAL EVERY 6 HOURS PRN
Status: DISCONTINUED | OUTPATIENT
Start: 2024-07-12 | End: 2024-07-17 | Stop reason: HOSPADM

## 2024-07-12 RX ADMIN — OXYCODONE HYDROCHLORIDE 5 MG: 5 TABLET ORAL at 04:07

## 2024-07-12 RX ADMIN — HEPARIN SODIUM 5000 UNITS: 5000 INJECTION INTRAVENOUS; SUBCUTANEOUS at 05:07

## 2024-07-12 RX ADMIN — ONDANSETRON 4 MG: 2 INJECTION INTRAMUSCULAR; INTRAVENOUS at 07:07

## 2024-07-12 RX ADMIN — SENNOSIDES AND DOCUSATE SODIUM 1 TABLET: 50; 8.6 TABLET ORAL at 09:07

## 2024-07-12 RX ADMIN — BISACODYL 10 MG: 10 SUPPOSITORY RECTAL at 04:07

## 2024-07-12 RX ADMIN — OXYCODONE HYDROCHLORIDE 5 MG: 5 TABLET ORAL at 08:07

## 2024-07-12 RX ADMIN — OXCARBAZEPINE 300 MG: 300 TABLET, FILM COATED ORAL at 09:07

## 2024-07-12 RX ADMIN — DEXTROSE MONOHYDRATE 1 G: 2.5 INJECTION INTRAVENOUS at 02:07

## 2024-07-12 RX ADMIN — CLOBAZAM 40 MG: 10 TABLET ORAL at 09:07

## 2024-07-12 RX ADMIN — ACETAMINOPHEN 650 MG: 325 TABLET ORAL at 10:07

## 2024-07-12 RX ADMIN — NIFEDIPINE 30 MG: 30 TABLET, FILM COATED, EXTENDED RELEASE ORAL at 09:07

## 2024-07-12 RX ADMIN — POTASSIUM & SODIUM PHOSPHATES POWDER PACK 280-160-250 MG 2 PACKET: 280-160-250 PACK at 11:07

## 2024-07-12 RX ADMIN — DEXTROSE MONOHYDRATE 1 G: 2.5 INJECTION INTRAVENOUS at 07:07

## 2024-07-12 RX ADMIN — ACETAMINOPHEN 650 MG: 325 TABLET ORAL at 07:07

## 2024-07-12 RX ADMIN — HEPARIN SODIUM 5000 UNITS: 5000 INJECTION INTRAVENOUS; SUBCUTANEOUS at 02:07

## 2024-07-12 RX ADMIN — LOSARTAN POTASSIUM 50 MG: 50 TABLET, FILM COATED ORAL at 09:07

## 2024-07-12 RX ADMIN — HEPARIN SODIUM 5000 UNITS: 5000 INJECTION INTRAVENOUS; SUBCUTANEOUS at 10:07

## 2024-07-12 RX ADMIN — POTASSIUM & SODIUM PHOSPHATES POWDER PACK 280-160-250 MG 2 PACKET: 280-160-250 PACK at 04:07

## 2024-07-12 RX ADMIN — SODIUM CHLORIDE 500 ML: 9 INJECTION, SOLUTION INTRAVENOUS at 06:07

## 2024-07-12 RX ADMIN — LEVETIRACETAM 2000 MG: 750 TABLET, FILM COATED ORAL at 09:07

## 2024-07-12 RX ADMIN — DEXTROSE MONOHYDRATE 1 G: 2.5 INJECTION INTRAVENOUS at 11:07

## 2024-07-12 RX ADMIN — ACETAMINOPHEN 650 MG: 325 TABLET ORAL at 01:07

## 2024-07-12 RX ADMIN — POLYETHYLENE GLYCOL 3350 17 G: 17 POWDER, FOR SOLUTION ORAL at 09:07

## 2024-07-12 NOTE — ASSESSMENT & PLAN NOTE
-Home AEDs resumed, see below  -Oxcarbazepine 300 BID  -Clobazam 40 daily  -Keppra 2g BID  -Seizure precautions

## 2024-07-12 NOTE — PLAN OF CARE
MOHINI met with pt and pt LISA Marti at bedside.  SW discussed discharge planning with pt.   Pt is not medically ready to discharge but may step down.  Pt indicated that she has a lot of support around her and may not need additional assistance.   SW will continue to monitor pt needs.      Carey Mckenzie MSW, EMILYW  [facility]  Case Management Dept.
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
## 2024-07-12 NOTE — SUBJECTIVE & OBJECTIVE
Interval History:  See hospital course.     Review of Systems   Respiratory:  Negative for cough and shortness of breath.    Cardiovascular:  Positive for chest pain.   Gastrointestinal:  Positive for nausea. Negative for abdominal pain and vomiting.   Neurological:  Positive for headaches.     Objective:     Vitals:  Temp: 98.7 °F (37.1 °C)  Pulse: 76  Rhythm: normal sinus rhythm  BP: 100/67  MAP (mmHg): 79  Resp: (!) 23  SpO2: 100 %    Temp  Min: 98.2 °F (36.8 °C)  Max: 99.8 °F (37.7 °C)  Pulse  Min: 73  Max: 87  BP  Min: 94/52  Max: 135/67  MAP (mmHg)  Min: 67  Max: 99  Resp  Min: 13  Max: 26  SpO2  Min: 97 %  Max: 100 %    07/11 0701 - 07/12 0700  In: 1768.5 [P.O.:1168; I.V.:432.2]  Out: 2501 [Urine:2450; Drains:51]   Unmeasured Output  Stool Occurrence: 0        Physical Exam  Vitals and nursing note reviewed.   HENT:      Head:      Comments: R crani incision CDI; R facial swelling  Eyes:      Extraocular Movements: Extraocular movements intact.      Pupils: Pupils are equal, round, and reactive to light.   Cardiovascular:      Rate and Rhythm: Normal rate and regular rhythm.      Pulses: Normal pulses.   Pulmonary:      Effort: Pulmonary effort is normal.   Abdominal:      Palpations: Abdomen is soft.   Musculoskeletal:         General: Normal range of motion.      Cervical back: Normal range of motion.   Skin:     General: Skin is warm and dry.      Capillary Refill: Capillary refill takes less than 2 seconds.   Neurological:      Mental Status: She is alert and oriented to person, place, and time.      GCS: GCS eye subscore is 4. GCS verbal subscore is 5. GCS motor subscore is 6.      Cranial Nerves: Cranial nerves 2-12 are intact.      Sensory: Sensation is intact.      Motor: Weakness (Generalized) present.      Comments:   -E4 V5 M6  -PERRL  -Oriented to person, place, time, and situation  -Follows commands w/ all extremities  -JOEL spontaneously/purposefully  -RUE 4/5  -LUE 4/5  -RLE 4/5  -LLE 4/5  -Gait  deferred            Medications:  Continuous ScheduledceFAZolin (Ancef) IV (PEDS and ADULTS), 1 g, Q8H  cloBAZam, 40 mg, QHS  heparin (porcine), 5,000 Units, Q8H  levETIRAcetam, 2,000 mg, BID  losartan, 50 mg, Daily  NIFEdipine, 30 mg, BID  OXcarbazepine, 300 mg, BID  polyethylene glycol, 17 g, Daily  senna-docusate 8.6-50 mg, 1 tablet, BID    PRNacetaminophen, 650 mg, Q6H PRN  dextrose 10%, 12.5 g, PRN  dextrose 10%, 25 g, PRN  glucagon (human recombinant), 1 mg, PRN  glucose, 16 g, PRN  glucose, 24 g, PRN  magnesium oxide, 800 mg, PRN  magnesium oxide, 800 mg, PRN  ondansetron, 4 mg, Q8H PRN  oxyCODONE, 5 mg, Q4H PRN  potassium bicarbonate, 35 mEq, PRN  potassium bicarbonate, 50 mEq, PRN  potassium bicarbonate, 60 mEq, PRN  potassium, sodium phosphates, 2 packet, PRN  potassium, sodium phosphates, 2 packet, PRN  potassium, sodium phosphates, 2 packet, PRN  promethazine (PHENERGAN) 12.5 mg in 0.9% NaCl 50 mL IVPB, 12.5 mg, Q6H PRN  sodium chloride 0.9%, 10 mL, PRN      Today I personally reviewed pertinent medications, lines/drains/airways, imaging, cardiology results, laboratory results, notably: CBC; CMP; MRI; CTH    Diet  Diet Adult Regular  Diet Adult Regular

## 2024-07-12 NOTE — HOSPITAL COURSE
7/11: Patient doing well. No new deficits appreciated. MRI satisfactory. Continue home AEDs. Cardiac care per NCC   07/12/2024: POD 2. NAEON. AFVSS. Exam stable. Pain well controlled. Minimal PO intake yesterday, one episode of emesis. Voiding spontaneously and passing flatus. SG HV in place.  7/13: POD 3. NAEON. AFVSS. Exam stable. Pain controlled. Drain removed yesterday.   7/14: NAEON. AFVSS. Exam stable. Chest pain yesterday; workup negative. Seen by cardiology. Drain removed.   7/15: NAEON. AFVSS. Echo today. C/o chest pain again, relieved with nitro ordered per cardiology recs. Participated with therapy. Exam stable. Bowel regimen advanced.    The mobility limitation cannot be sufficiently resolved by the use of a cane. Patient's functional mobility deficit can be sufficiently resolved with the use of a rolling walker. Patient's mobility limitation significantly impairs their ability to participate in one of more activities of daily living. The use of a rolling walker will significantly improve the patient's ability to participate in MRADLS and the patient will use it on regular basis in the home.     7/16:

## 2024-07-12 NOTE — PLAN OF CARE
[facility] Care Plan  POC reviewed with Haydee Linda and family at 1400. Patient verbalized understanding. Questions and concerns addressed. No acute events today. Pt progressing toward goals. Will continue to monitor. See below and flowsheets for full assessment and VS info.             Is this a stroke patient? no    Neuro:  Slemp Coma Scale  Best Eye Response: 4-->(E4) spontaneous  Best Motor Response: 6-->(M6) obeys commands  Best Verbal Response: 5-->(V5) oriented  Coral Coma Scale Score: 15  Assessment Qualifiers: no eye obstruction present  Pupil PERRLA: yes     24 hr Temp:  [98.2 °F (36.8 °C)-99.8 °F (37.7 °C)]     CV:   Rhythm: normal sinus rhythm  BP goals:   SBP < 160  MAP > 65    Resp:           Plan: N/A    GI/:     Diet/Nutrition Received: regular  Last Bowel Movement: 24  Voiding Characteristics: ureteral catheter    Intake/Output Summary (Last 24 hours) at 2024 1634  Last data filed at 2024 1549  Gross per 24 hour   Intake 1433.59 ml   Output 2221 ml   Net -787.41 ml     Unmeasured Output  Stool Occurrence: 0    Labs/Accuchecks:  Recent Labs   Lab 24  0117   WBC 10.03   RBC 2.99*   HGB 9.0*   HCT 27.2*         Recent Labs   Lab 24  0117   *   K 3.9   CO2 23      BUN 6*   CREATININE 0.7   ALKPHOS 93   ALT 17   AST 24   BILITOT 0.3      Recent Labs   Lab 24  1243 07/10/24  0655   INR 0.9  --    APTT  --  30.8      Recent Labs   Lab 24  0217   TROPONINI <0.006       Electrolytes: N/A - electrolytes WDL  Accuchecks: none    Gtts:      LDA/Wounds:      Nurses Note -- 4 Eyes    Is there altered skin present?   Second RN/Staff Member:  JAYLENE Wolfe      Restraints:   Restraint Order  Length of Order: Order good for next 24 hours or when removed.  Date that the current order will : 24  Time that the current order will :   Order Upon Application: Yes    [facility]

## 2024-07-12 NOTE — ASSESSMENT & PLAN NOTE
68 yo female who presented for elective AVM embolization on 7/9 and anterior temporal lobectomy on 7/10.    CTH: Resection of REE. No new acute hemorrhages  MRI: resection of both REE and AVM. No acute hydrocephalus or hemorrhage.    Recommendations:  --1 SG drain to full compression; will remove today.     -Ancef while drain in place  --SBP<140  --PT/OT  --Restarted on home AEDs  --PT/OT/OOB  --LUIS ENRIQUE/SCD/LVX  --Please call NSGY with any acute decline in neurological exam    Discussed with Dr. Patel  Dispo: ICU

## 2024-07-12 NOTE — ASSESSMENT & PLAN NOTE
-Home AEDs resumed, see below  -Oxcarbazepine 300 BID  -Clobazam 40 daily  -Keppra 2g BID  -Seizure precautions  -Epilepsy following, appreciate recs

## 2024-07-12 NOTE — PT/OT/SLP PROGRESS
Physical Therapy Co-Treatment    Patient Name:  Haydee Linda   MRN:  [id number]    Recommendations:     Discharge Recommendations: High Intensity Therapy  Discharge Equipment Recommendations: bedside commode, walker, rolling  Barriers to discharge: None    Assessment:     Haydee Linda is a 69 y.o. female admitted with a medical diagnosis of AVM (arteriovenous malformation) brain. Patient presented with increased motivation to participate in first follow-up treatment session, with good response to completed activities and provided education. Patient demonstrated good progress, evidenced by bed mobility being less effortful for patient and completion 2 gait trials this date. Patient initially requiring 2 person assistance secondary to balance deficits and L knee buckling, however able to decrease to 1 person. Implemented rolling walker during 2nd gait trial in which further decrease in physical assistance noted & L knee stabilization not required; anticipate improvements related to patient utilizing UEs to offload LE WB. Recommend continued AD assessment of in subsequent sessions given novelty of rolling walker. Patient has demonstrated sufficient progression to warrant high intensity therapy evidenced by objectives noted below. Performance deficits impacting function include weakness, impaired endurance, impaired functional mobility, gait instability, impaired balance, decreased upper extremity function, decreased lower extremity function, decreased safety awareness, pain, impaired coordination.    Rehab Prognosis: Good; patient would benefit from acute skilled PT services to address these deficits and reach maximum level of function.    Recent Surgery: * No procedures listed * 3 Days Post-Op    Plan:     During this hospitalization, patient to be seen 4 x/week to address the identified rehab impairments via gait training, therapeutic activities, therapeutic exercises, neuromuscular re-education and  "progress toward the following goals:    Plan of Care Expires:  08/11/24    Subjective     Chief Complaint: pain  Patient/Family Comments/goals: "It feels good to move. I've been stuck in the bed for days."  Pain/Comfort:  Pain Rating 1: intensity not provided  Location - Side 1: Bilateral  Location 1: head  Pain Addressed 1: Reposition, Distraction, Pre-medicate for activity  Pain Rating Post-Intervention 1: intensity not provided      Objective:     Communicated with Nsg prior to session.  Patient found HOB elevated with blood pressure cuff, oxygen, PureWick, pulse ox (continuous), SCD, telemetry upon PT entry to room.     General Precautions: Standard, fall   Orthopedic Precautions:N/A   Braces: N/A   Body mass index is 25.41 kg/m².  Oxygen Device: Nasal Cannula 2L. NC doffed upon entry with SpO2 WNL.  Vitals: /68   Pulse 80   Temp 98.2 °F (36.8 °C) (Oral)   Resp 11   Ht 5' 8" (1.727 m)   Wt 75.8 kg (167 lb 1.7 oz)   SpO2 99%   Breastfeeding No   BMI 25.41 kg/m²      Functional Mobility:  Bed Mobility:     EOB Scooting:   Anterior: Stand-By Assistance  Supine>Sit: x1, Stand-By Assistance with HOB Elevated    Transfers:     Sit<>Stand:   x1, Minimal Assistancex2 from Edge of Bed with HHAx2  x1, Minimal Assistancex2 from Bedside Chair with Rolling Walker  Bed>Chair: x1, Moderate Assistance with Rolling Walker using step transfer technique to R. 2nd person to guide hips  *VC/TC for upright posturing, glute engagement, L quad engagement, anterior translation of VINCE, task sequencing, UE placement, AD management  *Facilitation of hip extension, L knee stabilization, AD management    Gait:  x2ft, Moderate Assistancex2 progressing to Moderate Assistance with HHAx1-2  x8ft, Minimal Assistance with Rolling Walker  Gait Assessment: decreased step height, decreased gait speed, narrow base of support, impaired L TKE  Total Distance: 10ft  *VC/TC for upright posturing, glute engagement, L quad activation during " stance, step sequencing, AD management  *Facilitation of hip extension, L knee stabilization, AD management - emphasis on change of direction    Balance:   Static Sitting: Stand-By Assistance  Dynamic Sitting: Stand-By Assistance    Static Standing: Contact Guard Assistance - Minimal Assistance  Dynamic Standing: Minimal Assistance - Moderate Assistancex2  *VC/TC for upright posturing, glute engagement, L quad engagement, anterior translation of VINCE  *Facilitation of trunk extension, hip extension, L knee stabilization    AM-PAC 6 CLICK MOBILITY  Turning over in bed (including adjusting bedclothes, sheets and blankets)?: 4  Sitting down on and standing up from a chair with arms (e.g., wheelchair, bedside commode, etc.): 2  Moving from lying on back to sitting on the side of the bed?: 3  Moving to and from a bed to a chair (including a wheelchair)?: 3  Need to walk in hospital room?: 3  Climbing 3-5 steps with a railing?: 2  Basic Mobility Total Score: 17     Treatment & Education:  Reviewed exercises for patient to complete while seated [facility], including:  Marches: x4 alternating L/R  LAQ: x2 L/R  Hip Abd/Add AROM: x3 B  Ankle Pumps    Patient Education Provided on:  The role of physical therapy and how the patient can benefit from skilled services  The negative effects of prolonged bed rest/sedentary behavior, along with the importance of OOB activity & patient participation with PT  The importance of contacting RN, via call light, for mobility throughout the day  Pt white board updated with current therapists name and level of mobility assistance needed.     Patient Verbalized understanding of all topics touched on this date. All patient questions answered within the PT scope of practice    Patient left up in chair with all lines intact, call button in reach, and RN notified.    GOALS:   Multidisciplinary Problems       Physical Therapy Goals          Problem: Physical Therapy    Goal Priority Disciplines Outcome  Goal Variances Interventions   Physical Therapy Goal     PT, PT/OT Progressing     Description: Goals to be met by: 24    Patient will increase functional independence with mobility by performin. Supine to sit with [place]  2. Sit to supine with [place]  3. Sit to stand transfer with Supervision with or without LRAD  4. Gait  x 20 feet with Contact Guard Assistance with or without LRAD   5. Ascend/descend 2 stair with Minimal Assistance with or without LRAD   6. Sitting at edge of bed 10 minutes with [place]  7. Stand for 3 minutes with Modified [place] with or without LRAD  8. Lower extremity exercise program x20 reps per handout, with assistance as needed                         Time Tracking:     PT Received On: 24  PT Start Time: 1433     PT Stop Time: 1514  PT Total Time (min): 41 min     Billable Minutes: Gait Training 13 and Therapeutic Activity 26    Treatment Type: Treatment  PT/PTA: PT     Number of PTA visits since last PT visit: 0     2024

## 2024-07-12 NOTE — ASSESSMENT & PLAN NOTE
Secondary seizure disorder  69F PMHx of HFrEF and right temporal intraparenchymal hemorrhage complicated by medically refractory epilepsy currently maintained on Clobazam 40 mg qhs, Levetiracetam 2g BID, and Oxcarbazepine 300 mg BID now managed by Dr. 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 Now s/p right frontotemporal craniotomy for resection of AVM by Dr. Bo and right temporal lobectomy by Dr. Lynda Patel on 7/10. Epilepsy following for assistance in management.    Recommendations:  - Recommend to continue outpatient AED regimen: Clobazam 40 mg qhs, Levetiracetam 2g BID, and Oxcarbazepine 300 mg BID  - Avoid agents that lower seizure threshold  - Postoperative management per NSGY  - Management of infectious/metabolic abnormalities per NCC  - Seizure precautions    Discussed plan of care with NCC team, patient, and SO at bedside. Will follow, please call with questions.

## 2024-07-12 NOTE — PT/OT/SLP PROGRESS
Occupational Therapy   Co-Treatment w/ PT  Co-treat performed due to acuity and complexity of pt's medical status with 2 skilled disciplines needed to optimize pts functional performance in ICU setting.      Name: Haydee Linda  MRN: [id number]  Admitting Diagnosis:  AVM (arteriovenous malformation) brain  3 Days Post-Op    Recommendations:     Discharge Recommendations: High Intensity Therapy  Discharge Equipment Recommendations:   (TBD pending pt progress)  Barriers to discharge:    increased level of assistance required  for mobility and ADLs, requires skilled intervention    Assessment:     Haydee Linda is a 69 y.o. female with a medical diagnosis of AVM (arteriovenous malformation) brain.  Pt tolerated treatment well without incident.  Pt up to chair this date tolerating  BUE exercises and self care. Pt reporting mild dizziness while in standing with BP remaining stable  throughout session.  Pt is progressing towards goals, however would benefit from continued skilled OT for improved coordination, strength, and activity tolerance neccessary for safe ADL completion  to maximize QOL and functional independence. She presents with deficits listed below. Performance deficits affecting function are weakness, impaired endurance, impaired functional mobility, gait instability, impaired self care skills, impaired balance, impaired coordination, decreased upper extremity function, decreased lower extremity function, decreased safety awareness.     Rehab Prognosis:  Good; patient would benefit from acute skilled OT services to address these deficits and reach maximum level of function.       Plan:     Patient to be seen 4 x/week to address the above listed problems via self-care/home management, therapeutic activities, therapeutic exercises, neuromuscular re-education  Plan of Care Expires: 08/11/24  Plan of Care Reviewed with: patient    Subjective     Chief Complaint: None verbalized  Patient/Family  Comments/goals:  improve mobility and functional performance.   Pain/Comfort:  Pain Rating 1: 0/10  Pain Rating Post-Intervention 1: 0/10    Objective:     Communicated with: RN prior to session.  Patient found HOB elevated with blood pressure cuff, telemetry, PureWick, oxygen, pulse ox (continuous) upon OT entry to room.    General Precautions: Standard, fall    Orthopedic Precautions:N/A  Braces: N/A  Respiratory Status: Nasal cannula, flow 2 L/min, doffed, Pt on Room Air reading 97 % SPO2    BP- 103/72 - while seated after stand  114/60- at conclusion of session while seated in bedside chair.      Occupational Performance:     Bed Mobility:    Patient completed Supine to Sit with stand by assistance     Functional Mobility/Transfers:  Patient completed Sit <> Stand Transfer with minimum assistance and of 2 persons  with  rolling walker   Patient completed Bed <> Chair Transfer using Step Transfer technique with moderate assistance with rolling walker w/ 2nd person to guide Pt hips to chair for safety.   Functional Mobility:  Pt with 2 walking trails. 1st trial with Mod of 2 person and HHA for ~2' .  Pt requiring Min A w/ RW walking 8'      Activities of Daily Living:  Grooming: stand by assistance with set up provided for oral hygiene and facial care while seated up in chair.       [facility] 6 Click ADL: 21    Treatment & Education:  Role of OT and OT POC  Fall Prevention/Safety Awareness Training - RW management during functional mobility and standing ADLs to decrease risk of falls  Transfer Training on proper hand placement  during t/fs to reduce fall risk.   Gentle  AROM BUE exercises  (arm raises, anterior punches, scapular retraction for stretching)  Educated on OOB activity and impact on increasing functional independence.  Educated on importance of progressive mobilization to increase strength and endurance.      Patient left up in chair with all lines intact, call button in reach, and NSG present    GOALS:    Multidisciplinary Problems       Occupational Therapy Goals          Problem: Occupational Therapy    Goal Priority Disciplines Outcome Interventions   Occupational Therapy Goal     OT, PT/OT Progressing    Description: Goals to be met by: 8/11/2024     Patient will increase functional independence with ADLs by performing:    UE Dressing with Minimal Assistance.  LE Dressing with Set-up Assistance.  Grooming while standing at sink with Supervision.  Toileting from toilet with Supervision for hygiene and clothing management.   Step transfer with Contact Guard Assistance  Toilet transfer to toilet with Supervision.                         Time Tracking:     OT Date of Treatment: 07/12/24  OT Start Time: 1433  OT Stop Time: 1514  OT Total Time (min): 41 min    Billable Minutes:Self Care/Home Management 13  Therapeutic Activity 13  Therapeutic Exercise 13    OT/SEAMUS: OT          7/12/2024

## 2024-07-12 NOTE — PLAN OF CARE
Problem: Physical Therapy  Goal: Physical Therapy Goal  Description: Goals to be met by: 24    Patient will increase functional independence with mobility by performin. Supine to sit with [place]  2. Sit to supine with [place]  3. Sit to stand transfer with Supervision with or without LRAD  4. Gait  x 20 feet with Contact Guard Assistance with or without LRAD   5. Ascend/descend 2 stair with Minimal Assistance with or without LRAD   6. Sitting at edge of bed 10 minutes with [place]  7. Stand for 3 minutes with Modified [place] with or without LRAD  8. Lower extremity exercise program x20 reps per handout, with assistance as needed    Outcome: Progressing
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
## 2024-07-12 NOTE — ASSESSMENT & PLAN NOTE
Patient reported HFrEF  [facility] 8/2022: The left main is angiographically normal. The left anterior descending is angiographically normal. The circumflex is angiographically normal. The right coronary artery is a dominant vessel of the heart and angiographically normal. Left Ventriculography a single PATRICK ventriculogram was performed revealing an EF of 55-60%.  - Management per NCC

## 2024-07-12 NOTE — PLAN OF CARE
[facility] Care Plan    POC reviewed with Haydee Linda and family at 0300. Patient and Family verbalized understanding. Questions and concerns addressed. PRN pain meds gave around o'clock to keep comfortable, replaced phos. Gave bath and changed linens. Pt progressing toward goals. Will continue to monitor. See below and flowsheets for full assessment and VS info.      - Gave bolus 500mL of NS.   - SG drain put out 21 cc over night      Is this a stroke patient? no    Neuro:  [place] Coma Scale  Best Eye Response: 3-->(E3) to speech  Best Motor Response: 6-->(M6) obeys commands  Best Verbal Response: 5-->(V5) oriented  [place] Coma Scale Score: 14  Assessment Qualifiers: patient not sedated/intubated  Pupil PERRLA: yes     24 hr Temp:  [98.2 °F (36.8 °C)-99.8 °F (37.7 °C)]     CV:   Rhythm: normal sinus rhythm  BP goals:   SBP < 140  MAP > 65    Resp: room air          Plan: N/A    GI/:     Diet/Nutrition Received: regular  Last Bowel Movement: 07/08/24  Voiding Characteristics: ureteral catheter    Intake/Output Summary (Last 24 hours) at 7/12/2024 0725  Last data filed at 7/12/2024 0645  Gross per 24 hour   Intake 1768.45 ml   Output 2501 ml   Net -732.55 ml     Unmeasured Output  Stool Occurrence: 0    Labs/Accuchecks:  Recent Labs   Lab 07/12/24  0117   WBC 10.03   RBC 2.99*   HGB 9.0*   HCT 27.2*         Recent Labs   Lab 07/12/24  0117   *   K 3.9   CO2 23      BUN 6*   CREATININE 0.7   ALKPHOS 93   ALT 17   AST 24   BILITOT 0.3      Recent Labs   Lab 07/09/24  1243 07/10/24  0655   INR 0.9  --    APTT  --  30.8      Recent Labs   Lab 07/11/24  0217   TROPONINI <0.006       Electrolytes: Electrolytes replaced  Accuchecks: none          LDA/Wounds:      Nurses Note -- 4 Eyes    Is there altered skin present? no    Second RN/Staff Member: JAYLENE Hall

## 2024-07-12 NOTE — ASSESSMENT & PLAN NOTE
68 y/o F recently diagnosed SM-1 right temporal AVM. S/p AVM embolization on 7/9 and anterior temporal lobectomy on 7/10.    -Admitted to NCC  -VS/Neuro checks q1  -NSGY following  -HOB >/= 30  -Home AEDs resumed  -Regular diet  -Monitor I/O  -CBC, CMP, Mag, Phos daily  -SCDs/SQH for DVT PPX  -PT/OT  -Likely stepdown to NSGY tomorrow

## 2024-07-12 NOTE — PROGRESS NOTES
[facility] - Neuro Critical Care  Neurology-Epilepsy  Progress Note    Patient Name: Haydee Linda  MRN: [id number]  Admission Date: 7/9/2024  Hospital Length of Stay: 3 days  Code Status: Full Code   Attending Provider: Moe Phillips MD  Primary Care Physician: No, Primary Doctor   Principal Problem:Refractory epilepsy    Subjective:     Hospital Course:   7/10>7/11: S/p right frontotemporal craniotomy for resection of AVM by Dr. Bo and right temporal lobectomy by Dr. Lynda Patel on 7/10. Epilepsy following for assistance in management. Recommend to continue outpatient AED regimen: Clobazam 40 mg qhs, Levetiracetam 2g BID, and Oxcarbazepine 300 mg BID.  7/11>7/12: Patient doing well postoperatively. No clinical seizures. Recommend to continue AED regimen.    Interval History: NAEON. This AM, patient sitting upright in bed with SO at bedside. Patient endorses mild headache. Answered questions at bedside.    Current Facility-Administered Medications   Medication Dose Route Frequency Provider Last Rate Last Admin    acetaminophen tablet 650 mg  650 mg Oral Q6H PRN Candelario Summers, NP   650 mg at 07/12/24 0738    ceFAZolin (ANCEF) 1 g in D5W 50 mL IVPB (MB+)  1 g Intravenous Q8H Jose Venegas MD   Stopped at 07/12/24 0328    cloBAZam Tab 40 mg  40 mg Oral QHS Leti Lopez NP   40 mg at 07/11/24 2103    dextrose 10% bolus 125 mL 125 mL  12.5 g Intravenous PRN Gosia Ng PA-C        dextrose 10% bolus 250 mL 250 mL  25 g Intravenous PRN Gosia Ng PA-C        glucagon (human recombinant) injection 1 mg  1 mg Intramuscular PRN Gosia Ng PA-C        glucose chewable tablet 16 g  16 g Oral PRN Gosia Ng PA-C        glucose chewable tablet 24 g  24 g Oral PRN Gosia Ng PA-C        heparin (porcine) injection 5,000 Units  5,000 Units Subcutaneous Q8H Francisco Scruggs MD   5,000 Units at 07/12/24 0536    levETIRAcetam tablet 2,000 mg  2,000 mg Oral BID Love, Leti L, NP   2,000 mg at 07/12/24 0963     losartan tablet 50 mg  50 mg Oral Daily Love, Leti L, NP   50 mg at 07/12/24 0950    magnesium oxide tablet 800 mg  800 mg Oral PRN Love, Leti L, NP        magnesium oxide tablet 800 mg  800 mg Oral PRN Love, Leti L, NP        NIFEdipine 24 hr tablet 30 mg  30 mg Oral BID Love, Leti L, NP   30 mg at 07/12/24 0950    ondansetron injection 4 mg  4 mg Intravenous Q8H PRN Candelario Summers, NP   4 mg at 07/12/24 0731    OXcarbazepine tablet 300 mg  300 mg Oral BID Love, Leti L, NP   300 mg at 07/12/24 0949    oxyCODONE immediate release tablet 5 mg  5 mg Oral Q4H PRN Walter Wilkins PA-C   5 mg at 07/12/24 0430    polyethylene glycol packet 17 g  17 g Oral Daily Stephanie Blair NP   17 g at 07/12/24 0949    potassium bicarbonate disintegrating tablet 35 mEq  35 mEq Oral PRN Love, Leti L, NP        potassium bicarbonate disintegrating tablet 50 mEq  50 mEq Oral PRN Love, Leti L, NP        potassium bicarbonate disintegrating tablet 60 mEq  60 mEq Oral PRN Love, Leti L, NP        potassium, sodium phosphates 280-160-250 mg packet 2 packet  2 packet Oral PRN Love, Leti L, NP   2 packet at 07/12/24 0427    potassium, sodium phosphates 280-160-250 mg packet 2 packet  2 packet Oral PRN Love, Leti L, NP        potassium, sodium phosphates 280-160-250 mg packet 2 packet  2 packet Oral PRN Love, Leti L, NP        promethazine (PHENERGAN) 12.5 mg in 0.9% NaCl 50 mL IVPB  12.5 mg Intravenous Q6H PRN Candelario Summers NP        senna-docusate 8.6-50 mg per tablet 1 tablet  1 tablet Oral BID Love, Leti L, NP   1 tablet at 07/12/24 0950    sodium chloride 0.9% flush 10 mL  10 mL Intravenous PRN Moises Bonds MD         Continuous Infusions:    Review of Systems  Objective:     Vital Signs (Most Recent):  Temp: 98.7 °F (37.1 °C) (07/12/24 0700)  Pulse: 76 (07/12/24 1000)  Resp: (!) 23 (07/12/24 1000)  BP: 100/67 (07/12/24 1000)  SpO2: 100 % (07/12/24 1000) Vital Signs (24h Range):  Temp:  [98.2 °F (36.8 °C)-99.8 °F  (37.7 °C)] 98.7 °F (37.1 °C)  Pulse:  [73-87] 76  Resp:  [13-26] 23  SpO2:  [97 %-100 %] 100 %  BP: ()/(52-77) 100/67     Weight: 75.8 kg (167 lb 1.7 oz)  Body mass index is 25.41 kg/m².     Physical Exam  Constitutional:       General: She is not in acute distress.     Appearance: She is not diaphoretic.   HENT:      Head:      Comments: Dressing intact  R facial swelling  Eyes:      General: No scleral icterus.        Right eye: No discharge.         Left eye: No discharge.      Conjunctiva/sclera: Conjunctivae normal.      Pupils: Pupils are equal, round, and reactive to light.   Cardiovascular:      Rate and Rhythm: Normal rate.   Pulmonary:      Effort: Pulmonary effort is normal. No respiratory distress.   Musculoskeletal:         General: No deformity or signs of injury.   Skin:     General: Skin is warm and dry.   Psychiatric:         Speech: Speech normal.         Behavior: Behavior is not agitated. Behavior is cooperative.            NEUROLOGICAL EXAMINATION:     MENTAL STATUS   Speech: speech is normal     CRANIAL NERVES     CN III, IV, VI   Pupils are equal, round, and reactive to light.       Awake, alert, oriented  ANDERS OU   Corneal intact OU   + spontaneous eye opening   Face symmetric   Tongue midline   Moves all extremities  Follows commands    Exam findings to suggest seizures:  Myoclonus - no   eye twitching - no   Nystagmus - no   gaze deviation - no   waxy rigidity - no        Significant Labs: All pertinent lab results from the past 24 hours have been reviewed.    Significant Studies: I have reviewed all pertinent imaging results/findings within the past 24 hours.  Assessment and Plan:     * Refractory epilepsy  Secondary seizure disorder  69F PMHx of HFrEF and right temporal intraparenchymal hemorrhage complicated by medically refractory epilepsy currently maintained on Clobazam 40 mg qhs, Levetiracetam 2g BID, and Oxcarbazepine 300 mg BID now managed by Dr. 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 s/p angiogram with kd embolization by IR on 7/9. Now s/p right frontotemporal craniotomy for resection of AVM by Dr. Bo and right temporal lobectomy by Dr. Lynda Patel on 7/10. Epilepsy following for assistance in management.    Recommendations:  - Recommend to continue outpatient AED regimen: Clobazam 40 mg qhs, Levetiracetam 2g BID, and Oxcarbazepine 300 mg BID  - Avoid agents that lower seizure threshold  - Postoperative management per NSGY  - Management of infectious/metabolic abnormalities per NCC  - Seizure precautions    Discussed plan of care with [facility] team, patient, and SO at bedside. Will follow, please call with questions.    AVM (arteriovenous malformation) brain  - SM1 right temporal AVM s/p angiogram with kd embolization by IR on 7/9  - S/p right frontotemporal craniotomy for resection of AVM by Dr. Bo and right temporal lobectomy by Dr. Lynda Patel on 7/10  - MRI Brain WO with expected postop changes, 2 mm leftward MLS, thin extra-axial collection vs postop dural plasty material (felt more likely)  - Management per NSGY, [facility]    History of spontaneous intraparenchymal intracranial hemorrhage  - Remote histroy of right temporal intraparenchymal hemorrhage complicated by medically refractory epilepsy   - Management per [facility]    Congestive heart failure  Patient reported HFrEF  [facility] 8/2022: The left main is angiographically normal. The left anterior descending is angiographically normal. The circumflex is angiographically normal. The right coronary artery is a dominant vessel of the heart and angiographically normal. Left Ventriculography a single PATRICK ventriculogram was performed revealing an EF of 55-60%.  - Management per [facility]         VTE Risk Mitigation (From admission, onward)           Ordered     heparin (porcine) injection 5,000 Units  Every 8 hours         07/10/24 1453     IP VTE HIGH RISK PATIENT  Once         07/10/24 1453     Place sequential compression device  Until  discontinued         07/10/24 1456                    Kirsten Dallas PA-C  Neurology-Epilepsy  [facility]  Staff: Dr. Mead

## 2024-07-12 NOTE — SUBJECTIVE & OBJECTIVE
Interval History: NAEON. This AM, patient sitting upright in bed with SO at bedside. Patient endorses mild headache. Answered questions at bedside.    Current Facility-Administered Medications   Medication Dose Route Frequency Provider Last Rate Last Admin    acetaminophen tablet 650 mg  650 mg Oral Q6H PRN Candelario Summers, NP   650 mg at 07/12/24 0738    ceFAZolin (ANCEF) 1 g in D5W 50 mL IVPB (MB+)  1 g Intravenous Q8H Jose Venegas MD   Stopped at 07/12/24 0328    cloBAZam Tab 40 mg  40 mg Oral QHS Love, Leti L, NP   40 mg at 07/11/24 2103    dextrose 10% bolus 125 mL 125 mL  12.5 g Intravenous PRN Gosia Ng PA-C        dextrose 10% bolus 250 mL 250 mL  25 g Intravenous PRN Gosia Ng PA-C        glucagon (human recombinant) injection 1 mg  1 mg Intramuscular PRN Gosia Ng PA-C        glucose chewable tablet 16 g  16 g Oral PRN Gosia Ng PA-C        glucose chewable tablet 24 g  24 g Oral PRN Gosia Ng PA-C        heparin (porcine) injection 5,000 Units  5,000 Units Subcutaneous Q8H Francisco Scruggs MD   5,000 Units at 07/12/24 0536    levETIRAcetam tablet 2,000 mg  2,000 mg Oral BID Love, Leti L, NP   2,000 mg at 07/12/24 0950    losartan tablet 50 mg  50 mg Oral Daily Love, Leti L, NP   50 mg at 07/12/24 0950    magnesium oxide tablet 800 mg  800 mg Oral PRN Love, Leti L, NP        magnesium oxide tablet 800 mg  800 mg Oral PRN Love, Leti L, NP        NIFEdipine 24 hr tablet 30 mg  30 mg Oral BID Love, Leti L, NP   30 mg at 07/12/24 0950    ondansetron injection 4 mg  4 mg Intravenous Q8H PRN Candelario Summers, NP   4 mg at 07/12/24 0731    OXcarbazepine tablet 300 mg  300 mg Oral BID Love, Leti L, NP   300 mg at 07/12/24 0949    oxyCODONE immediate release tablet 5 mg  5 mg Oral Q4H PRN Walter Wilkins PA-C   5 mg at 07/12/24 0430    polyethylene glycol packet 17 g  17 g Oral Daily Stephanie Blair NP   17 g at 07/12/24 0949    potassium bicarbonate disintegrating tablet 35 mEq  35 mEq Oral PRN  Leti Lopez L, NP        potassium bicarbonate disintegrating tablet 50 mEq  50 mEq Oral PRN Jessica Leti L, NP        potassium bicarbonate disintegrating tablet 60 mEq  60 mEq Oral PRN Jessica Leti L, NP        potassium, sodium phosphates 280-160-250 mg packet 2 packet  2 packet Oral PRN Jessica, Leti L, NP   2 packet at 07/12/24 0427    potassium, sodium phosphates 280-160-250 mg packet 2 packet  2 packet Oral PRN Jessica Leti L, NP        potassium, sodium phosphates 280-160-250 mg packet 2 packet  2 packet Oral PRN Jessica Leti L, NP        promethazine (PHENERGAN) 12.5 mg in 0.9% NaCl 50 mL IVPB  12.5 mg Intravenous Q6H PRN Candelario Summers NP        senna-docusate 8.6-50 mg per tablet 1 tablet  1 tablet Oral BID Jessica Leti L, NP   1 tablet at 07/12/24 0950    sodium chloride 0.9% flush 10 mL  10 mL Intravenous PRN Moises Bonds MD         Continuous Infusions:    Review of Systems  Objective:     Vital Signs (Most Recent):  Temp: 98.7 °F (37.1 °C) (07/12/24 0700)  Pulse: 76 (07/12/24 1000)  Resp: (!) 23 (07/12/24 1000)  BP: 100/67 (07/12/24 1000)  SpO2: 100 % (07/12/24 1000) Vital Signs (24h Range):  Temp:  [98.2 °F (36.8 °C)-99.8 °F (37.7 °C)] 98.7 °F (37.1 °C)  Pulse:  [73-87] 76  Resp:  [13-26] 23  SpO2:  [97 %-100 %] 100 %  BP: ()/(52-77) 100/67     Weight: 75.8 kg (167 lb 1.7 oz)  Body mass index is 25.41 kg/m².     Physical Exam  Constitutional:       General: She is not in acute distress.     Appearance: She is not diaphoretic.   HENT:      Head:      Comments: Dressing intact  R facial swelling  Eyes:      General: No scleral icterus.        Right eye: No discharge.         Left eye: No discharge.      Conjunctiva/sclera: Conjunctivae normal.      Pupils: Pupils are equal, round, and reactive to light.   Cardiovascular:      Rate and Rhythm: Normal rate.   Pulmonary:      Effort: Pulmonary effort is normal. No respiratory distress.   Musculoskeletal:         General: No deformity or signs of  injury.   Skin:     General: Skin is warm and dry.   Psychiatric:         Speech: Speech normal.         Behavior: Behavior is not agitated. Behavior is cooperative.            NEUROLOGICAL EXAMINATION:     MENTAL STATUS   Speech: speech is normal     CRANIAL NERVES     CN III, IV, VI   Pupils are equal, round, and reactive to light.       Awake, alert, oriented  ANDERS OU   Corneal intact OU   + spontaneous eye opening   Face symmetric   Tongue midline   Moves all extremities  Follows commands    Exam findings to suggest seizures:  Myoclonus - no   eye twitching - no   Nystagmus - no   gaze deviation - no   waxy rigidity - no        Significant Labs: All pertinent lab results from the past 24 hours have been reviewed.    Significant Studies: I have reviewed all pertinent imaging results/findings within the past 24 hours.

## 2024-07-13 LAB
ALBUMIN SERPL BCP-MCNC: 3.3 G/DL (ref 3.5–5.2)
ALP SERPL-CCNC: 83 U/L (ref 55–135)
ALT SERPL W/O P-5'-P-CCNC: 12 U/L (ref 10–44)
ANION GAP SERPL CALC-SCNC: 6 MMOL/L (ref 8–16)
AST SERPL-CCNC: 17 U/L (ref 10–40)
BASOPHILS # BLD AUTO: 0.05 K/UL (ref 0–0.2)
BASOPHILS NFR BLD: 0.5 % (ref 0–1.9)
BILIRUB SERPL-MCNC: 0.3 MG/DL (ref 0.1–1)
BLD PROD TYP BPU: NORMAL
BLD PROD TYP BPU: NORMAL
BLOOD UNIT EXPIRATION DATE: NORMAL
BLOOD UNIT EXPIRATION DATE: NORMAL
BLOOD UNIT TYPE CODE: 6200
BLOOD UNIT TYPE CODE: 6200
BLOOD UNIT TYPE: NORMAL
BLOOD UNIT TYPE: NORMAL
BNP SERPL-MCNC: 57 PG/ML (ref 0–99)
BUN SERPL-MCNC: 7 MG/DL (ref 8–23)
CALCIUM SERPL-MCNC: 9.5 MG/DL (ref 8.7–10.5)
CHLORIDE SERPL-SCNC: 101 MMOL/L (ref 95–110)
CO2 SERPL-SCNC: 27 MMOL/L (ref 23–29)
CODING SYSTEM: NORMAL
CODING SYSTEM: NORMAL
CREAT SERPL-MCNC: 0.8 MG/DL (ref 0.5–1.4)
CROSSMATCH INTERPRETATION: NORMAL
CROSSMATCH INTERPRETATION: NORMAL
DIFFERENTIAL METHOD BLD: ABNORMAL
DISPENSE STATUS: NORMAL
DISPENSE STATUS: NORMAL
EOSINOPHIL # BLD AUTO: 0 K/UL (ref 0–0.5)
EOSINOPHIL NFR BLD: 0.2 % (ref 0–8)
ERYTHROCYTE [DISTWIDTH] IN BLOOD BY AUTOMATED COUNT: 12.9 % (ref 11.5–14.5)
EST. GFR  (NO RACE VARIABLE): >60 ML/MIN/1.73 M^2
GLUCOSE SERPL-MCNC: 104 MG/DL (ref 70–110)
HCT VFR BLD AUTO: 26.1 % (ref 37–48.5)
HGB BLD-MCNC: 8.6 G/DL (ref 12–16)
IMM GRANULOCYTES # BLD AUTO: 0.04 K/UL (ref 0–0.04)
IMM GRANULOCYTES NFR BLD AUTO: 0.4 % (ref 0–0.5)
LYMPHOCYTES # BLD AUTO: 2.4 K/UL (ref 1–4.8)
LYMPHOCYTES NFR BLD: 25 % (ref 18–48)
MAGNESIUM SERPL-MCNC: 1.8 MG/DL (ref 1.6–2.6)
MCH RBC QN AUTO: 29.8 PG (ref 27–31)
MCHC RBC AUTO-ENTMCNC: 33 G/DL (ref 32–36)
MCV RBC AUTO: 90 FL (ref 82–98)
MONOCYTES # BLD AUTO: 1.2 K/UL (ref 0.3–1)
MONOCYTES NFR BLD: 12.6 % (ref 4–15)
NEUTROPHILS # BLD AUTO: 5.9 K/UL (ref 1.8–7.7)
NEUTROPHILS NFR BLD: 61.3 % (ref 38–73)
NRBC BLD-RTO: 0 /100 WBC
NUM UNITS TRANS PACKED RBC: NORMAL
NUM UNITS TRANS PACKED RBC: NORMAL
PHOSPHATE SERPL-MCNC: 3.1 MG/DL (ref 2.7–4.5)
PLATELET # BLD AUTO: 243 K/UL (ref 150–450)
PMV BLD AUTO: 9.8 FL (ref 9.2–12.9)
POTASSIUM SERPL-SCNC: 3.8 MMOL/L (ref 3.5–5.1)
PROT SERPL-MCNC: 6.7 G/DL (ref 6–8.4)
RBC # BLD AUTO: 2.89 M/UL (ref 4–5.4)
SODIUM SERPL-SCNC: 134 MMOL/L (ref 136–145)
WBC # BLD AUTO: 9.61 K/UL (ref 3.9–12.7)

## 2024-07-13 PROCEDURE — 63600175 PHARM REV CODE 636 W HCPCS: Performed by: INTERNAL MEDICINE

## 2024-07-13 PROCEDURE — 27000221 HC OXYGEN, UP TO 24 HOURS

## 2024-07-13 PROCEDURE — 63600175 PHARM REV CODE 636 W HCPCS

## 2024-07-13 PROCEDURE — 80053 COMPREHEN METABOLIC PANEL: CPT | Performed by: NURSE PRACTITIONER

## 2024-07-13 PROCEDURE — 25000003 PHARM REV CODE 250: Performed by: NURSE PRACTITIONER

## 2024-07-13 PROCEDURE — 25000003 PHARM REV CODE 250: Performed by: INTERNAL MEDICINE

## 2024-07-13 PROCEDURE — 25000003 PHARM REV CODE 250

## 2024-07-13 PROCEDURE — 94761 N-INVAS EAR/PLS OXIMETRY MLT: CPT

## 2024-07-13 PROCEDURE — 84100 ASSAY OF PHOSPHORUS: CPT | Performed by: NURSE PRACTITIONER

## 2024-07-13 PROCEDURE — 83735 ASSAY OF MAGNESIUM: CPT | Performed by: NURSE PRACTITIONER

## 2024-07-13 PROCEDURE — 99900035 HC TECH TIME PER 15 MIN (STAT)

## 2024-07-13 PROCEDURE — 85025 COMPLETE CBC W/AUTO DIFF WBC: CPT

## 2024-07-13 PROCEDURE — 11000001 HC ACUTE MED/SURG PRIVATE ROOM

## 2024-07-13 RX ORDER — TORSEMIDE 20 MG/1
40 TABLET ORAL DAILY
Status: DISCONTINUED | OUTPATIENT
Start: 2024-07-14 | End: 2024-07-17 | Stop reason: HOSPADM

## 2024-07-13 RX ADMIN — ACETAMINOPHEN 650 MG: 325 TABLET ORAL at 02:07

## 2024-07-13 RX ADMIN — DEXTROSE MONOHYDRATE 1 G: 2.5 INJECTION INTRAVENOUS at 12:07

## 2024-07-13 RX ADMIN — LEVETIRACETAM 2000 MG: 750 TABLET, FILM COATED ORAL at 08:07

## 2024-07-13 RX ADMIN — ACETAMINOPHEN 650 MG: 325 TABLET ORAL at 07:07

## 2024-07-13 RX ADMIN — HEPARIN SODIUM 5000 UNITS: 5000 INJECTION INTRAVENOUS; SUBCUTANEOUS at 02:07

## 2024-07-13 RX ADMIN — CLOBAZAM 40 MG: 10 TABLET ORAL at 08:07

## 2024-07-13 RX ADMIN — OXYCODONE HYDROCHLORIDE 5 MG: 5 TABLET ORAL at 08:07

## 2024-07-13 RX ADMIN — HEPARIN SODIUM 5000 UNITS: 5000 INJECTION INTRAVENOUS; SUBCUTANEOUS at 06:07

## 2024-07-13 RX ADMIN — OXYCODONE HYDROCHLORIDE 5 MG: 5 TABLET ORAL at 04:07

## 2024-07-13 RX ADMIN — DEXTROSE MONOHYDRATE 1 G: 2.5 INJECTION INTRAVENOUS at 06:07

## 2024-07-13 RX ADMIN — OXCARBAZEPINE 300 MG: 300 TABLET, FILM COATED ORAL at 08:07

## 2024-07-13 RX ADMIN — SENNOSIDES AND DOCUSATE SODIUM 1 TABLET: 50; 8.6 TABLET ORAL at 08:07

## 2024-07-13 RX ADMIN — LOSARTAN POTASSIUM 50 MG: 50 TABLET, FILM COATED ORAL at 08:07

## 2024-07-13 RX ADMIN — OXYCODONE HYDROCHLORIDE 5 MG: 5 TABLET ORAL at 02:07

## 2024-07-13 RX ADMIN — HEPARIN SODIUM 5000 UNITS: 5000 INJECTION INTRAVENOUS; SUBCUTANEOUS at 08:07

## 2024-07-13 RX ADMIN — DEXTROSE MONOHYDRATE 1 G: 2.5 INJECTION INTRAVENOUS at 03:07

## 2024-07-13 RX ADMIN — NIFEDIPINE 30 MG: 30 TABLET, FILM COATED, EXTENDED RELEASE ORAL at 08:07

## 2024-07-13 RX ADMIN — LEVETIRACETAM 2000 MG: 750 TABLET, FILM COATED ORAL at 09:07

## 2024-07-13 NOTE — NURSING TRANSFER
Nursing Transfer Note      7/13/2024   9:57 AM    Nurse giving handoff: JAYLENE Layton   Nurse receiving handoff: JAYLENE Moser    Reason patient is being transferred: stepdown    Transfer To: 948    Transfer via bed    Transfer with pt to O2, cardiac monitoring    Transported by RN    Transfer Vital Signs:  Blood Pressure:117/63  Heart Rate:76  O2:100  Temperature:98  Respirations:12    Telemetry: Box Number [id number]  Order for Tele Monitor? Yes    Additional Lines: Oxygen    4eyes on Skin: yes    Medicines sent: n/a    Any special needs or follow-up needed: n/a    Patient belongings transferred with patient: Yes    Chart send with patient: Yes    Notified: spouse at bedside    Patient reassessed at: 7/13 at 0945 (date, time)  1  Upon arrival to floor: cardiac monitor applied, patient oriented to room, call bell in reach, and bed in lowest position
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
## 2024-07-13 NOTE — ASSESSMENT & PLAN NOTE
70 yo female who presented for elective AVM embolization on 7/9 and anterior temporal lobectomy on 7/10.    CTH: Resection of REE. No new acute hemorrhages  MRI: resection of both REE and AVM. No acute hydrocephalus or hemorrhage.    Recommendations:  --SBP<140  --PT/OT  --Restarted on home AEDs  --PT/OT/OOB  --LUIS ENRIQUE/SCD/LVX  --Please call NSGY with any acute decline in neurological exam    Discussed with Dr. Patel  Dispo: TTF under NSY

## 2024-07-13 NOTE — NURSING
Patient Transferred to NPU Room 948       Upon arrival to the floor, patient greeted and oriented to room. Complete head to toe assessment completed per protocol. VSS, see flowsheet for details. Neuro assessment completed. Primary team notified of patient's transfer to floor. All current and transfer orders reviewed/reconciled per protocol. All emergency equipment set up in patient's room. Fall/seizure precautions initiated per providers orders. 4 Eyes skin assessment performed, see below for details. Reviewed assessment and rounding frequency with patient and family. Questions were encouraged and addressed. Repositioned patient for comfort with bed locked in lowest position, side rails up x 3, bed alarm set, and call light within reach. Instructed patient to call staff for mobility/assistance, verbalized understanding. No acute signs of distress noted at this time.     Nurses Note -- 4 Eyes      7/13/2024   1100AM      Skin assessed during: Admit      [] No Altered Skin Integrity Present    []Prevention Measures Documented      [x] Yes- Altered Skin Integrity Present or Discovered   [x] LDA Added if Not in Epic (Describe Wound)   [] New Altered Skin Integrity was Present on Admit and Documented in LDA   [] Wound Image Taken    Wound Care Consulted? No    Attending Nurse:  Ehsan Ortiz RN/Staff Member:  Kinjal

## 2024-07-13 NOTE — PLAN OF CARE
[facility] Care Plan  POC reviewed with Haydee Linda and significant other throughout the shift. Patient verbalized an understanding. Questions answered, concerns addressed,and support provided. No acute events noted. No Continued c/o chest pain. EKG and chest xray ordered during day shift. Candelario NP in to speak with patient. BNP ordered. Pt progressing toward goals. Cool pack placed to face for periorbital swelling/discomfort. Slept 6-8 hours overnight. Oxy and tylenol alternated for head discomfort overnight. Patient happy with the pain control. Will continue to monitor. See below, flowsheets and eMAR for full details.       RESP:   Remains on 2L oxygen via NC  Saturations adequate with no significant desats noted    NEURO:   Remains at neuro baseline (AAOx4), MAEW and afebrile  Ataxia noted with ambulation to bathroom, nurse assisted.    CV:   Remains hemodynamically stable  Rhythm: normal sinus rhythm  SBP < 160  MAP > 65  BNP 57 pg/ml    GI/:   Diet/Nutrition Received: regular  Last Bowel Movement: 07/12/2024  Voiding characteristics: pure wick

## 2024-07-13 NOTE — SUBJECTIVE & OBJECTIVE
Interval History: POD 3. NAEON. AFVSS. Exam stable. Pain controlled. Drain removed yesterday.     Medications:  Continuous Infusions:      Scheduled Meds:   ceFAZolin (Ancef) IV (PEDS and ADULTS)  1 g Intravenous Q8H    cloBAZam  40 mg Oral QHS    heparin (porcine)  5,000 Units Subcutaneous Q8H    levETIRAcetam  2,000 mg Oral BID    losartan  50 mg Oral Daily    NIFEdipine  30 mg Oral BID    OXcarbazepine  300 mg Oral BID    polyethylene glycol  17 g Oral Daily    senna-docusate 8.6-50 mg  1 tablet Oral BID     PRN Meds:  Current Facility-Administered Medications:     acetaminophen, 650 mg, Oral, Q6H PRN    bisacodyL, 10 mg, Rectal, Daily PRN    dextrose 10%, 12.5 g, Intravenous, PRN    dextrose 10%, 25 g, Intravenous, PRN    glucagon (human recombinant), 1 mg, Intramuscular, PRN    glucose, 16 g, Oral, PRN    glucose, 24 g, Oral, PRN    magnesium oxide, 800 mg, Oral, PRN    magnesium oxide, 800 mg, Oral, PRN    ondansetron, 4 mg, Intravenous, Q8H PRN    oxyCODONE, 5 mg, Oral, Q4H PRN    potassium bicarbonate, 35 mEq, Oral, PRN    potassium bicarbonate, 50 mEq, Oral, PRN    potassium bicarbonate, 60 mEq, Oral, PRN    potassium, sodium phosphates, 2 packet, Oral, PRN    potassium, sodium phosphates, 2 packet, Oral, PRN    potassium, sodium phosphates, 2 packet, Oral, PRN    promethazine (PHENERGAN) 12.5 mg in 0.9% NaCl 50 mL IVPB, 12.5 mg, Intravenous, Q6H PRN    sodium chloride 0.9%, 10 mL, Intravenous, PRN     Review of Systems  Objective:     Weight: 75.8 kg (167 lb 1.7 oz)  Body mass index is 25.41 kg/m².  Vital Signs (Most Recent):  Temp: 97.8 °F (36.6 °C) (07/13/24 0401)  Pulse: 79 (07/13/24 0736)  Resp: (!) 23 (07/13/24 0736)  BP: 114/64 (07/13/24 0500)  SpO2: 100 % (07/13/24 0736) Vital Signs (24h Range):  Temp:  [97.8 °F (36.6 °C)-98.9 °F (37.2 °C)] 97.8 °F (36.6 °C)  Pulse:  [71-99] 79  Resp:  [11-28] 23  SpO2:  [96 %-100 %] 100 %  BP: ()/(51-71) 114/64                              Closed/Suction  "Drain 07/10/24 1309 Tube - 1 Right Scalp Accordion 10 Fr. (Active)   Site Description Healing 07/11/24 0505   Dressing Type Gauze 07/11/24 0505   Dressing Status Clean;Dry;Intact 07/11/24 0505   Dressing Intervention Integrity maintained 07/11/24 0505   Drainage Bloody 07/11/24 0505   Output (mL) 65 mL 07/11/24 0447            Urethral Catheter 07/10/24 0845 Silicone;Non-latex;Straight-tip 16 Fr. (Active)   $ Coon Insertion Bedside Insertion Performed 07/10/24 1500   Site Assessment Clean;Intact 07/11/24 0505   Collection Container Urimeter 07/11/24 0505   Securement Method secured to top of thigh w/ adhesive device 07/11/24 0505   Catheter Care Performed no 07/11/24 0505   Reason for Continuing Urinary Catheterization Post operative 07/11/24 0505   CAUTI Prevention Bundle Securement Device in place with 1" slack;Intact seal between catheter & drainage tubing;Drainage bag/urimeter off the floor;Sheeting clip in use;No dependent loops or kinks;Drainage bag/urimeter below bladder;Drainage bag/urimeter not overfilled (<2/3 full) 07/11/24 0305   Output (mL) 145 mL 07/11/24 0605          Physical Exam         Neurosurgery Physical Exam    General: well developed, well nourished, no distress.   HEENT: normocephalic, atraumatic, Surgical dressing in place  CV: regular rate   Pulmonary: normal respirations, no signs of respiratory distress  Abdomen: soft, non-distended, not tender to palpation  Skin: Skin is warm, dry and intact  Heme: no bruising    Neuro:   Mental Status: AO x4, no aphasia, no dysarthria   CN: PERRL, EOMI, VF intact to confrontation, sensation intact bilaterally, eyebrow raise and grimace symmetric, tongue midline  Motor: moves all extremities spontaneously, full strength throughout, no pronator drift   Sensory: intact to light touch throughout     Incision: Clean, dry, intact. Skin edges well approximated. No surrounding erythema or edema. No drainage or TTP.      Significant Labs:  Recent Labs   Lab " "07/12/24  0117 07/13/24  0431   * 104   * 134*   K 3.9 3.8    101   CO2 23 27   BUN 6* 7*   CREATININE 0.7 0.8   CALCIUM 9.8 9.5   MG 2.0 1.8     Recent Labs   Lab 07/12/24  0117 07/13/24  0431   WBC 10.03 9.61   HGB 9.0* 8.6*   HCT 27.2* 26.1*    243     No results for input(s): "LABPT", "INR", "APTT" in the last 48 hours.    Microbiology Results (last 7 days)       ** No results found for the last 168 hours. **          All pertinent labs from the last 24 hours have been reviewed.    Significant Diagnostics:  X-Ray Chest AP Portable    Result Date: 7/12/2024  No acute abnormality. Electronically signed by: Nura Souza Date:    07/12/2024 Time:    23:18   "

## 2024-07-13 NOTE — PLAN OF CARE
Problem: Adult Inpatient Plan of Care  Goal: Plan of Care Review  Outcome: Progressing  Goal: Optimal Comfort and Wellbeing  Outcome: Progressing     Problem: Wound  Goal: Optimal Coping  Outcome: Progressing  Goal: Optimal Functional Ability  Outcome: Progressing  Goal: Absence of Infection Signs and Symptoms  Outcome: Progressing  Goal: Optimal Pain Control and Function  Outcome: Progressing     Problem: Skin Injury Risk Increased  Goal: Skin Health and Integrity  Outcome: Progressing     Problem: Infection  Goal: Absence of Infection Signs and Symptoms  Outcome: Progressing     Problem: Fall Injury Risk  Goal: Absence of Fall and Fall-Related Injury  Outcome: Progressing

## 2024-07-14 LAB
ALBUMIN SERPL BCP-MCNC: 3.1 G/DL (ref 3.5–5.2)
ALP SERPL-CCNC: 78 U/L (ref 55–135)
ALT SERPL W/O P-5'-P-CCNC: 11 U/L (ref 10–44)
ANION GAP SERPL CALC-SCNC: 7 MMOL/L (ref 8–16)
AST SERPL-CCNC: 14 U/L (ref 10–40)
BASOPHILS # BLD AUTO: 0.02 K/UL (ref 0–0.2)
BASOPHILS NFR BLD: 0.3 % (ref 0–1.9)
BILIRUB SERPL-MCNC: 0.3 MG/DL (ref 0.1–1)
BUN SERPL-MCNC: 9 MG/DL (ref 8–23)
CALCIUM SERPL-MCNC: 9.9 MG/DL (ref 8.7–10.5)
CHLORIDE SERPL-SCNC: 103 MMOL/L (ref 95–110)
CO2 SERPL-SCNC: 27 MMOL/L (ref 23–29)
CREAT SERPL-MCNC: 0.7 MG/DL (ref 0.5–1.4)
DIFFERENTIAL METHOD BLD: ABNORMAL
EOSINOPHIL # BLD AUTO: 0 K/UL (ref 0–0.5)
EOSINOPHIL NFR BLD: 0.4 % (ref 0–8)
ERYTHROCYTE [DISTWIDTH] IN BLOOD BY AUTOMATED COUNT: 12.5 % (ref 11.5–14.5)
EST. GFR  (NO RACE VARIABLE): >60 ML/MIN/1.73 M^2
GLUCOSE SERPL-MCNC: 104 MG/DL (ref 70–110)
HCT VFR BLD AUTO: 26.9 % (ref 37–48.5)
HGB BLD-MCNC: 8.4 G/DL (ref 12–16)
IMM GRANULOCYTES # BLD AUTO: 0.04 K/UL (ref 0–0.04)
IMM GRANULOCYTES NFR BLD AUTO: 0.6 % (ref 0–0.5)
LYMPHOCYTES # BLD AUTO: 1.5 K/UL (ref 1–4.8)
LYMPHOCYTES NFR BLD: 22 % (ref 18–48)
MAGNESIUM SERPL-MCNC: 1.7 MG/DL (ref 1.6–2.6)
MCH RBC QN AUTO: 29.6 PG (ref 27–31)
MCHC RBC AUTO-ENTMCNC: 31.2 G/DL (ref 32–36)
MCV RBC AUTO: 95 FL (ref 82–98)
MONOCYTES # BLD AUTO: 0.7 K/UL (ref 0.3–1)
MONOCYTES NFR BLD: 9.4 % (ref 4–15)
NEUTROPHILS # BLD AUTO: 4.7 K/UL (ref 1.8–7.7)
NEUTROPHILS NFR BLD: 67.3 % (ref 38–73)
NRBC BLD-RTO: 0 /100 WBC
PHOSPHATE SERPL-MCNC: 3.1 MG/DL (ref 2.7–4.5)
PLATELET # BLD AUTO: 266 K/UL (ref 150–450)
PMV BLD AUTO: 9.9 FL (ref 9.2–12.9)
POTASSIUM SERPL-SCNC: 4.2 MMOL/L (ref 3.5–5.1)
PROT SERPL-MCNC: 6.5 G/DL (ref 6–8.4)
RBC # BLD AUTO: 2.84 M/UL (ref 4–5.4)
SODIUM SERPL-SCNC: 137 MMOL/L (ref 136–145)
WBC # BLD AUTO: 6.91 K/UL (ref 3.9–12.7)

## 2024-07-14 PROCEDURE — 25000003 PHARM REV CODE 250: Performed by: STUDENT IN AN ORGANIZED HEALTH CARE EDUCATION/TRAINING PROGRAM

## 2024-07-14 PROCEDURE — 93010 ELECTROCARDIOGRAM REPORT: CPT | Mod: ,,, | Performed by: INTERNAL MEDICINE

## 2024-07-14 PROCEDURE — A4216 STERILE WATER/SALINE, 10 ML: HCPCS | Performed by: STUDENT IN AN ORGANIZED HEALTH CARE EDUCATION/TRAINING PROGRAM

## 2024-07-14 PROCEDURE — 25000003 PHARM REV CODE 250: Performed by: INTERNAL MEDICINE

## 2024-07-14 PROCEDURE — 80053 COMPREHEN METABOLIC PANEL: CPT | Performed by: NURSE PRACTITIONER

## 2024-07-14 PROCEDURE — 99222 1ST HOSP IP/OBS MODERATE 55: CPT | Mod: GC,,, | Performed by: INTERNAL MEDICINE

## 2024-07-14 PROCEDURE — 25000003 PHARM REV CODE 250

## 2024-07-14 PROCEDURE — 25000003 PHARM REV CODE 250: Performed by: NURSE PRACTITIONER

## 2024-07-14 PROCEDURE — 93005 ELECTROCARDIOGRAM TRACING: CPT

## 2024-07-14 PROCEDURE — 27000221 HC OXYGEN, UP TO 24 HOURS

## 2024-07-14 PROCEDURE — 63600175 PHARM REV CODE 636 W HCPCS

## 2024-07-14 PROCEDURE — 11000001 HC ACUTE MED/SURG PRIVATE ROOM

## 2024-07-14 PROCEDURE — 99900035 HC TECH TIME PER 15 MIN (STAT)

## 2024-07-14 PROCEDURE — 85025 COMPLETE CBC W/AUTO DIFF WBC: CPT

## 2024-07-14 PROCEDURE — 84100 ASSAY OF PHOSPHORUS: CPT | Performed by: NURSE PRACTITIONER

## 2024-07-14 PROCEDURE — 94761 N-INVAS EAR/PLS OXIMETRY MLT: CPT

## 2024-07-14 PROCEDURE — 63600175 PHARM REV CODE 636 W HCPCS: Performed by: INTERNAL MEDICINE

## 2024-07-14 PROCEDURE — 36415 COLL VENOUS BLD VENIPUNCTURE: CPT | Performed by: NURSE PRACTITIONER

## 2024-07-14 PROCEDURE — 83735 ASSAY OF MAGNESIUM: CPT | Performed by: NURSE PRACTITIONER

## 2024-07-14 RX ADMIN — NIFEDIPINE 30 MG: 30 TABLET, FILM COATED, EXTENDED RELEASE ORAL at 09:07

## 2024-07-14 RX ADMIN — TORSEMIDE 40 MG: 20 TABLET ORAL at 09:07

## 2024-07-14 RX ADMIN — Medication 10 ML: at 05:07

## 2024-07-14 RX ADMIN — SENNOSIDES AND DOCUSATE SODIUM 1 TABLET: 50; 8.6 TABLET ORAL at 09:07

## 2024-07-14 RX ADMIN — LEVETIRACETAM 2000 MG: 750 TABLET, FILM COATED ORAL at 09:07

## 2024-07-14 RX ADMIN — HEPARIN SODIUM 5000 UNITS: 5000 INJECTION INTRAVENOUS; SUBCUTANEOUS at 09:07

## 2024-07-14 RX ADMIN — OXYCODONE HYDROCHLORIDE 5 MG: 5 TABLET ORAL at 09:07

## 2024-07-14 RX ADMIN — CLOBAZAM 40 MG: 10 TABLET ORAL at 09:07

## 2024-07-14 RX ADMIN — HEPARIN SODIUM 5000 UNITS: 5000 INJECTION INTRAVENOUS; SUBCUTANEOUS at 05:07

## 2024-07-14 RX ADMIN — DEXTROSE MONOHYDRATE 1 G: 2.5 INJECTION INTRAVENOUS at 11:07

## 2024-07-14 RX ADMIN — ACETAMINOPHEN 650 MG: 325 TABLET ORAL at 12:07

## 2024-07-14 RX ADMIN — OXCARBAZEPINE 300 MG: 300 TABLET, FILM COATED ORAL at 09:07

## 2024-07-14 RX ADMIN — DEXTROSE MONOHYDRATE 1 G: 2.5 INJECTION INTRAVENOUS at 07:07

## 2024-07-14 RX ADMIN — HEPARIN SODIUM 5000 UNITS: 5000 INJECTION INTRAVENOUS; SUBCUTANEOUS at 03:07

## 2024-07-14 RX ADMIN — OXYCODONE HYDROCHLORIDE 5 MG: 5 TABLET ORAL at 05:07

## 2024-07-14 RX ADMIN — DEXTROSE MONOHYDRATE 1 G: 2.5 INJECTION INTRAVENOUS at 05:07

## 2024-07-14 RX ADMIN — OXYCODONE HYDROCHLORIDE 5 MG: 5 TABLET ORAL at 01:07

## 2024-07-14 RX ADMIN — OXYCODONE HYDROCHLORIDE 5 MG: 5 TABLET ORAL at 12:07

## 2024-07-14 NOTE — NURSING
..Nurses Note -- 4 Eyes      7/14/2024   2:56 AM      Skin assessed during: Q Shift Change      [] No Altered Skin Integrity Present    []Prevention Measures Documented      [x] Yes- Altered Skin Integrity Present or Discovered   [] LDA Added if Not in Epic (Describe Wound)   [] New Altered Skin Integrity was Present on Admit and Documented in LDA   [] Wound Image Taken    Wound Care Consulted? No    Attending Nurse:  April     Second RN/Staff Member:  Sylvia       Surgical Incision to right side of head. Stables dry and intact

## 2024-07-14 NOTE — ASSESSMENT & PLAN NOTE
Most likely secondary to vasospastic angina in the setting of patient reported hx. Not related to ACS.     - Recommend PRN SL Nitroglycerin 0.4mg for chest pain as blood pressure tolerates.   - As an outpatient, can consider up-titrating her home nifedipine and starting beta blocker. Blood pressures relatively on the lower side right now so will hold making those changes now.

## 2024-07-14 NOTE — SUBJECTIVE & OBJECTIVE
Interval History: NAEON. AFVSS. Exam stable. Chest pain yesterday; workup negative. Drain removed.    Medications:  Continuous Infusions:      Scheduled Meds:   ceFAZolin (Ancef) IV (PEDS and ADULTS)  1 g Intravenous Q8H    cloBAZam  40 mg Oral QHS    heparin (porcine)  5,000 Units Subcutaneous Q8H    levETIRAcetam  2,000 mg Oral BID    losartan  50 mg Oral Daily    NIFEdipine  30 mg Oral BID    OXcarbazepine  300 mg Oral BID    polyethylene glycol  17 g Oral Daily    senna-docusate 8.6-50 mg  1 tablet Oral BID    torsemide  40 mg Oral Daily     PRN Meds:  Current Facility-Administered Medications:     acetaminophen, 650 mg, Oral, Q6H PRN    bisacodyL, 10 mg, Rectal, Daily PRN    dextrose 10%, 12.5 g, Intravenous, PRN    dextrose 10%, 25 g, Intravenous, PRN    glucagon (human recombinant), 1 mg, Intramuscular, PRN    glucose, 16 g, Oral, PRN    glucose, 24 g, Oral, PRN    magnesium oxide, 800 mg, Oral, PRN    magnesium oxide, 800 mg, Oral, PRN    ondansetron, 4 mg, Intravenous, Q8H PRN    oxyCODONE, 5 mg, Oral, Q4H PRN    potassium bicarbonate, 35 mEq, Oral, PRN    potassium bicarbonate, 50 mEq, Oral, PRN    potassium bicarbonate, 60 mEq, Oral, PRN    potassium, sodium phosphates, 2 packet, Oral, PRN    potassium, sodium phosphates, 2 packet, Oral, PRN    potassium, sodium phosphates, 2 packet, Oral, PRN    promethazine (PHENERGAN) 12.5 mg in 0.9% NaCl 50 mL IVPB, 12.5 mg, Intravenous, Q6H PRN    sodium chloride 0.9%, 10 mL, Intravenous, PRN     Review of Systems  Objective:     Weight: 75.8 kg (167 lb 1.7 oz)  Body mass index is 25.41 kg/m².  Vital Signs (Most Recent):  Temp: 98.6 °F (37 °C) (07/14/24 0811)  Pulse: 84 (07/14/24 0811)  Resp: 16 (07/14/24 0811)  BP: 108/66 (07/14/24 0936)  SpO2: 96 % (07/14/24 0811) Vital Signs (24h Range):  Temp:  [97.8 °F (36.6 °C)-98.8 °F (37.1 °C)] 98.6 °F (37 °C)  Pulse:  [76-84] 84  Resp:  [16-18] 16  SpO2:  [96 %-100 %] 96 %  BP: (108-124)/(56-66) 108/66                  "             Closed/Suction Drain 07/10/24 1309 Tube - 1 Right Scalp Accordion 10 Fr. (Active)   Site Description Healing 07/11/24 0505   Dressing Type Gauze 07/11/24 0505   Dressing Status Clean;Dry;Intact 07/11/24 0505   Dressing Intervention Integrity maintained 07/11/24 0505   Drainage Bloody 07/11/24 0505   Output (mL) 65 mL 07/11/24 0447            Urethral Catheter 07/10/24 0845 Silicone;Non-latex;Straight-tip 16 Fr. (Active)   $ Coon Insertion Bedside Insertion Performed 07/10/24 1500   Site Assessment Clean;Intact 07/11/24 0505   Collection Container Urimeter 07/11/24 0505   Securement Method secured to top of thigh w/ adhesive device 07/11/24 0505   Catheter Care Performed no 07/11/24 0505   Reason for Continuing Urinary Catheterization Post operative 07/11/24 0505   CAUTI Prevention Bundle Securement Device in place with 1" slack;Intact seal between catheter & drainage tubing;Drainage bag/urimeter off the floor;Sheeting clip in use;No dependent loops or kinks;Drainage bag/urimeter below bladder;Drainage bag/urimeter not overfilled (<2/3 full) 07/11/24 0305   Output (mL) 145 mL 07/11/24 0605          Physical Exam         Neurosurgery Physical Exam    General: well developed, well nourished, no distress.   HEENT: normocephalic, atraumatic, Surgical dressing in place  CV: regular rate   Pulmonary: normal respirations, no signs of respiratory distress  Abdomen: soft, non-distended, not tender to palpation  Skin: Skin is warm, dry and intact  Heme: no bruising    Neuro:   Mental Status: AO x4, no aphasia, no dysarthria   CN: PERRL, EOMI, VF intact to confrontation, sensation intact bilaterally, eyebrow raise and grimace symmetric, tongue midline  Motor: moves all extremities spontaneously, full strength throughout, no pronator drift   Sensory: intact to light touch throughout     Incision: Clean, dry, intact. Skin edges well approximated. No surrounding erythema or edema. No drainage or TTP.      Significant " "Labs:  Recent Labs   Lab 07/13/24  0431 07/14/24  0426    104   * 137   K 3.8 4.2    103   CO2 27 27   BUN 7* 9   CREATININE 0.8 0.7   CALCIUM 9.5 9.9   MG 1.8 1.7     Recent Labs   Lab 07/13/24  0431 07/14/24  0426   WBC 9.61 6.91   HGB 8.6* 8.4*   HCT 26.1* 26.9*    266     No results for input(s): "LABPT", "INR", "APTT" in the last 48 hours.    Microbiology Results (last 7 days)       ** No results found for the last 168 hours. **          All pertinent labs from the last 24 hours have been reviewed.    Significant Diagnostics:  No results found in the last 24 hours.  "

## 2024-07-14 NOTE — ASSESSMENT & PLAN NOTE
68 yo female who presented for elective AVM embolization on 7/9 and anterior temporal lobectomy on 7/10.    CTH: Resection of REE. No new acute hemorrhages  MRI: resection of both REE and AVM. No acute hydrocephalus or hemorrhage.    Recommendations:  --SBP<140  --PT/OT  --Restarted on home AEDs  --PT/OT/OOB  --LUIS ENRIQUE/SCD/LVX  --Consult cards for recs for chest pain.  --Please call NSGY with any acute decline in neurological exam    Discussed with Dr. Patel    Dispo: ongoing; anticipate home.

## 2024-07-14 NOTE — PLAN OF CARE
Problem: Adult Inpatient Plan of Care  Goal: Plan of Care Review  Outcome: Progressing  Goal: Patient-Specific Goal (Individualized)  Outcome: Progressing  Goal: Absence of Hospital-Acquired Illness or Injury  Outcome: Progressing  Goal: Optimal Comfort and Wellbeing  Outcome: Progressing  Goal: Readiness for Transition of Care  Outcome: Progressing     Problem: Wound  Goal: Optimal Coping  Outcome: Progressing  Goal: Optimal Functional Ability  Outcome: Progressing  Goal: Absence of Infection Signs and Symptoms  Outcome: Progressing  Goal: Improved Oral Intake  Outcome: Progressing  Goal: Optimal Pain Control and Function  Outcome: Progressing  Goal: Skin Health and Integrity  Outcome: Progressing  Goal: Optimal Wound Healing  Outcome: Progressing     Problem: Skin Injury Risk Increased  Goal: Skin Health and Integrity  Outcome: Progressing     Problem: Infection  Goal: Absence of Infection Signs and Symptoms  Outcome: Progressing     Problem: Fall Injury Risk  Goal: Absence of Fall and Fall-Related Injury  Outcome: Progressing
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
## 2024-07-14 NOTE — PLAN OF CARE
Problem: Adult Inpatient Plan of Care  Goal: Plan of Care Review  Outcome: Progressing  Goal: Absence of Hospital-Acquired Illness or Injury  Outcome: Progressing  Goal: Optimal Comfort and Wellbeing  Outcome: Progressing     Problem: Wound  Goal: Optimal Functional Ability  Outcome: Progressing  Goal: Optimal Pain Control and Function  Outcome: Progressing  Goal: Skin Health and Integrity  Outcome: Progressing     Problem: Infection  Goal: Absence of Infection Signs and Symptoms  Outcome: Progressing     Problem: Fall Injury Risk  Goal: Absence of Fall and Fall-Related Injury  Outcome: Progressing   Pt complained chest pain during cardiology rounding EKG taken ,pending ECHO.complained constant headache during the evening shift.oxy 5mg two times given.tylenol one time given.

## 2024-07-14 NOTE — SUBJECTIVE & OBJECTIVE
Past Medical History:   Diagnosis Date    CHF (congestive heart failure)     Coronary artery disease     Hypertension     Seizures     ST elevation (STEMI) myocardial infarction of unspecified site     2022    Stroke     intracranial hemorrhage       Past Surgical History:   Procedure Laterality Date    BREAST SURGERY      Cyst , enhancement - 1984    CRANIOTOMY USING FRAMELESS STEREOTAXY Right 7/10/2024    Procedure: ANTERIOR TEMPORAL LOBECTOMY AND TEMPORAL AVM CRANIOTOMY, USING FRAMELESS STEREOTAXY;  Surgeon: Lynda Patel MD;  Location: [facility] OR [address] [place] [address];  Service: Neurosurgery;  Laterality: Right;  BRAIN LAB    cyst removal of neck      Benign- Lymphnode-    Dental, Jaw surgery- 2023      TONSILLECTOMY      Tonsillectomy and adenoidectomy at age 7 years       Review of patient's allergies indicates:   Allergen Reactions    Opioids - morphine analogues Hallucinations     Can tolerate Codeine, Dilaudid, oxycodone, hydrocodone       No current facility-administered medications on file prior to encounter.     Current Outpatient Medications on File Prior to Encounter   Medication Sig    acetaminophen (TYLENOL) 500 MG tablet Take 1,000 mg by mouth daily as needed for Pain.    bacitracin 500 unit/gram ointment Apply topically 2 (two) times daily. To inside of nasal passages    cloBAZam (ONFI) 20 mg Tab Take 2 tablets (40 mg total) by mouth every evening.    docusate sodium (COLACE) 100 MG capsule Take 100 mg by mouth 2 (two) times daily.    levETIRAcetam (KEPPRA) 1000 MG tablet Take 2 tablets (2,000 mg total) by mouth 2 (two) times daily.    losartan (COZAAR) 50 MG tablet Take 50 mg by mouth once daily.    magnesium gluconate 27.5 mg magne- sium (500 mg) Tab Take 500 mg by mouth 2 (two) times daily.    methyl salicylate/menthol (ICY HOT TOP) Apply topically. With lidocaine    multivitamin (THERAGRAN) per tablet Take 1 tablet by mouth once daily. With vegetables    NIFEdipine (PROCARDIA-XL) 30 MG (OSM) 24 hr tablet Take  30 mg by mouth 2 (two) times a day.    OXcarbazepine (TRILEPTAL) 300 MG Tab Take 1 tablet (300 mg total) by mouth 2 (two) times daily. (Patient taking differently: Take by mouth 2 (two) times daily.)    potassium chloride 10% (KAYCIEL) 20 mEq/15 mL oral solution Take 40 mEq by mouth 2 (two) times daily. She has not taking liquid potassium and is taking 40 mEq by mouth once a day in a pill form    torsemide (DEMADEX) 20 MG Tab Take 40 mg by mouth once daily.    UNABLE TO FIND medication name: SUPER BEET    midazolam (NAYZILAM) 5 mg/spray (0.1 mL) Spry 1 spray by Nasal route every 10 (ten) minutes as needed (cluster seizures). 1 spray for seizures more than 3min or more than 3 seizures in 24 hours. May give second dose after 10 min if seizures persist. (Patient not taking: Reported on 6/25/2024)     Family History       Problem Relation (Age of Onset)    Alcohol abuse Brother          Tobacco Use    Smoking status: Former     Types: Cigarettes    Smokeless tobacco: Never    Tobacco comments:     Quit 1999   Substance and Sexual Activity    Alcohol use: Not Currently    Drug use: Never    Sexual activity: Not on file     ROS12 point ROS negative except for HPI.   Objective:     Vital Signs (Most Recent):  Temp: 98.6 °F (37 °C) (07/14/24 1558)  Pulse: 84 (07/14/24 1558)  Resp: 18 (07/14/24 1733)  BP: 107/66 (07/14/24 1558)  SpO2: 97 % (07/14/24 1558) Vital Signs (24h Range):  Temp:  [97.8 °F (36.6 °C)-98.9 °F (37.2 °C)] 98.6 °F (37 °C)  Pulse:  [75-84] 84  Resp:  [16-18] 18  SpO2:  [96 %-99 %] 97 %  BP: (100-116)/(56-66) 107/66     Weight: 75.8 kg (167 lb 1.7 oz)  Body mass index is 25.41 kg/m².    SpO2: 97 %         Intake/Output Summary (Last 24 hours) at 7/14/2024 1596  Last data filed at 7/14/2024 1600  Gross per 24 hour   Intake 666 ml   Output 1000 ml   Net -334 ml       Lines/Drains/Airways       Drain  Duration             Female External Urinary Catheter w/ Suction 07/12/24 2301 1 day              Peripheral  Intravenous Line  Duration                  Peripheral IV - Single Lumen 07/12/24 0002 20 G Right Antecubital 2 days         Peripheral IV - Single Lumen 07/13/24 0501 22 G Anterior;Left Forearm 1 day                     Physical Exam  Constitutional:       General: She is not in acute distress.  HENT:      Head:      Comments: Surgical dressing in place  Neck:      Vascular: No hepatojugular reflux or JVD.   Cardiovascular:      Rate and Rhythm: Normal rate and regular rhythm.   Pulmonary:      Effort: Pulmonary effort is normal.      Breath sounds: Normal breath sounds.   Abdominal:      General: Bowel sounds are normal.      Palpations: Abdomen is soft.   Skin:     General: Skin is warm.      Capillary Refill: Capillary refill takes less than 2 seconds.   Neurological:      Mental Status: She is alert and oriented to person, place, and time.          Significant Labs: BMP:   Recent Labs   Lab 07/13/24  0431 07/14/24  0426    104   * 137   K 3.8 4.2    103   CO2 27 27   BUN 7* 9   CREATININE 0.8 0.7   CALCIUM 9.5 9.9   MG 1.8 1.7       Significant Imaging: EKG: NSR

## 2024-07-14 NOTE — HPI
Ms. Haydee Linda is a 70 y/o w/ PMH of refractory epilepsy, HFrEF (EF 43%), possible affluter, and reported vasospastic angina who presented to the hospital for elective AVM embolization on 7/9 and anterior temporal lobectomy on 7/10. Cardiology consulted to evaluate patients chest pain.     Patient states she has had chronic chest pain for about 2 years, same as she is currently experiencing. When it first appeared to in 2022 she underwent a LHC that was reportedly normal. Also had an ECHO at that time that reportedly showed an EF of 42%. She was diagnosed with vasospastic angina by her home Cardiologist and was started on nifipine which she states has helped control her pain. States as long as she is taking her nifidpine her pain is controlled. States SL nitroglycerin also helps.     Curently endorses 4/10 left sided chest pain radiating to left breast but denies any SOB, nausea, vomiting, diaphoresis. Repeated EKGs show no ischemic changes and troponins have been negative.

## 2024-07-14 NOTE — CONSULTS
David Nicole - Neurosurgery ([facility])  Cardiology  Consult Note    Patient Name: Haydee Linda  MRN: [id number]  Admission Date: 7/9/2024  Hospital Length of Stay: 5 days  Code Status: Full Code   Attending Provider: Lynda Patel MD   Consulting Provider: Moncho Brown MD  Primary Care Physician: Tamy, Primary Doctor  Principal Problem:Other chest pain    Patient information was obtained from patient, past medical records, and ER records.     Inpatient consult to Cardiology  Consult performed by: Moncho Brown MD  Consult ordered by: Eze Gonzales MD        Subjective:     Chief Complaint:  Chest pain    HPI:   Ms. Haydee Linda is a 68 y/o w/ PMH of refractory epilepsy, HFrEF (EF 43%), possible affluter, and reported vasospastic angina who presented to the hospital for elective AVM embolization on 7/9 and anterior temporal lobectomy on 7/10. Cardiology consulted to evaluate patients chest pain.     Patient states she has had chronic chest pain for about 2 years, same as she is currently experiencing. When it first appeared to in 2022 she underwent a LHC that was reportedly normal. Also had an ECHO at that time that reportedly showed an EF of 42%. She was diagnosed with vasospastic angina by her home Cardiologist and was started on nifipine which she states has helped control her pain. States as long as she is taking her nifidpine her pain is controlled. States SL nitroglycerin also helps.     Curently endorses 4/10 left sided chest pain radiating to left breast but denies any SOB, nausea, vomiting, diaphoresis. Repeated EKGs show no ischemic changes and troponins have been negative.     Past Medical History:   Diagnosis Date    CHF (congestive heart failure)     Coronary artery disease     Hypertension     Seizures     ST elevation (STEMI) myocardial infarction of unspecified site     2022    Stroke     intracranial hemorrhage       Past Surgical History:   Procedure Laterality Date    BREAST  SURGERY      Cyst , enhancement - 1984    CRANIOTOMY USING FRAMELESS STEREOTAXY Right 7/10/2024    Procedure: ANTERIOR TEMPORAL LOBECTOMY AND TEMPORAL AVM CRANIOTOMY, USING FRAMELESS STEREOTAXY;  Surgeon: Lynda Patel MD;  Location: [facility] OR [address] [place] [address];  Service: Neurosurgery;  Laterality: Right;  BRAIN LAB    cyst removal of neck      Benign- Lymphnode-    Dental, Jaw surgery- 2023      TONSILLECTOMY      Tonsillectomy and adenoidectomy at age 7 years       Review of patient's allergies indicates:   Allergen Reactions    Opioids - morphine analogues Hallucinations     Can tolerate Codeine, Dilaudid, oxycodone, hydrocodone       No current facility-administered medications on file prior to encounter.     Current Outpatient Medications on File Prior to Encounter   Medication Sig    acetaminophen (TYLENOL) 500 MG tablet Take 1,000 mg by mouth daily as needed for Pain.    bacitracin 500 unit/gram ointment Apply topically 2 (two) times daily. To inside of nasal passages    cloBAZam (ONFI) 20 mg Tab Take 2 tablets (40 mg total) by mouth every evening.    docusate sodium (COLACE) 100 MG capsule Take 100 mg by mouth 2 (two) times daily.    levETIRAcetam (KEPPRA) 1000 MG tablet Take 2 tablets (2,000 mg total) by mouth 2 (two) times daily.    losartan (COZAAR) 50 MG tablet Take 50 mg by mouth once daily.    magnesium gluconate 27.5 mg magne- sium (500 mg) Tab Take 500 mg by mouth 2 (two) times daily.    methyl salicylate/menthol (ICY HOT TOP) Apply topically. With lidocaine    multivitamin (THERAGRAN) per tablet Take 1 tablet by mouth once daily. With vegetables    NIFEdipine (PROCARDIA-XL) 30 MG (OSM) 24 hr tablet Take 30 mg by mouth 2 (two) times a day.    OXcarbazepine (TRILEPTAL) 300 MG Tab Take 1 tablet (300 mg total) by mouth 2 (two) times daily. (Patient taking differently: Take by mouth 2 (two) times daily.)    potassium chloride 10% (KAYCIEL) 20 mEq/15 mL oral solution Take 40 mEq by mouth 2 (two) times daily. She has  not taking liquid potassium and is taking 40 mEq by mouth once a day in a pill form    torsemide (DEMADEX) 20 MG Tab Take 40 mg by mouth once daily.    UNABLE TO FIND medication name: SUPER BEET    midazolam (NAYZILAM) 5 mg/spray (0.1 mL) Spry 1 spray by Nasal route every 10 (ten) minutes as needed (cluster seizures). 1 spray for seizures more than 3min or more than 3 seizures in 24 hours. May give second dose after 10 min if seizures persist. (Patient not taking: Reported on 6/25/2024)     Family History       Problem Relation (Age of Onset)    Alcohol abuse Brother          Tobacco Use    Smoking status: Former     Types: Cigarettes    Smokeless tobacco: Never    Tobacco comments:     Quit 1999   Substance and Sexual Activity    Alcohol use: Not Currently    Drug use: Never    Sexual activity: Not on file     ROS12 point ROS negative except for HPI.   Objective:     Vital Signs (Most Recent):  Temp: 98.6 °F (37 °C) (07/14/24 1558)  Pulse: 84 (07/14/24 1558)  Resp: 18 (07/14/24 1733)  BP: 107/66 (07/14/24 1558)  SpO2: 97 % (07/14/24 1558) Vital Signs (24h Range):  Temp:  [97.8 °F (36.6 °C)-98.9 °F (37.2 °C)] 98.6 °F (37 °C)  Pulse:  [75-84] 84  Resp:  [16-18] 18  SpO2:  [96 %-99 %] 97 %  BP: (100-116)/(56-66) 107/66     Weight: 75.8 kg (167 lb 1.7 oz)  Body mass index is 25.41 kg/m².    SpO2: 97 %         Intake/Output Summary (Last 24 hours) at 7/14/2024 1759  Last data filed at 7/14/2024 1600  Gross per 24 hour   Intake 666 ml   Output 1000 ml   Net -334 ml       Lines/Drains/Airways       Drain  Duration             Female External Urinary Catheter w/ Suction 07/12/24 2301 1 day              Peripheral Intravenous Line  Duration                  Peripheral IV - Single Lumen 07/12/24 0002 20 G Right Antecubital 2 days         Peripheral IV - Single Lumen 07/13/24 0501 22 G Anterior;Left Forearm 1 day                     Physical Exam  Constitutional:       General: She is not in acute distress.  HENT:      Head:       Comments: Surgical dressing in place  Neck:      Vascular: No hepatojugular reflux or JVD.   Cardiovascular:      Rate and Rhythm: Normal rate and regular rhythm.   Pulmonary:      Effort: Pulmonary effort is normal.      Breath sounds: Normal breath sounds.   Abdominal:      General: Bowel sounds are normal.      Palpations: Abdomen is soft.   Skin:     General: Skin is warm.      Capillary Refill: Capillary refill takes less than 2 seconds.   Neurological:      Mental Status: She is alert and oriented to person, place, and time.          Significant Labs: BMP:   Recent Labs   Lab 07/13/24  0431 07/14/24  0426    104   * 137   K 3.8 4.2    103   CO2 27 27   BUN 7* 9   CREATININE 0.8 0.7   CALCIUM 9.5 9.9   MG 1.8 1.7       Significant Imaging: EKG: NSR  Assessment and Plan:     * Other chest pain  Most likely secondary to vasospastic angina in the setting of patient reported hx. Not related to ACS.     - Recommend PRN SL Nitroglycerin 0.4mg for chest pain as blood pressure tolerates.   - As an outpatient, can consider up-titrating her home nifedipine and starting beta blocker. Blood pressures relatively on the lower side right now so will hold making those changes now.         VTE Risk Mitigation (From admission, onward)           Ordered     heparin (porcine) injection 5,000 Units  Every 8 hours         07/10/24 1453     IP VTE HIGH RISK PATIENT  Once         07/10/24 1453     Place sequential compression device  Until discontinued         07/10/24 1453                    Thank you for your consult. I will sign off. Please contact us if you have any additional questions.    Moncho Brown MD  Cardiology   [facility] - Neurosurgery ([facility])

## 2024-07-15 LAB
ALBUMIN SERPL BCP-MCNC: 3.4 G/DL (ref 3.5–5.2)
ALP SERPL-CCNC: 84 U/L (ref 55–135)
ALT SERPL W/O P-5'-P-CCNC: 19 U/L (ref 10–44)
ANION GAP SERPL CALC-SCNC: 12 MMOL/L (ref 8–16)
AST SERPL-CCNC: 19 U/L (ref 10–40)
AV INDEX (PROSTH): 0.58
AV MEAN GRADIENT: 5 MMHG
AV PEAK GRADIENT: 9 MMHG
AV VALVE AREA BY VELOCITY RATIO: 1.95 CM²
AV VALVE AREA: 2.42 CM²
AV VELOCITY RATIO: 0.47
BASOPHILS # BLD AUTO: 0.05 K/UL (ref 0–0.2)
BASOPHILS NFR BLD: 0.6 % (ref 0–1.9)
BILIRUB SERPL-MCNC: 0.3 MG/DL (ref 0.1–1)
BSA FOR ECHO PROCEDURE: 1.91 M2
BUN SERPL-MCNC: 15 MG/DL (ref 8–23)
CALCIUM SERPL-MCNC: 10.3 MG/DL (ref 8.7–10.5)
CHLORIDE SERPL-SCNC: 95 MMOL/L (ref 95–110)
CO2 SERPL-SCNC: 29 MMOL/L (ref 23–29)
CREAT SERPL-MCNC: 0.8 MG/DL (ref 0.5–1.4)
CV ECHO LV RWT: 0.41 CM
DIFFERENTIAL METHOD BLD: ABNORMAL
DOP CALC AO PEAK VEL: 1.53 M/S
DOP CALC AO VTI: 24.79 CM
DOP CALC LVOT AREA: 4.2 CM2
DOP CALC LVOT DIAMETER: 2.3 CM
DOP CALC LVOT PEAK VEL: 0.72 M/S
DOP CALC LVOT STROKE VOLUME: 59.88 CM3
DOP CALCLVOT PEAK VEL VTI: 14.42 CM
E/E' RATIO: 9.08 M/S
ECHO LV POSTERIOR WALL: 0.97 CM (ref 0.6–1.1)
EJECTION FRACTION: 50 %
EOSINOPHIL # BLD AUTO: 0.1 K/UL (ref 0–0.5)
EOSINOPHIL NFR BLD: 1 % (ref 0–8)
ERYTHROCYTE [DISTWIDTH] IN BLOOD BY AUTOMATED COUNT: 12.3 % (ref 11.5–14.5)
EST. GFR  (NO RACE VARIABLE): >60 ML/MIN/1.73 M^2
FRACTIONAL SHORTENING: 19 % (ref 28–44)
GLUCOSE SERPL-MCNC: 106 MG/DL (ref 70–110)
HCT VFR BLD AUTO: 28.4 % (ref 37–48.5)
HGB BLD-MCNC: 9.1 G/DL (ref 12–16)
IMM GRANULOCYTES # BLD AUTO: 0.03 K/UL (ref 0–0.04)
IMM GRANULOCYTES NFR BLD AUTO: 0.4 % (ref 0–0.5)
INTERVENTRICULAR SEPTUM: 0.96 CM (ref 0.6–1.1)
LA MAJOR: 5.19 CM
LA MINOR: 4.78 CM
LA WIDTH: 3.21 CM
LEFT ATRIUM SIZE: 2.59 CM
LEFT ATRIUM VOLUME INDEX: 18.6 ML/M2
LEFT ATRIUM VOLUME: 35.17 CM3
LEFT INTERNAL DIMENSION IN SYSTOLE: 3.8 CM (ref 2.1–4)
LEFT VENTRICLE DIASTOLIC VOLUME INDEX: 53.75 ML/M2
LEFT VENTRICLE DIASTOLIC VOLUME: 101.58 ML
LEFT VENTRICLE MASS INDEX: 82 G/M2
LEFT VENTRICLE SYSTOLIC VOLUME INDEX: 32.7 ML/M2
LEFT VENTRICLE SYSTOLIC VOLUME: 61.78 ML
LEFT VENTRICULAR INTERNAL DIMENSION IN DIASTOLE: 4.68 CM (ref 3.5–6)
LEFT VENTRICULAR MASS: 155.6 G
LV LATERAL E/E' RATIO: 9.83 M/S
LV SEPTAL E/E' RATIO: 8.43 M/S
LYMPHOCYTES # BLD AUTO: 1.4 K/UL (ref 1–4.8)
LYMPHOCYTES NFR BLD: 18.2 % (ref 18–48)
MAGNESIUM SERPL-MCNC: 1.6 MG/DL (ref 1.6–2.6)
MCH RBC QN AUTO: 29.5 PG (ref 27–31)
MCHC RBC AUTO-ENTMCNC: 32 G/DL (ref 32–36)
MCV RBC AUTO: 92 FL (ref 82–98)
MONOCYTES # BLD AUTO: 0.7 K/UL (ref 0.3–1)
MONOCYTES NFR BLD: 9.2 % (ref 4–15)
MV PEAK E VEL: 0.59 M/S
NEUTROPHILS # BLD AUTO: 5.4 K/UL (ref 1.8–7.7)
NEUTROPHILS NFR BLD: 70.6 % (ref 38–73)
NRBC BLD-RTO: 0 /100 WBC
OHS QRS DURATION: 86 MS
OHS QTC CALCULATION: 432 MS
PHOSPHATE SERPL-MCNC: 4.4 MG/DL (ref 2.7–4.5)
PLATELET # BLD AUTO: 319 K/UL (ref 150–450)
PMV BLD AUTO: 10.1 FL (ref 9.2–12.9)
POTASSIUM SERPL-SCNC: 3.7 MMOL/L (ref 3.5–5.1)
PROT SERPL-MCNC: 7.1 G/DL (ref 6–8.4)
RA MAJOR: 4.67 CM
RA PRESSURE ESTIMATED: 3 MMHG
RA WIDTH: 3.24 CM
RBC # BLD AUTO: 3.08 M/UL (ref 4–5.4)
RIGHT VENTRICLE DIASTOLIC BASEL DIMENSION: 2.6 CM
SINUS: 3.36 CM
SODIUM SERPL-SCNC: 136 MMOL/L (ref 136–145)
STJ: 3.1 CM
TDI LATERAL: 0.06 M/S
TDI SEPTAL: 0.07 M/S
TDI: 0.07 M/S
TRICUSPID ANNULAR PLANE SYSTOLIC EXCURSION: 2.4 CM
WBC # BLD AUTO: 7.71 K/UL (ref 3.9–12.7)
Z-SCORE OF LEFT VENTRICULAR DIMENSION IN END DIASTOLE: -1.23
Z-SCORE OF LEFT VENTRICULAR DIMENSION IN END SYSTOLE: 1.21

## 2024-07-15 PROCEDURE — 97530 THERAPEUTIC ACTIVITIES: CPT

## 2024-07-15 PROCEDURE — 63600175 PHARM REV CODE 636 W HCPCS

## 2024-07-15 PROCEDURE — 99024 POSTOP FOLLOW-UP VISIT: CPT | Mod: POP,,, | Performed by: PHYSICIAN ASSISTANT

## 2024-07-15 PROCEDURE — 85025 COMPLETE CBC W/AUTO DIFF WBC: CPT

## 2024-07-15 PROCEDURE — 36415 COLL VENOUS BLD VENIPUNCTURE: CPT | Performed by: NURSE PRACTITIONER

## 2024-07-15 PROCEDURE — 83735 ASSAY OF MAGNESIUM: CPT | Performed by: NURSE PRACTITIONER

## 2024-07-15 PROCEDURE — 63600175 PHARM REV CODE 636 W HCPCS: Performed by: INTERNAL MEDICINE

## 2024-07-15 PROCEDURE — 25000003 PHARM REV CODE 250: Performed by: STUDENT IN AN ORGANIZED HEALTH CARE EDUCATION/TRAINING PROGRAM

## 2024-07-15 PROCEDURE — 25500020 PHARM REV CODE 255: Performed by: NEUROLOGICAL SURGERY

## 2024-07-15 PROCEDURE — 25000242 PHARM REV CODE 250 ALT 637 W/ HCPCS: Performed by: PHYSICIAN ASSISTANT

## 2024-07-15 PROCEDURE — 99900035 HC TECH TIME PER 15 MIN (STAT)

## 2024-07-15 PROCEDURE — 97535 SELF CARE MNGMENT TRAINING: CPT

## 2024-07-15 PROCEDURE — 80053 COMPREHEN METABOLIC PANEL: CPT | Performed by: NURSE PRACTITIONER

## 2024-07-15 PROCEDURE — 94668 MNPJ CHEST WALL SBSQ: CPT

## 2024-07-15 PROCEDURE — 25000003 PHARM REV CODE 250: Performed by: INTERNAL MEDICINE

## 2024-07-15 PROCEDURE — 84100 ASSAY OF PHOSPHORUS: CPT | Performed by: NURSE PRACTITIONER

## 2024-07-15 PROCEDURE — 11000001 HC ACUTE MED/SURG PRIVATE ROOM

## 2024-07-15 PROCEDURE — 25000003 PHARM REV CODE 250: Performed by: PHYSICIAN ASSISTANT

## 2024-07-15 PROCEDURE — 25000003 PHARM REV CODE 250

## 2024-07-15 PROCEDURE — 27000221 HC OXYGEN, UP TO 24 HOURS

## 2024-07-15 PROCEDURE — 97116 GAIT TRAINING THERAPY: CPT

## 2024-07-15 PROCEDURE — 94761 N-INVAS EAR/PLS OXIMETRY MLT: CPT

## 2024-07-15 PROCEDURE — 94640 AIRWAY INHALATION TREATMENT: CPT

## 2024-07-15 PROCEDURE — 25000003 PHARM REV CODE 250: Performed by: NURSE PRACTITIONER

## 2024-07-15 RX ORDER — IPRATROPIUM BROMIDE AND ALBUTEROL SULFATE 2.5; .5 MG/3ML; MG/3ML
3 SOLUTION RESPIRATORY (INHALATION) EVERY 6 HOURS
Status: DISCONTINUED | OUTPATIENT
Start: 2024-07-15 | End: 2024-07-17 | Stop reason: HOSPADM

## 2024-07-15 RX ORDER — NITROGLYCERIN 0.4 MG/1
0.4 TABLET SUBLINGUAL EVERY 5 MIN PRN
Status: DISCONTINUED | OUTPATIENT
Start: 2024-07-15 | End: 2024-07-17 | Stop reason: HOSPADM

## 2024-07-15 RX ORDER — AMOXICILLIN 250 MG
2 CAPSULE ORAL 2 TIMES DAILY
Status: DISCONTINUED | OUTPATIENT
Start: 2024-07-15 | End: 2024-07-17 | Stop reason: HOSPADM

## 2024-07-15 RX ADMIN — HEPARIN SODIUM 5000 UNITS: 5000 INJECTION INTRAVENOUS; SUBCUTANEOUS at 09:07

## 2024-07-15 RX ADMIN — HEPARIN SODIUM 5000 UNITS: 5000 INJECTION INTRAVENOUS; SUBCUTANEOUS at 02:07

## 2024-07-15 RX ADMIN — CLOBAZAM 40 MG: 10 TABLET ORAL at 09:07

## 2024-07-15 RX ADMIN — SENNOSIDES AND DOCUSATE SODIUM 2 TABLET: 50; 8.6 TABLET ORAL at 09:07

## 2024-07-15 RX ADMIN — OXCARBAZEPINE 300 MG: 300 TABLET, FILM COATED ORAL at 09:07

## 2024-07-15 RX ADMIN — IPRATROPIUM BROMIDE AND ALBUTEROL SULFATE 3 ML: 2.5; .5 SOLUTION RESPIRATORY (INHALATION) at 08:07

## 2024-07-15 RX ADMIN — OXYCODONE HYDROCHLORIDE 5 MG: 5 TABLET ORAL at 02:07

## 2024-07-15 RX ADMIN — NITROGLYCERIN 0.4 MG: 0.4 TABLET, ORALLY DISINTEGRATING SUBLINGUAL at 11:07

## 2024-07-15 RX ADMIN — LEVETIRACETAM 2000 MG: 750 TABLET, FILM COATED ORAL at 09:07

## 2024-07-15 RX ADMIN — HEPARIN SODIUM 5000 UNITS: 5000 INJECTION INTRAVENOUS; SUBCUTANEOUS at 05:07

## 2024-07-15 RX ADMIN — OXYCODONE HYDROCHLORIDE 5 MG: 5 TABLET ORAL at 09:07

## 2024-07-15 RX ADMIN — OXYCODONE HYDROCHLORIDE 5 MG: 5 TABLET ORAL at 05:07

## 2024-07-15 RX ADMIN — ACETAMINOPHEN 650 MG: 325 TABLET ORAL at 02:07

## 2024-07-15 RX ADMIN — TORSEMIDE 40 MG: 20 TABLET ORAL at 09:07

## 2024-07-15 RX ADMIN — HUMAN ALBUMIN MICROSPHERES AND PERFLUTREN 0.66 MG: 10; .22 INJECTION, SOLUTION INTRAVENOUS at 10:07

## 2024-07-15 RX ADMIN — DEXTROSE MONOHYDRATE 1 G: 2.5 INJECTION INTRAVENOUS at 03:07

## 2024-07-15 RX ADMIN — LOSARTAN POTASSIUM 50 MG: 50 TABLET, FILM COATED ORAL at 09:07

## 2024-07-15 RX ADMIN — OXYCODONE HYDROCHLORIDE 5 MG: 5 TABLET ORAL at 01:07

## 2024-07-15 RX ADMIN — DEXTROSE MONOHYDRATE 1 G: 2.5 INJECTION INTRAVENOUS at 11:07

## 2024-07-15 RX ADMIN — NIFEDIPINE 30 MG: 30 TABLET, FILM COATED, EXTENDED RELEASE ORAL at 09:07

## 2024-07-15 NOTE — PT/OT/SLP PROGRESS
Occupational Therapy  Treatment    Name: Haydee Linda  MRN: [id number]  Admitting Diagnosis:  Other chest pain  6 Days Post-Op    Recommendations:     Discharge Recommendations: High Intensity Therapy  Discharge Equipment Recommendations:  bedside commode, walker, rolling  Barriers to discharge:  None    Assessment:     Haydee Linda is a 69 y.o. female with a medical diagnosis of Other chest pain.  She presents with decrease functional status secondary to medical diagnosis. Performance deficits affecting function are weakness, impaired endurance, impaired functional mobility, gait instability, impaired balance, decreased coordination, decreased upper extremity function, decreased lower extremity function, pain, impaired coordination. Patient with good tolerance to OT session demonstrating good functional ambulation and standing ADL tolerance. Patient limited by decreased activity tolerance and L sided weakness; with increase gait instability during prolonged standing due to L sided knee buckling. Patient remains appropriate candidate for acute OT services.     Rehab Prognosis:  Good; patient would benefit from acute skilled OT services to address these deficits and reach maximum level of function.       Plan:     Patient to be seen 4 x/week to address the above listed problems via self-care/home management, therapeutic activities, therapeutic exercises, neuromuscular re-education  Plan of Care Expires: 08/11/24  Plan of Care Reviewed with: patient, family    Subjective     Chief Complaint: none at this time   Patient/Family Comments/goals: DC     Objective:     Communicated with: RN prior to session.  Patient found supine with blood pressure cuff, oxygen, PureWick, pulse ox (continuous), SCD, telemetry upon OT entry to room.    General Precautions: Standard, fall    Orthopedic Precautions:N/A  Braces: N/A  Respiratory Status: Room air     Occupational Performance:     Bed Mobility:    Patient completed  Rolling/Turning to Right with stand by assistance  Patient completed Scooting/Bridging with stand by assistance  Patient completed Supine to Sit with minimum assistance for trunk management   Patient completed Sit to Supine with minimum assistance for LE management     Functional Mobility/Transfers:  Patient completed Sit <> Stand Transfer with moderate assistance  with  rolling walker from bed   Patient needing mod assist x2 from chair post prolonged standing trial   Patient completed stand pivot from bed <>chair  with moderate assistance   Functional Mobility: Patient completed 12' to bathroom with RW and close CGA assist     Activities of Daily Living:  Grooming: minimum assistance for tool management standing at sink; patient completed oral care and facial grooming; patient with prolonged standing trial lasting ~35 minutes; patient requiring CGA throughout with moments of min assist 2/2 L sided lean as patient fatigued      Treatment & Education:  Provided education regarding role of OT, POC, & discharge recommendations.  Pt with no further questions/concerns at this time. OT provided education on home recommendations and fall prevention in preparation for D/C.     Patient left supine with all lines intact and call button in reach    GOALS:   Multidisciplinary Problems       Occupational Therapy Goals          Problem: Occupational Therapy    Goal Priority Disciplines Outcome Interventions   Occupational Therapy Goal     OT, PT/OT Progressing    Description: Goals to be met by: 8/11/2024     Patient will increase functional independence with ADLs by performing:    UE Dressing with Minimal Assistance.  LE Dressing with Set-up Assistance.  Grooming while standing at sink with Supervision.  Toileting from toilet with Supervision for hygiene and clothing management.   Step transfer with Contact Guard Assistance  Toilet transfer to toilet with Supervision.                         Time Tracking:     OT Date of  Treatment: 07/15/24  OT Start Time: 0952  OT Stop Time: 1046  OT Total Time (min): 54 min    Billable Minutes:Self Care/Home Management 25  Therapeutic Activity 25    OT/SEAMUS: OT          7/15/2024

## 2024-07-15 NOTE — SUBJECTIVE & OBJECTIVE
Interval History: NAEON. This afternoon, patient sitting in recliner after working with therapy, SO at bedside. Patient reports feeling well, denies complaints. She reports anticipated discharge is tomorrow. Answered questions at bedside.    Current Facility-Administered Medications   Medication Dose Route Frequency Provider Last Rate Last Admin    acetaminophen tablet 650 mg  650 mg Oral Q6H PRN Candelario Summers, NP   650 mg at 07/15/24 1425    albuterol-ipratropium 2.5 mg-0.5 mg/3 mL nebulizer solution 3 mL  3 mL Nebulization Q6H Shayla Mancilla PA-C        bisacodyL suppository 10 mg  10 mg Rectal Daily PRN Deviak Keene NP   10 mg at 07/12/24 1649    cloBAZam Tab 40 mg  40 mg Oral QHS Jessica, Leti L, NP   40 mg at 07/14/24 2106    dextrose 10% bolus 125 mL 125 mL  12.5 g Intravenous PRN Gosia Ng PA-C        dextrose 10% bolus 250 mL 250 mL  25 g Intravenous PRN Gosia Ng PA-C        glucagon (human recombinant) injection 1 mg  1 mg Intramuscular PRN Gosia Ng PA-C        glucose chewable tablet 16 g  16 g Oral PRN Gosia Ng PA-C        glucose chewable tablet 24 g  24 g Oral PRN Gosia Ng PA-C        heparin (porcine) injection 5,000 Units  5,000 Units Subcutaneous Q8H Francisco Scruggs MD   5,000 Units at 07/15/24 1426    levETIRAcetam tablet 2,000 mg  2,000 mg Oral BID Love, Leti L, NP   2,000 mg at 07/15/24 0902    losartan tablet 50 mg  50 mg Oral Daily Love, Leti L, NP   50 mg at 07/15/24 0904    magnesium oxide tablet 800 mg  800 mg Oral PRN Love, Leti L, NP        magnesium oxide tablet 800 mg  800 mg Oral PRN Love, Leti L, NP        NIFEdipine 24 hr tablet 30 mg  30 mg Oral BID Love, Leti L, NP   30 mg at 07/15/24 0903    nitroGLYCERIN SL tablet 0.4 mg  0.4 mg Sublingual Q5 Min PRN Shayla Mancilla PA-C   0.4 mg at 07/15/24 1142    ondansetron injection 4 mg  4 mg Intravenous Q8H PRN Candelario Summers, NP   4 mg at 07/12/24 0731    OXcarbazepine tablet 300 mg  300 mg Oral BID Leti Lopez,  NP   300 mg at 07/15/24 0903    oxyCODONE immediate release tablet 5 mg  5 mg Oral Q4H PRN Walter Wilkins PA-C   5 mg at 07/15/24 1425    potassium bicarbonate disintegrating tablet 35 mEq  35 mEq Oral PRN Love, Leti L, NP        potassium bicarbonate disintegrating tablet 50 mEq  50 mEq Oral PRN Love, Leti L, NP        potassium bicarbonate disintegrating tablet 60 mEq  60 mEq Oral PRN Love, Leti L, NP        potassium, sodium phosphates 280-160-250 mg packet 2 packet  2 packet Oral PRN Love, Leti L, NP   2 packet at 07/12/24 1131    potassium, sodium phosphates 280-160-250 mg packet 2 packet  2 packet Oral PRN Love, Leti L, NP        potassium, sodium phosphates 280-160-250 mg packet 2 packet  2 packet Oral PRN Love, Leti L, NP        promethazine (PHENERGAN) 12.5 mg in 0.9% NaCl 50 mL IVPB  12.5 mg Intravenous Q6H PRN Candelario Summers, SCOTT        senna-docusate 8.6-50 mg per tablet 2 tablet  2 tablet Oral BID Shayla Mancilla PA-C   2 tablet at 07/15/24 0902    sodium chloride 0.9% flush 10 mL  10 mL Intravenous PRN Moises Bonds MD   10 mL at 07/14/24 0549    torsemide tablet 40 mg  40 mg Oral Daily Luly Singh MD   40 mg at 07/15/24 0902     Continuous Infusions:    Review of Systems  Objective:     Vital Signs (Most Recent):  Temp: 98.8 °F (37.1 °C) (07/15/24 1209)  Pulse: 102 (07/15/24 1429)  Resp: 18 (07/15/24 1425)  BP: 120/72 (07/15/24 1209)  SpO2: 99 % (07/15/24 1209) Vital Signs (24h Range):  Temp:  [98.2 °F (36.8 °C)-98.9 °F (37.2 °C)] 98.8 °F (37.1 °C)  Pulse:  [] 102  Resp:  [18-20] 18  SpO2:  [94 %-99 %] 99 %  BP: (107-130)/(58-72) 120/72     Weight: 75.8 kg (167 lb)  Body mass index is 25.39 kg/m².     Physical Exam  Constitutional:       General: She is not in acute distress.     Appearance: She is not diaphoretic.      Interventions: Nasal cannula in place.   HENT:      Head:      Comments: Incision C/D/I, staples intact  No erythema or drainage  Eyes:       General: No scleral icterus.        Right eye: No discharge.         Left eye: No discharge.      Conjunctiva/sclera: Conjunctivae normal.      Pupils: Pupils are equal, round, and reactive to light.   Cardiovascular:      Rate and Rhythm: Normal rate.   Pulmonary:      Effort: Pulmonary effort is normal. No respiratory distress.   Musculoskeletal:         General: No deformity or signs of injury.   Skin:     General: Skin is warm and dry.   Psychiatric:         Speech: Speech normal.         Behavior: Behavior is not agitated. Behavior is cooperative.            NEUROLOGICAL EXAMINATION:     MENTAL STATUS   Speech: speech is normal     CRANIAL NERVES     CN III, IV, VI   Pupils are equal, round, and reactive to light.       Awake, alert  ANDERS OU   Corneal intact OU   + spontaneous eye opening   Face symmetric   Tongue midline   Moves all extremities spontaneously    Exam findings to suggest seizures:  Myoclonus - no   eye twitching - no   Nystagmus - no   gaze deviation - no   waxy rigidity - no        Significant Labs: All pertinent lab results from the past 24 hours have been reviewed.    Significant Studies: I have reviewed all pertinent imaging results/findings within the past 24 hours.

## 2024-07-15 NOTE — ASSESSMENT & PLAN NOTE
Seen by cardiology 7/14. Most likely secondary to vasospastic angina in the setting of patient reported hx. Not related to ACS.   - Recommend PRN SL Nitroglycerin 0.4mg for chest pain as blood pressure tolerates.   - As an outpatient, can consider up-titrating her home nifedipine and starting beta blocker. Blood pressures relatively on the lower side right now so will hold making those changes now.   - If continues will reconsult to consider changes to nifedipine/BB.

## 2024-07-15 NOTE — PT/OT/SLP PROGRESS
Physical Therapy Treatment    Patient Name:  Haydee Linda   MRN:  [id number]    Recommendations:     Discharge Recommendations: High Intensity Therapy  Discharge Equipment Recommendations: bedside commode, walker, rolling  Barriers to discharge: None    Assessment:     Haydee Linda is a 69 y.o. female admitted with a medical diagnosis of Other chest pain. Patient presented with increased motivation to participate in treatment session, with good response to completed activities and provided education. Continued progress with WB activities captured today, evidenced by decreased assist levels; decreased cuing & facilitation; and increased distance gait trained. Rolling walker utilized for all WB activities this date. Patient demonstrating mild L knee instability upon 2nd gait trial, however no instances of buckling noted. Continued observation of gait deviations encompassing posture, step characteristics, gait speed, and VINCE. Patient with tendency to further decrease gait speed & step kristen with progressive fatigue. Patient has demonstrated sufficient progression to warrant high intensity therapy evidenced by objectives noted below. Performance deficits impacting function include weakness, impaired endurance, impaired functional mobility, impaired balance, gait instability, decreased lower extremity function, decreased upper extremity function, impaired coordination.    Rehab Prognosis: Good; patient would benefit from acute skilled PT services to address these deficits and reach maximum level of function.    Recent Surgery: * No procedures listed * 6 Days Post-Op    Plan:     During this hospitalization, patient to be seen 4 x/week to address the identified rehab impairments via gait training, therapeutic activities, therapeutic exercises, neuromuscular re-education and progress toward the following goals:    Plan of Care Expires:  08/11/24    Subjective     Chief Complaint: None stated  Patient/Family  "Comments/goals: To discharge  Pain/Comfort:  Pain Rating 1: 0/10  Pain Rating Post-Intervention 1: 0/10      Objective:     Communicated with Nsg prior to session.  Patient found HOB elevated with blood pressure cuff, oxygen, PureWick, pulse ox (continuous), SCD, telemetry, peripheral IV upon PT entry to room.     General Precautions: Standard, fall   Orthopedic Precautions:N/A   Braces: N/A   Body mass index is 25.39 kg/m².  Oxygen Device: Nasal Cannula 2L  Vitals: BP (!) 83/54 (BP Location: Right arm, Patient Position: Sitting)   Pulse 92   Temp 98.3 °F (36.8 °C) (Oral)   Resp 18   Ht 5' 8" (1.727 m)   Wt 75.8 kg (167 lb)   SpO2 99%   Breastfeeding No   BMI 25.39 kg/m²      Functional Mobility:  Bed Mobility:     EOB Scooting:   Anterior: Stand-By Assistance - Minimal Assistance  Supine>Sit: x1, Stand-By Assistance with HOB Elevated  *VC/TC for task sequencing    Transfers:     Sit<>Stand:   x1, Minimal Assistance from Edge of Bed with Rolling Walker  x1, Moderate Assistance from Toilet with Rolling Walker  Chair Transfer: x1, Contact Guard Assistance - Minimal Assistance with Rolling Walker using step transfer technique to R  Toilet Transfer: x1, Minimal Assistance with Rolling Walker using step transfer technique L  *VC/TC for task sequencing, AD management, UE placement  *Facilitation of AD management-emphasis on change of direction    Gait:  2x8ft, Contact Guard Assistance with Rolling Walker  Gait Assessment: decreased step length, decreased step height, decreased gait speed, narrow base of support, impaired L TKE  Total Distance: 16ft  *VC/TC for upright posturing, glute engagement, L quad activation during stance, AD management  *Facilitation of AD management-emphasis on change of direction    Balance:   Static Sitting: Supervision  Dynamic Sitting: Stand-By Assistance    Static Standing: Close Stand-By Assistance - Contact Guard Assistance  Dynamic Standing: Contact Guard Assistance - Minimal " Assistance  *VC/TC for upright posturing, glute engagement, L quad activation, AD management  *Facilitation of AD management    AM-PAC 6 CLICK MOBILITY  Turning over in bed (including adjusting bedclothes, sheets and blankets)?: 4  Sitting down on and standing up from a chair with arms (e.g., wheelchair, bedside commode, etc.): 3  Moving from lying on back to sitting on the side of the bed?: 3  Moving to and from a bed to a chair (including a wheelchair)?: 3  Need to walk in hospital room?: 3  Climbing 3-5 steps with a railing?: 2  Basic Mobility Total Score: 18     Treatment & Education:  Increased patient contact time due to:  Toileting & related hygiene  Seated/standing rest breaks    Patient Education Provided on:  The role of physical therapy and how the patient can benefit from skilled services  The negative effects of prolonged bed rest/sedentary behavior, along with the importance of OOB activity & patient participation with PT  Current post-discharge PT recommendation and why  The importance of contacting RN, via call light, for mobility throughout the day  Pt white board updated with current therapists name and level of mobility assistance needed.     Patient and spouse Verbalized understanding of all topics touched on this date. All questions answered within the PT scope of practice    Patient left up in chair with all lines intact, call button in reach, chair alarm on, RN notified, and PA-C present.    GOALS:   Multidisciplinary Problems       Physical Therapy Goals          Problem: Physical Therapy    Goal Priority Disciplines Outcome Goal Variances Interventions   Physical Therapy Goal     PT, PT/OT Progressing     Description: Goals to be met by: 24    Patient will increase functional independence with mobility by performin. Supine to sit with Trigg  2. Sit to supine with Trigg  3. Sit to stand transfer with Supervision with or without LRAD  4. Gait  x 20 feet with Contact  Guard Assistance with or without LRAD   5. Ascend/descend 2 stair with Minimal Assistance with or without LRAD   6. Sitting at edge of bed 10 minutes with Swisher  7. Stand for 3 minutes with Modified Swisher with or without LRAD  8. Lower extremity exercise program x20 reps per handout, with assistance as needed                         Time Tracking:     PT Received On: 07/15/24  PT Start Time: 1404     PT Stop Time: 1459  PT Total Time (min): 55 min     Billable Minutes: Gait Training 10 and Therapeutic Activity 45    Treatment Type: Treatment  PT/PTA: PT     Number of PTA visits since last PT visit: 0     07/15/2024

## 2024-07-15 NOTE — ASSESSMENT & PLAN NOTE
- SM1 right temporal AVM s/p angiogram with kd embolization by IR on 7/9  - S/p right frontotemporal craniotomy for resection of AVM by Dr. Bo and right temporal lobectomy by Dr. Lynda Patel on 7/10  - MRI Brain WO with expected postop changes, 2 mm leftward MLS, thin extra-axial collection vs postop dural plasty material (felt more likely)  - Management per NSGY

## 2024-07-15 NOTE — ASSESSMENT & PLAN NOTE
70 yo female who presented for elective AVM embolization on 7/9 and anterior temporal lobectomy on 7/10.    CTH: Resection of REE. No new acute hemorrhages  MRI: resection of both REE and AVM. No acute hydrocephalus or hemorrhage.    Recommendations:  --SBP<140  --PT/OT  --Restarted on home AEDs  --PT/OT/OOB  --LUIS ENRIQUE/SCD/SQH  --Please call NSGY with any acute decline in neurological exam    Discussed with Dr. Patel    Dispo: ongoing; anticipate home.

## 2024-07-15 NOTE — PLAN OF CARE
[facility] - Neurosurgery (Hospital)  Discharge Reassessment    Primary Care Provider: No, Primary Doctor    Expected Discharge Date: 7/17/2024    Reassessment (most recent)       Discharge Reassessment - 07/15/24 1150          Discharge Reassessment    Assessment Type Discharge Planning Reassessment     Discharge Plan discussed with: Spouse/sig other;Patient     Name(s) and Number(s) (S/o) Figueroa [phone]     Communicated JUDSON with patient/caregiver Date not available/Unable to determine     Discharge Plan A Home;Home with family     Discharge Plan B Home;Home with family;Home Health   TBD    DME Needed Upon Discharge  oxygen;walker, rolling   TBD    Why the patient remains in the hospital Requires continued medical care        Post-Acute Status    Discharge Delays None known at this time                 Pt not medically ready for d/c.   SW met w/ pt and pt - Figueroa at bedside for check-in and discuss d/c planning. Pt declined rehab or SNF. Pt stated her plan upon d/c is to return home w/ HH; preference [facility] in [place], [place] (which family has worked with company in the past). 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 Pt stated upon d/c she is open to all equipments that is needed but doesn't need a hospital bed due to bedroom is on first floor of their 3 story home. Pt disclosed she also has a medical alert and a seizure watch at home and pt stated if needed she can look for a sitter. MOHINI informed pt and pt  that CM dept will continue following along w/ the treatment team for recommendation/needs.     Discharge Plan A and Plan B have been determined by review of patient's clinical status, future medical and therapeutic needs, and coverage/benefits for post-acute care in coordination with multidisciplinary team members.    Evelyne Benton, RUBIA   [facility]    Case Management Dept  [phone]
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
## 2024-07-15 NOTE — ASSESSMENT & PLAN NOTE
Patient reported HFrEF  [facility] 8/2022: The left main is angiographically normal. The left anterior descending is angiographically normal. The circumflex is angiographically normal. The right coronary artery is a dominant vessel of the heart and angiographically normal. Left Ventriculography a single PATRICK ventriculogram was performed revealing an EF of 55-60%.  - Repeat echo today with low normal to mildly reduced EF at 50%  - Management per primary team

## 2024-07-15 NOTE — ASSESSMENT & PLAN NOTE
- Remote histroy of right temporal intraparenchymal hemorrhage complicated by medically refractory epilepsy   - Management per primary team

## 2024-07-15 NOTE — PLAN OF CARE
[facility] - Neurosurgery ([facility]      HOME HEALTH ORDERS  FACE TO FACE ENCOUNTER    Patient Name: Haydee Linda  YOB: 1955    PCP: Tamy, Primary Doctor   PCP Address: None  PCP Phone Number: None  PCP Fax: None    Encounter Date: 7/9/24    Admit to Home Health    Diagnoses:  Active Hospital Problems    Diagnosis  POA    *Other chest pain [R07.89]  Yes    AVM (arteriovenous malformation) brain [Q28.2]  Not Applicable    Primary hypertension [I10]  Yes    Refractory epilepsy [G40.919]  Yes    History of spontaneous intraparenchymal intracranial hemorrhage [Z86.79]  Not Applicable    Secondary seizure disorder [G40.909]  Yes    Congestive heart failure [I50.9]  Yes      Resolved Hospital Problems   No resolved problems to display.       Follow Up Appointments:  Future Appointments   Date Time Provider Department Center   7/24/2024  9:30 AM Salma Chong RN [facility] NEUROS8 [facility]       Allergies:  Review of patient's allergies indicates:   Allergen Reactions    Opioids - morphine analogues Hallucinations     Can tolerate Codeine, Dilaudid, oxycodone, hydrocodone       Medications: Review discharge medications with patient and family and provide education.    Current Facility-Administered Medications   Medication Dose Route Frequency Provider Last Rate Last Admin    acetaminophen tablet 650 mg  650 mg Oral Q6H PRN Candelario Summers NP   650 mg at 07/14/24 1254    bisacodyL suppository 10 mg  10 mg Rectal Daily PRN Devika Keene NP   10 mg at 07/12/24 1649    cloBAZam Tab 40 mg  40 mg Oral QHS Leti Lopez NP   40 mg at 07/14/24 2106    dextrose 10% bolus 125 mL 125 mL  12.5 g Intravenous PRN Gosia Ng PA-C        dextrose 10% bolus 250 mL 250 mL  25 g Intravenous PRN Gosia Ng PA-C        glucagon (human recombinant) injection 1 mg  1 mg Intramuscular PRN Gosia Ng PA-C        glucose chewable tablet 16 g  16 g Oral PRN Gosia Ng PA-C        glucose chewable tablet 24 g  24 g Oral  PRN Gosia Ng PA-C        heparin (porcine) injection 5,000 Units  5,000 Units Subcutaneous Q8H Francisco Scruggs MD   5,000 Units at 07/15/24 0538    levETIRAcetam tablet 2,000 mg  2,000 mg Oral BID Love, Leti L, NP   2,000 mg at 07/15/24 0902    losartan tablet 50 mg  50 mg Oral Daily Love, Leti L, NP   50 mg at 07/15/24 0904    magnesium oxide tablet 800 mg  800 mg Oral PRN Love, Leti L, NP        magnesium oxide tablet 800 mg  800 mg Oral PRN Love, Leti L, NP        NIFEdipine 24 hr tablet 30 mg  30 mg Oral BID Love, Leti L, NP   30 mg at 07/15/24 0903    nitroGLYCERIN SL tablet 0.4 mg  0.4 mg Sublingual Q5 Min PRN Shayla Mancilla PA-C   0.4 mg at 07/15/24 1142    ondansetron injection 4 mg  4 mg Intravenous Q8H PRN Candelario Summers NP   4 mg at 07/12/24 0731    OXcarbazepine tablet 300 mg  300 mg Oral BID Love, Leti L, NP   300 mg at 07/15/24 0903    oxyCODONE immediate release tablet 5 mg  5 mg Oral Q4H PRN Wlater Wilkins PA-C   5 mg at 07/15/24 0538    potassium bicarbonate disintegrating tablet 35 mEq  35 mEq Oral PRN Love, Leti L, NP        potassium bicarbonate disintegrating tablet 50 mEq  50 mEq Oral PRN Love, Leti L, NP        potassium bicarbonate disintegrating tablet 60 mEq  60 mEq Oral PRN Love, Leti L, NP        potassium, sodium phosphates 280-160-250 mg packet 2 packet  2 packet Oral PRN Love, Leti L, NP   2 packet at 07/12/24 1131    potassium, sodium phosphates 280-160-250 mg packet 2 packet  2 packet Oral PRN Love, Leti L, NP        potassium, sodium phosphates 280-160-250 mg packet 2 packet  2 packet Oral PRN Love, Leti L, NP        promethazine (PHENERGAN) 12.5 mg in 0.9% NaCl 50 mL IVPB  12.5 mg Intravenous Q6H PRN Candelario Summers NP        senna-docusate 8.6-50 mg per tablet 2 tablet  2 tablet Oral BID Shayla Mancilla PA-C   2 tablet at 07/15/24 0902    sodium chloride 0.9% flush 10 mL  10 mL Intravenous PRN Moises Bonds MD   10 mL at 07/14/24 0549    torsemide  tablet 40 mg  40 mg Oral Daily Luly Singh MD   40 mg at 07/15/24 0902     Current Discharge Medication List        CONTINUE these medications which have NOT CHANGED    Details   acetaminophen (TYLENOL) 500 MG tablet Take 1,000 mg by mouth daily as needed for Pain.      bacitracin 500 unit/gram ointment Apply topically 2 (two) times daily. To inside of nasal passages      cloBAZam (ONFI) 20 mg Tab Take 2 tablets (40 mg total) by mouth every evening.  Qty: 60 tablet, Refills: 5    Associated Diagnoses: Refractory epilepsy      docusate sodium (COLACE) 100 MG capsule Take 100 mg by mouth 2 (two) times daily.      levETIRAcetam (KEPPRA) 1000 MG tablet Take 2 tablets (2,000 mg total) by mouth 2 (two) times daily.  Qty: 360 tablet, Refills: 3    Associated Diagnoses: Refractory epilepsy      losartan (COZAAR) 50 MG tablet Take 50 mg by mouth once daily.      magnesium gluconate 27.5 mg magne- sium (500 mg) Tab Take 500 mg by mouth 2 (two) times daily.      methyl salicylate/menthol (ICY HOT TOP) Apply topically. With lidocaine      multivitamin (THERAGRAN) per tablet Take 1 tablet by mouth once daily. With vegetables      NIFEdipine (PROCARDIA-XL) 30 MG (OSM) 24 hr tablet Take 30 mg by mouth 2 (two) times a day.      OXcarbazepine (TRILEPTAL) 300 MG Tab Take 1 tablet (300 mg total) by mouth 2 (two) times daily.  Qty: 180 tablet, Refills: 3    Associated Diagnoses: Refractory epilepsy      potassium chloride 10% (KAYCIEL) 20 mEq/15 mL oral solution Take 40 mEq by mouth 2 (two) times daily. She has not taking liquid potassium and is taking 40 mEq by mouth once a day in a pill form      torsemide (DEMADEX) 20 MG Tab Take 40 mg by mouth once daily.      UNABLE TO FIND medication name: SUPER BEET      midazolam (NAYZILAM) 5 mg/spray (0.1 mL) Spry 1 spray by Nasal route every 10 (ten) minutes as needed (cluster seizures). 1 spray for seizures more than 3min or more than 3 seizures in 24 hours. May give second  dose after 10 min if seizures persist.  Qty: 2 each, Refills: 5    Comments: Use for treatment of no more than 1 episode (1-2 sprays) every three days, no more than 5 episodes (1-2 sprays) in a month  Associated Diagnoses: Refractory epilepsy               I have seen and examined this patient within the last 30 days. My clinical findings that support the need for the home health skilled services and home bound status are the following:no   Weakness/numbness causing balance and gait disturbance due to Weakness/Debility, Surgery, and epilepsy making it taxing to leave home.  Requiring assistive device to leave home due to unsteady gait caused by  Weakness/Debility, Surgery, and epilepsy.     Diet:   regular diet    Labs:  N/A    Referrals/ Consults  Physical Therapy to evaluate and treat. Evaluate for home safety and equipment needs; Establish/upgrade home exercise program. Perform / instruct on therapeutic exercises, gait training, transfer training, and Range of Motion.  Occupational Therapy to evaluate and treat. Evaluate home environment for safety and equipment needs. Perform/Instruct on transfers, ADL training, ROM, and therapeutic exercises.    Activities:   activity as tolerated    Nursing:   Agency to admit patient within 24 hours of hospital discharge unless specified on physician order or at patient request    SN to complete comprehensive assessment including routine vital signs. Instruct on disease process and s/s of complications to report to MD. Review/verify medication list sent home with the patient at time of discharge  and instruct patient/caregiver as needed. Frequency may be adjusted depending on start of care date.     Skilled nurse to perform up to 3 visits PRN for symptoms related to diagnosis    Notify MD if SBP > 160 or < 90; DBP > 90 or < 50; HR > 120 or < 50; Temp > 101; O2 < 88%    Ok to schedule additional visits based on staff availability and patient request on consecutive days within  the home health episode.    When multiple disciplines ordered:    Start of Care occurs on Sunday - Wednesday schedule remaining discipline evaluations as ordered on separate consecutive days following the start of care.    Thursday SOC -schedule subsequent evaluations Friday and Monday the following week.     Friday - Saturday SOC - schedule subsequent discipline evaluations on consecutive days starting Monday of the following week.    For all post-discharge communication and subsequent orders please contact patient's primary care physician. If unable to reach primary care physician or do not receive response within 30 minutes, please contact Dr. Patel staff for clinical staff order clarification    Miscellaneous   Home Oxygen:  Oxygen at 2 L/min nasal canula to be used:  Continuously and As needed for SOB., Assess oxygen saturation via pulse oximeter as needed for increase in SOB., and Notify physician if oxygen saturation less than 88%    Home Health Aide:  Physical Therapy Three times weekly and Occupational Therapy Three times weekly    Wound Care Orders  yes:  Surgical Wound:  Location: scalp    Monitor for signs of infection       I certify that this patient is confined to her home and needs physical therapy and occupational therapy.

## 2024-07-15 NOTE — ASSESSMENT & PLAN NOTE
Secondary seizure disorder  69F PMHx of HFrEF and right temporal intraparenchymal hemorrhage complicated by medically refractory epilepsy currently maintained on Clobazam 40 mg qhs, Levetiracetam 2g BID, and Oxcarbazepine 300 mg BID now managed by Dr. 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 Now s/p right frontotemporal craniotomy for resection of AVM by Dr. Bo and right temporal lobectomy by Dr. Lynda Patel on 7/10. Epilepsy following for assistance in management.    Recommendations:  - Recommend to continue outpatient AED regimen on discharge: Clobazam 40 mg qhs, Levetiracetam 2g BID, and Oxcarbazepine 300 mg BID   - Avoid agents that lower seizure threshold  - Postoperative management per NSGY  - Management of infectious/metabolic abnormalities per primary team  - Seizure precautions    Discussed plan of care with patient and SO at bedside. Will follow peripherally, please call with questions.

## 2024-07-15 NOTE — PLAN OF CARE
Problem: Physical Therapy  Goal: Physical Therapy Goal  Description: Goals to be met by: 24    Patient will increase functional independence with mobility by performin. Supine to sit with [place]  2. Sit to supine with [place]  3. Sit to stand transfer with Supervision with or without LRAD  4. Gait  x 20 feet with Contact Guard Assistance with or without LRAD   5. Ascend/descend 2 stair with Minimal Assistance with or without LRAD   6. Sitting at edge of bed 10 minutes with McKinley  7. Stand for 3 minutes with Modified McKinley with or without LRAD  8. Lower extremity exercise program x20 reps per handout, with assistance as needed    Outcome: Progressing

## 2024-07-15 NOTE — PLAN OF CARE
07/15/24 1443   Post-Acute Status   Post-Acute Authorization Home Health   Home Health Status Referrals Sent  (Referral sent via email to [facility])   Coverage MEDICARE - MEDICARE PART A & B   Discharge Delays None known at this time   Discharge Plan   Discharge Plan A Home;Home with family   Discharge Plan B Home;Home with family;Home Health     MOHINI met w/ pt and pt -Figueroa to review discharge recommendation of Rehab, pt declined and stated she would like to d/c home w/ PT and will see if she need OT once she is home. 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     Patient/family declined a list and stated her preference is [facility] in [place], [place]     Notified that referral sent to below listed facilities from in-network list based on proximity to home/family support:   Patient/family declined a list and stated her preference is [facility] in [place], [place] (MOHINI asher w/ Lydia who informed they only do PT and asked that pt referral be emailed to: [email or web address]- MOHINI emailed HH orders, she provided [facility] # for [phone])   2.  3.  4.  5. (can send more than 5)    Patient/family instructed to identify preference.    Preferred Facility: (if more than 1, listed in order of descending preference)  Patient/family declined a list and stated her preference is [facility] in [place], [place]     If an additional preferred facility not listed above is identified, additional referral to be sent. If above facilities unable to accept, will send additional referrals to in-network providers.     MOHINI contacted [facility] regarding Home OT, was informed they do not do home OT, only in clinic and provided  corporate # [phone]. MOHINI contacted corporate and lakeshia w/ Kirsten Curtis provided [facility] # [phone] to inquire about home OT. MOHINI contacted [facility] and was informed they only do OT in clinic. MOHINI spannamaria w/ pt  at bedside to provide update and he stated they will wait  until pt get home to see if she need OT (MOHINI secure chat pt PA to provide update and was informed ok).     MOHINI contacted [facility] [phone] to inquire about home O2 (MOHINI was informed by [facility] de[facility] pt is out of area for O2), MOHINI spk w/ April at [facility], she stated she will need referral w/ walk test if pt need O2 to be faxed over to [phone].     Discharge Plan A and Plan B have been determined by review of patient's clinical status, future medical and therapeutic needs, and coverage/benefits for post-acute care in coordination with multidisciplinary team members.    Evelyne Benton, RUBIA   [facility]    Case Management Dept  [phone]

## 2024-07-15 NOTE — SUBJECTIVE & OBJECTIVE
Interval History: NAEON. AFVSS. Echo today. C/o chest pain again, relieved with nitro ordered per cardiology recs. Participated with therapy. Exam stable. Bowel regimen advanced.    Medications:  Continuous Infusions:  Scheduled Meds:   cloBAZam  40 mg Oral QHS    heparin (porcine)  5,000 Units Subcutaneous Q8H    levETIRAcetam  2,000 mg Oral BID    losartan  50 mg Oral Daily    NIFEdipine  30 mg Oral BID    OXcarbazepine  300 mg Oral BID    senna-docusate 8.6-50 mg  2 tablet Oral BID    torsemide  40 mg Oral Daily     PRN Meds:  Current Facility-Administered Medications:     acetaminophen, 650 mg, Oral, Q6H PRN    bisacodyL, 10 mg, Rectal, Daily PRN    dextrose 10%, 12.5 g, Intravenous, PRN    dextrose 10%, 25 g, Intravenous, PRN    glucagon (human recombinant), 1 mg, Intramuscular, PRN    glucose, 16 g, Oral, PRN    glucose, 24 g, Oral, PRN    magnesium oxide, 800 mg, Oral, PRN    magnesium oxide, 800 mg, Oral, PRN    nitroGLYCERIN, 0.4 mg, Sublingual, Q5 Min PRN    ondansetron, 4 mg, Intravenous, Q8H PRN    oxyCODONE, 5 mg, Oral, Q4H PRN    potassium bicarbonate, 35 mEq, Oral, PRN    potassium bicarbonate, 50 mEq, Oral, PRN    potassium bicarbonate, 60 mEq, Oral, PRN    potassium, sodium phosphates, 2 packet, Oral, PRN    potassium, sodium phosphates, 2 packet, Oral, PRN    potassium, sodium phosphates, 2 packet, Oral, PRN    promethazine (PHENERGAN) 12.5 mg in 0.9% NaCl 50 mL IVPB, 12.5 mg, Intravenous, Q6H PRN    sodium chloride 0.9%, 10 mL, Intravenous, PRN     Review of Systems  Objective:     Weight: 75.8 kg (167 lb)  Body mass index is 25.39 kg/m².  Vital Signs (Most Recent):  Temp: 98.8 °F (37.1 °C) (07/15/24 1209)  Pulse: 87 (07/15/24 1209)  Resp: 20 (07/15/24 1209)  BP: 120/72 (07/15/24 1209)  SpO2: 99 % (07/15/24 1209) Vital Signs (24h Range):  Temp:  [98.2 °F (36.8 °C)-98.9 °F (37.2 °C)] 98.8 °F (37.1 °C)  Pulse:  [75-94] 87  Resp:  [18-20] 20  SpO2:  [94 %-99 %] 99 %  BP: (107-130)/(58-72) 120/72  "                        Female External Urinary Catheter w/ Suction 07/12/24 2301 (Active)   Skin no redness;no breakdown 07/14/24 0800   Tolerance no signs/symptoms of discomfort 07/14/24 0800   Suction Continuous suction at 70 mmHg 07/14/24 0800   Date of last wick change 07/14/24 07/14/24 0800   Time of last wick change 2301 07/13/24 0236   Output (mL) 500 mL 07/14/24 1300          Physical Exam     Neurosurgery Physical Exam    General: well developed, well nourished, no distress.   Head: normocephalic  Neurologic: Alert and oriented. Thought content appropriate.  GCS: Motor: 6/Verbal: 5/Eyes: 4 GCS Total: 15  Mental Status: Awake, Alert, Oriented x 4  Language: No aphasia  Speech: No dysarthria  Cranial nerves: face symmetric, tongue midline, CN II-XII grossly intact.   Eyes: pupils equal, round, reactive to light with accomodation, EOMI.   Pulmonary: normal respirations, no signs of respiratory distress  Sensory: intact to light touch throughout  Motor Strength: Moves all extremities spontaneously with good tone.  Full strength BUE, RLE. Trace weakness 4+ LLE. No abnormal movements seen.   Pronator Drift: no drift noted  Finger-to-nose: Intact bilaterally  Skin: Skin is warm, dry and intact.  Incision c/d/I with skin edges well approximated with staples. No drainage from incision.      Significant Labs:  Recent Labs   Lab 07/14/24  0426 07/15/24  0300    106    136   K 4.2 3.7    95   CO2 27 29   BUN 9 15   CREATININE 0.7 0.8   CALCIUM 9.9 10.3   MG 1.7 1.6     Recent Labs   Lab 07/14/24  0426 07/15/24  0300   WBC 6.91 7.71   HGB 8.4* 9.1*   HCT 26.9* 28.4*    319     No results for input(s): "LABPT", "INR", "APTT" in the last 48 hours.  Microbiology Results (last 7 days)       ** No results found for the last 168 hours. **          Recent Lab Results         07/15/24  0911   07/15/24  0300        Albumin   3.4       ALP   84       ALT   19       Anion Gap   12       Ao peak audi " 1.53         Ao VTI 24.79         AST   19       AV valve area 2.42         JEMAL by Velocity Ratio 1.95         AV mean gradient 5         AV index (prosthetic) 0.58         AV peak gradient 9         AV Velocity Ratio 0.47         Baso #   0.05       Basophil %   0.6       BILIRUBIN TOTAL   0.3  Comment: For infants and newborns, interpretation of results should be based  on gestational age, weight and in agreement with clinical  observations.    Premature Infant recommended reference ranges:  Up to 24 hours.............<8.0 mg/dL  Up to 48 hours............<12.0 mg/dL  3-5 days..................<15.0 mg/dL  6-29 days.................<15.0 mg/dL         BSA 1.91         BUN   15       Calcium   10.3       Chloride   95       CO2   29       Creatinine   0.8       Left Ventricle Relative Wall Thickness 0.41         Differential Method   Automated       E/E' ratio 9.08         eGFR   >60.0       EF 50         Eos #   0.1       Eos %   1.0       FS 19         Glucose   106       Gran # (ANC)   5.4       Gran %   70.6       Hematocrit   28.4       Hemoglobin   9.1       Immature Grans (Abs)   0.03  Comment: Mild elevation in immature granulocytes is non specific and   can be seen in a variety of conditions including stress response,   acute inflammation, trauma and pregnancy. Correlation with other   laboratory and clinical findings is essential.         Immature Granulocytes   0.4       IVSd 0.96         LA WIDTH 3.21         Left Atrium Major Axis 5.19         Left Atrium Minor Axis 4.78         LA size 2.59         LA volume 35.17         LA vol index 18.6         LVOT area 4.2         LV LATERAL E/E' RATIO 9.83         LV SEPTAL E/E' RATIO 8.43         LV EDV .58         LV Diastolic Volume Index 53.75         LVIDd 4.68         LVIDs 3.80         LV mass 155.60         LV Mass Index 82         LVOT diameter 2.3         LVOT peak audi 0.72         LVOT stroke volume 59.88         LVOT peak VTI 14.42         LV  ESV BP 61.78         LV Systolic Volume Index 32.7         Lymph #   1.4       Lymph %   18.2       Magnesium    1.6       MCH   29.5       MCHC   32.0       MCV   92       Mean e' 0.07         Mono #   0.7       Mono %   9.2       MPV   10.1       MV Peak E Wilian 0.59         nRBC   0       Phosphorus Level   4.4       Platelet Count   319       Potassium   3.7       PROTEIN TOTAL   7.1       Posterior Wall 0.97         RA Major Axis 4.67         Est. RA pres 3         RA Width 3.24         RBC   3.08       RDW   12.3       RV- ying basal diam 2.6         Sinus 3.36         Sodium   136       STJ 3.10         TAPSE 2.40         TDI SEPTAL 0.07         TDI LATERAL 0.06         WBC   7.71       ZLVIDD -1.23         ZLVIDS 1.21               All pertinent labs from the last 24 hours have been reviewed.    Significant Diagnostics:  I have reviewed all pertinent imaging results/findings within the past 24 hours.

## 2024-07-15 NOTE — PROGRESS NOTES
[facility] - Neuro[facility] ([facility])  Neurology-Epilepsy  Progress Note    Patient Name: Haydee Linda  MRN: [id number]  Admission Date: 7/9/2024  Hospital Length of Stay: 6 days  Code Status: Full Code   Attending Provider: Lynda Patel MD  Primary Care Physician: Tamy, Primary Doctor   Principal Problem:Refractory epilepsy    Subjective:     Hospital Course:   7/10>7/11: S/p right frontotemporal craniotomy for resection of AVM by Dr. Bo and right temporal lobectomy by Dr. Lynda Patel on 7/10. Epilepsy following for assistance in management. Recommend to continue outpatient AED regimen: Clobazam 40 mg qhs, Levetiracetam 2g BID, and Oxcarbazepine 300 mg BID.  7/11>7/12: Patient doing well postoperatively. No clinical seizures. Recommend to continue AED regimen.    7/14>7/15: Recommend to continue AED regimen.    Interval History: NAEON. This afternoon, patient sitting in recliner after working with therapy, SO at bedside. Patient reports feeling well, denies complaints. She reports anticipated discharge is tomorrow. Answered questions at bedside.    Current Facility-Administered Medications   Medication Dose Route Frequency Provider Last Rate Last Admin    acetaminophen tablet 650 mg  650 mg Oral Q6H PRN Candelario Summers NP   650 mg at 07/15/24 1425    albuterol-ipratropium 2.5 mg-0.5 mg/3 mL nebulizer solution 3 mL  3 mL Nebulization Q6H Shayla Mancilla PA-C        bisacodyL suppository 10 mg  10 mg Rectal Daily PRN Devika Keene NP   10 mg at 07/12/24 1649    cloBAZam Tab 40 mg  40 mg Oral QHS Leti Lopez NP   40 mg at 07/14/24 2106    dextrose 10% bolus 125 mL 125 mL  12.5 g Intravenous PRN Gosia Ng PA-C        dextrose 10% bolus 250 mL 250 mL  25 g Intravenous PRN Gosia Ng PA-C        glucagon (human recombinant) injection 1 mg  1 mg Intramuscular PRN Gosia Ng PA-C        glucose chewable tablet 16 g  16 g Oral PRN Gosia Ng PA-C        glucose chewable tablet 24 g  24 g Oral PRN Gosia Ng  PA-WILLI        heparin (porcine) injection 5,000 Units  5,000 Units Subcutaneous Q8H Francisco Scruggs MD   5,000 Units at 07/15/24 1426    levETIRAcetam tablet 2,000 mg  2,000 mg Oral BID Love, Leti L, NP   2,000 mg at 07/15/24 0902    losartan tablet 50 mg  50 mg Oral Daily Love, Leti L, NP   50 mg at 07/15/24 0904    magnesium oxide tablet 800 mg  800 mg Oral PRN Love, Leti L, NP        magnesium oxide tablet 800 mg  800 mg Oral PRN Love, Leti L, NP        NIFEdipine 24 hr tablet 30 mg  30 mg Oral BID Love, Leti L, NP   30 mg at 07/15/24 0903    nitroGLYCERIN SL tablet 0.4 mg  0.4 mg Sublingual Q5 Min PRN Shayla Mancilla PA-C   0.4 mg at 07/15/24 1142    ondansetron injection 4 mg  4 mg Intravenous Q8H PRN Candelario Summers NP   4 mg at 07/12/24 0731    OXcarbazepine tablet 300 mg  300 mg Oral BID Love, Leti L, NP   300 mg at 07/15/24 0903    oxyCODONE immediate release tablet 5 mg  5 mg Oral Q4H PRN Walter Wilkins PA-C   5 mg at 07/15/24 1425    potassium bicarbonate disintegrating tablet 35 mEq  35 mEq Oral PRN Love, Leti L, NP        potassium bicarbonate disintegrating tablet 50 mEq  50 mEq Oral PRN Love, Leti L, NP        potassium bicarbonate disintegrating tablet 60 mEq  60 mEq Oral PRN Love, Leti L, NP        potassium, sodium phosphates 280-160-250 mg packet 2 packet  2 packet Oral PRN Love, Leti L, NP   2 packet at 07/12/24 1131    potassium, sodium phosphates 280-160-250 mg packet 2 packet  2 packet Oral PRN Love, Leti L, NP        potassium, sodium phosphates 280-160-250 mg packet 2 packet  2 packet Oral PRN Love, Leti L, NP        promethazine (PHENERGAN) 12.5 mg in 0.9% NaCl 50 mL IVPB  12.5 mg Intravenous Q6H PRN Candelario Summers NP        senna-docusate 8.6-50 mg per tablet 2 tablet  2 tablet Oral BID Shayla Mancilla PA-C   2 tablet at 07/15/24 0902    sodium chloride 0.9% flush 10 mL  10 mL Intravenous PRN Moises Bonds MD   10 mL at 07/14/24 0549    torsemide tablet 40 mg  40  mg Oral Daily Luly Singh MD   40 mg at 07/15/24 0902     Continuous Infusions:    Review of Systems  Objective:     Vital Signs (Most Recent):  Temp: 98.8 °F (37.1 °C) (07/15/24 1209)  Pulse: 102 (07/15/24 1429)  Resp: 18 (07/15/24 1425)  BP: 120/72 (07/15/24 1209)  SpO2: 99 % (07/15/24 1209) Vital Signs (24h Range):  Temp:  [98.2 °F (36.8 °C)-98.9 °F (37.2 °C)] 98.8 °F (37.1 °C)  Pulse:  [] 102  Resp:  [18-20] 18  SpO2:  [94 %-99 %] 99 %  BP: (107-130)/(58-72) 120/72     Weight: 75.8 kg (167 lb)  Body mass index is 25.39 kg/m².     Physical Exam  Constitutional:       General: She is not in acute distress.     Appearance: She is not diaphoretic.      Interventions: Nasal cannula in place.   HENT:      Head:      Comments: Incision C/D/I, staples intact  No erythema or drainage  Eyes:      General: No scleral icterus.        Right eye: No discharge.         Left eye: No discharge.      Conjunctiva/sclera: Conjunctivae normal.      Pupils: Pupils are equal, round, and reactive to light.   Cardiovascular:      Rate and Rhythm: Normal rate.   Pulmonary:      Effort: Pulmonary effort is normal. No respiratory distress.   Musculoskeletal:         General: No deformity or signs of injury.   Skin:     General: Skin is warm and dry.   Psychiatric:         Speech: Speech normal.         Behavior: Behavior is not agitated. Behavior is cooperative.            NEUROLOGICAL EXAMINATION:     MENTAL STATUS   Speech: speech is normal     CRANIAL NERVES     CN III, IV, VI   Pupils are equal, round, and reactive to light.       Awake, alert  ANDERS OU   Corneal intact OU   + spontaneous eye opening   Face symmetric   Tongue midline   Moves all extremities spontaneously    Exam findings to suggest seizures:  Myoclonus - no   eye twitching - no   Nystagmus - no   gaze deviation - no   waxy rigidity - no        Significant Labs: All pertinent lab results from the past 24 hours have been reviewed.    Significant  Studies: I have reviewed all pertinent imaging results/findings within the past 24 hours.  Assessment and Plan:     * Refractory epilepsy  Secondary seizure disorder  69F PMHx of HFrEF and right temporal intraparenchymal hemorrhage complicated by medically refractory epilepsy currently maintained on Clobazam 40 mg qhs, Levetiracetam 2g BID, and Oxcarbazepine 300 mg BID now managed by Dr. 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 s/p angiogram with kd embolization by IR on 7/9. Now s/p right frontotemporal craniotomy for resection of AVM by Dr. Bo and right temporal lobectomy by Dr. Lynda Patel on 7/10. Epilepsy following for assistance in management.    Recommendations:  - Recommend to continue outpatient AED regimen on discharge: Clobazam 40 mg qhs, Levetiracetam 2g BID, and Oxcarbazepine 300 mg BID   - Avoid agents that lower seizure threshold  - Postoperative management per NSGY  - Management of infectious/metabolic abnormalities per primary team  - Seizure precautions    Discussed plan of care with patient and SO at bedside. Will follow peripherally, please call with questions.    AVM (arteriovenous malformation) brain  - SM1 right temporal AVM s/p angiogram with kd embolization by IR on 7/9  - S/p right frontotemporal craniotomy for resection of AVM by Dr. Bo and right temporal lobectomy by Dr. Lynda Patel on 7/10  - MRI Brain WO with expected postop changes, 2 mm leftward MLS, thin extra-axial collection vs postop dural plasty material (felt more likely)  - Management per NSGY    History of spontaneous intraparenchymal intracranial hemorrhage  - Remote histroy of right temporal intraparenchymal hemorrhage complicated by medically refractory epilepsy   - Management per primary team    Congestive heart failure  Patient reported HFrEF  [facility] 8/2022: The left main is angiographically normal. The left anterior descending is angiographically normal. The circumflex is angiographically normal. The  right coronary artery is a dominant vessel of the heart and angiographically normal. Left Ventriculography a single PATRICK ventriculogram was performed revealing an EF of 55-60%.  - Repeat echo today with low normal to mildly reduced EF at 50%  - Management per primary team        VTE Risk Mitigation (From admission, onward)           Ordered     heparin (porcine) injection 5,000 Units  Every 8 hours         07/10/24 1453     IP VTE HIGH RISK PATIENT  Once         07/10/24 1453     Place sequential compression device  Until discontinued         07/10/24 1453                    Kirsten Dallas PA-C  Neurology-Epilepsy  [facility] - [facility]  Staff: Dr. Mead

## 2024-07-15 NOTE — NURSING
Nurses Note -- 4 Eyes      7/14/2024   8:06 PM      Skin assessed during: Q Shift Change      [] No Altered Skin Integrity Present    []Prevention Measures Documented      [x] Yes- Altered Skin Integrity Present or Discovered   [x] LDA Added if Not in Epic (Describe Wound)   [] New Altered Skin Integrity was Present on Admit and Documented in LDA   [] Wound Image Taken    Wound Care Consulted? No    Attending Nurse:  JAYLENE Blanco    Second RN/Staff Member:  JAYLA Arzola

## 2024-07-15 NOTE — PLAN OF CARE
Problem: Adult Inpatient Plan of Care  Goal: Plan of Care Review  Outcome: Progressing  Goal: Optimal Comfort and Wellbeing  Outcome: Progressing  Goal: Readiness for Transition of Care  Outcome: Progressing     Problem: Wound  Goal: Optimal Coping  Outcome: Progressing  Goal: Optimal Functional Ability  Outcome: Progressing  Goal: Improved Oral Intake  Outcome: Progressing  Goal: Optimal Pain Control and Function  Outcome: Progressing

## 2024-07-16 PROBLEM — F41.9 ANXIETY: Status: ACTIVE | Noted: 2024-07-16

## 2024-07-16 PROBLEM — D62 ACUTE POSTOPERATIVE ANEMIA DUE TO EXPECTED BLOOD LOSS: Status: ACTIVE | Noted: 2024-07-16

## 2024-07-16 PROBLEM — F32.A DEPRESSION: Status: ACTIVE | Noted: 2024-07-16

## 2024-07-16 PROBLEM — I50.22 CHRONIC SYSTOLIC CONGESTIVE HEART FAILURE: Status: ACTIVE | Noted: 2024-02-01

## 2024-07-16 PROBLEM — N17.9 AKI (ACUTE KIDNEY INJURY): Status: ACTIVE | Noted: 2024-07-16

## 2024-07-16 PROBLEM — G93.5 BRAIN COMPRESSION: Status: ACTIVE | Noted: 2024-07-16

## 2024-07-16 LAB
ALBUMIN SERPL BCP-MCNC: 3.4 G/DL (ref 3.5–5.2)
ALP SERPL-CCNC: 103 U/L (ref 55–135)
ALT SERPL W/O P-5'-P-CCNC: 29 U/L (ref 10–44)
ANION GAP SERPL CALC-SCNC: 12 MMOL/L (ref 8–16)
AST SERPL-CCNC: 30 U/L (ref 10–40)
BASOPHILS # BLD AUTO: 0.07 K/UL (ref 0–0.2)
BASOPHILS NFR BLD: 0.8 % (ref 0–1.9)
BILIRUB SERPL-MCNC: 0.3 MG/DL (ref 0.1–1)
BUN SERPL-MCNC: 22 MG/DL (ref 8–23)
CALCIUM SERPL-MCNC: 10.2 MG/DL (ref 8.7–10.5)
CHLORIDE SERPL-SCNC: 93 MMOL/L (ref 95–110)
CO2 SERPL-SCNC: 30 MMOL/L (ref 23–29)
CREAT SERPL-MCNC: 1.1 MG/DL (ref 0.5–1.4)
DIFFERENTIAL METHOD BLD: ABNORMAL
EOSINOPHIL # BLD AUTO: 0.1 K/UL (ref 0–0.5)
EOSINOPHIL NFR BLD: 1.7 % (ref 0–8)
ERYTHROCYTE [DISTWIDTH] IN BLOOD BY AUTOMATED COUNT: 12.7 % (ref 11.5–14.5)
EST. GFR  (NO RACE VARIABLE): 55.1 ML/MIN/1.73 M^2
GLUCOSE SERPL-MCNC: 102 MG/DL (ref 70–110)
HCT VFR BLD AUTO: 27.7 % (ref 37–48.5)
HGB BLD-MCNC: 8.9 G/DL (ref 12–16)
IMM GRANULOCYTES # BLD AUTO: 0.1 K/UL (ref 0–0.04)
IMM GRANULOCYTES NFR BLD AUTO: 1.2 % (ref 0–0.5)
LYMPHOCYTES # BLD AUTO: 2.6 K/UL (ref 1–4.8)
LYMPHOCYTES NFR BLD: 30.1 % (ref 18–48)
MAGNESIUM SERPL-MCNC: 1.7 MG/DL (ref 1.6–2.6)
MCH RBC QN AUTO: 29.1 PG (ref 27–31)
MCHC RBC AUTO-ENTMCNC: 32.1 G/DL (ref 32–36)
MCV RBC AUTO: 91 FL (ref 82–98)
MONOCYTES # BLD AUTO: 1.4 K/UL (ref 0.3–1)
MONOCYTES NFR BLD: 16.4 % (ref 4–15)
NEUTROPHILS # BLD AUTO: 4.2 K/UL (ref 1.8–7.7)
NEUTROPHILS NFR BLD: 49.8 % (ref 38–73)
NRBC BLD-RTO: 0 /100 WBC
PHOSPHATE SERPL-MCNC: 4.8 MG/DL (ref 2.7–4.5)
PLATELET # BLD AUTO: 289 K/UL (ref 150–450)
PMV BLD AUTO: 10.1 FL (ref 9.2–12.9)
POTASSIUM SERPL-SCNC: 3.7 MMOL/L (ref 3.5–5.1)
PROT SERPL-MCNC: 7.3 G/DL (ref 6–8.4)
RBC # BLD AUTO: 3.06 M/UL (ref 4–5.4)
SODIUM SERPL-SCNC: 135 MMOL/L (ref 136–145)
WBC # BLD AUTO: 8.46 K/UL (ref 3.9–12.7)

## 2024-07-16 PROCEDURE — 25000003 PHARM REV CODE 250: Performed by: PHYSICIAN ASSISTANT

## 2024-07-16 PROCEDURE — 63600175 PHARM REV CODE 636 W HCPCS

## 2024-07-16 PROCEDURE — 97116 GAIT TRAINING THERAPY: CPT

## 2024-07-16 PROCEDURE — 99900035 HC TECH TIME PER 15 MIN (STAT)

## 2024-07-16 PROCEDURE — 94668 MNPJ CHEST WALL SBSQ: CPT

## 2024-07-16 PROCEDURE — 25000003 PHARM REV CODE 250: Performed by: STUDENT IN AN ORGANIZED HEALTH CARE EDUCATION/TRAINING PROGRAM

## 2024-07-16 PROCEDURE — 94761 N-INVAS EAR/PLS OXIMETRY MLT: CPT

## 2024-07-16 PROCEDURE — 94640 AIRWAY INHALATION TREATMENT: CPT

## 2024-07-16 PROCEDURE — 25000003 PHARM REV CODE 250: Performed by: NURSE PRACTITIONER

## 2024-07-16 PROCEDURE — 84100 ASSAY OF PHOSPHORUS: CPT | Performed by: NURSE PRACTITIONER

## 2024-07-16 PROCEDURE — 25000003 PHARM REV CODE 250

## 2024-07-16 PROCEDURE — 97112 NEUROMUSCULAR REEDUCATION: CPT

## 2024-07-16 PROCEDURE — 25000242 PHARM REV CODE 250 ALT 637 W/ HCPCS: Performed by: PHYSICIAN ASSISTANT

## 2024-07-16 PROCEDURE — 27000221 HC OXYGEN, UP TO 24 HOURS

## 2024-07-16 PROCEDURE — 83735 ASSAY OF MAGNESIUM: CPT | Performed by: NURSE PRACTITIONER

## 2024-07-16 PROCEDURE — 85025 COMPLETE CBC W/AUTO DIFF WBC: CPT

## 2024-07-16 PROCEDURE — 97535 SELF CARE MNGMENT TRAINING: CPT

## 2024-07-16 PROCEDURE — 80053 COMPREHEN METABOLIC PANEL: CPT | Performed by: NURSE PRACTITIONER

## 2024-07-16 PROCEDURE — 97530 THERAPEUTIC ACTIVITIES: CPT

## 2024-07-16 PROCEDURE — 11000001 HC ACUTE MED/SURG PRIVATE ROOM

## 2024-07-16 PROCEDURE — 36415 COLL VENOUS BLD VENIPUNCTURE: CPT | Performed by: NURSE PRACTITIONER

## 2024-07-16 RX ORDER — ACETAMINOPHEN 500 MG
1000 TABLET ORAL EVERY 8 HOURS PRN
Qty: 45 TABLET | Refills: 0 | Status: SHIPPED | OUTPATIENT
Start: 2024-07-16 | End: 2024-07-31

## 2024-07-16 RX ORDER — NITROGLYCERIN 0.4 MG/1
0.4 TABLET SUBLINGUAL EVERY 5 MIN PRN
Qty: 25 TABLET | Refills: 0 | Status: SHIPPED | OUTPATIENT
Start: 2024-07-16

## 2024-07-16 RX ORDER — GABAPENTIN 300 MG/1
300 CAPSULE ORAL NIGHTLY
Qty: 30 CAPSULE | Refills: 0 | Status: SHIPPED | OUTPATIENT
Start: 2024-07-16 | End: 2024-08-16

## 2024-07-16 RX ORDER — OXYCODONE HYDROCHLORIDE 5 MG/1
5 TABLET ORAL EVERY 6 HOURS PRN
Qty: 20 TABLET | Refills: 0 | Status: SHIPPED | OUTPATIENT
Start: 2024-07-16 | End: 2024-07-22

## 2024-07-16 RX ADMIN — NITROGLYCERIN 0.4 MG: 0.4 TABLET, ORALLY DISINTEGRATING SUBLINGUAL at 04:07

## 2024-07-16 RX ADMIN — NITROGLYCERIN 0.4 MG: 0.4 TABLET, ORALLY DISINTEGRATING SUBLINGUAL at 08:07

## 2024-07-16 RX ADMIN — LOSARTAN POTASSIUM 50 MG: 50 TABLET, FILM COATED ORAL at 10:07

## 2024-07-16 RX ADMIN — OXYCODONE HYDROCHLORIDE 5 MG: 5 TABLET ORAL at 04:07

## 2024-07-16 RX ADMIN — NIFEDIPINE 30 MG: 30 TABLET, FILM COATED, EXTENDED RELEASE ORAL at 10:07

## 2024-07-16 RX ADMIN — OXYCODONE HYDROCHLORIDE 5 MG: 5 TABLET ORAL at 11:07

## 2024-07-16 RX ADMIN — SENNOSIDES AND DOCUSATE SODIUM 2 TABLET: 50; 8.6 TABLET ORAL at 08:07

## 2024-07-16 RX ADMIN — LEVETIRACETAM 2000 MG: 750 TABLET, FILM COATED ORAL at 10:07

## 2024-07-16 RX ADMIN — NITROGLYCERIN 0.4 MG: 0.4 TABLET, ORALLY DISINTEGRATING SUBLINGUAL at 02:07

## 2024-07-16 RX ADMIN — IPRATROPIUM BROMIDE AND ALBUTEROL SULFATE 3 ML: 2.5; .5 SOLUTION RESPIRATORY (INHALATION) at 06:07

## 2024-07-16 RX ADMIN — OXCARBAZEPINE 300 MG: 300 TABLET, FILM COATED ORAL at 10:07

## 2024-07-16 RX ADMIN — HEPARIN SODIUM 5000 UNITS: 5000 INJECTION INTRAVENOUS; SUBCUTANEOUS at 06:07

## 2024-07-16 RX ADMIN — NIFEDIPINE 30 MG: 30 TABLET, FILM COATED, EXTENDED RELEASE ORAL at 08:07

## 2024-07-16 RX ADMIN — CLOBAZAM 40 MG: 10 TABLET ORAL at 08:07

## 2024-07-16 RX ADMIN — TORSEMIDE 40 MG: 20 TABLET ORAL at 10:07

## 2024-07-16 RX ADMIN — HEPARIN SODIUM 5000 UNITS: 5000 INJECTION INTRAVENOUS; SUBCUTANEOUS at 09:07

## 2024-07-16 RX ADMIN — HEPARIN SODIUM 5000 UNITS: 5000 INJECTION INTRAVENOUS; SUBCUTANEOUS at 02:07

## 2024-07-16 RX ADMIN — OXYCODONE HYDROCHLORIDE 5 MG: 5 TABLET ORAL at 08:07

## 2024-07-16 RX ADMIN — OXCARBAZEPINE 300 MG: 300 TABLET, FILM COATED ORAL at 09:07

## 2024-07-16 RX ADMIN — IPRATROPIUM BROMIDE AND ALBUTEROL SULFATE 3 ML: 2.5; .5 SOLUTION RESPIRATORY (INHALATION) at 01:07

## 2024-07-16 RX ADMIN — SENNOSIDES AND DOCUSATE SODIUM 2 TABLET: 50; 8.6 TABLET ORAL at 10:07

## 2024-07-16 RX ADMIN — LEVETIRACETAM 2000 MG: 750 TABLET, FILM COATED ORAL at 08:07

## 2024-07-16 NOTE — PLAN OF CARE
Problem: Fall Injury Risk  Goal: Absence of Fall and Fall-Related Injury  Outcome: Progressing  Intervention: Promote Injury-Free Environment  Flowsheets (Taken 7/16/2024 1140)  Safety Promotion/Fall Prevention:   assistive device/personal item within reach   bed alarm set
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
## 2024-07-16 NOTE — CARE UPDATE
I have reviewed the chart of Haydee Linda who is hospitalized for the following:    Active Hospital Problems    Diagnosis    *Other chest pain    Brain compression     There is postoperative gas and fluid underlying the craniotomy with additional pneumocephalus overlying the frontal lobes     Slight mass effect and approximately 0.3-0.4 cm of leftward midline shift.       SHERI (acute kidney injury)     Slight bump in creatinine post op  Monitor with daily cmp  Avoid nephrotoxic agents      Anxiety    Depression    Acute postoperative anemia due to expected blood loss     Ebl 100cc  Monitor with daily cbc  Drop in hemoglobin post op  Received 500cc bolus in ICU      AVM (arteriovenous malformation) brain    Hyponatremia    Primary hypertension    Constipation    Atrial flutter    Refractory epilepsy    History of spontaneous intraparenchymal intracranial hemorrhage    Secondary seizure disorder    Chronic systolic congestive heart failure        Rosangela Musa NP  Unit Based [facility]

## 2024-07-16 NOTE — PT/OT/SLP PROGRESS
"Occupational Therapy   Co-Treatment    Name: Haydee Linda  MRN: [id number]  Admitting Diagnosis:  Other chest pain  7 Days Post-Op    Recommendations:     Discharge Recommendations: High Intensity Therapy  Discharge Equipment Recommendations:  bedside commode, walker, rolling  Barriers to discharge:  None    Assessment:     Haydee Linda is a 69 y.o. female with a medical diagnosis of Other chest pain. Performance deficits affecting function are weakness, impaired endurance, impaired functional mobility, impaired balance, gait instability, decreased lower extremity function, decreased upper extremity function, impaired coordination. Pt agreeable to therapy and tolerated well. Pt continues to present with safety deficits and requires moderate verbal cues to maintain safety in RW. Verbal cues to redirect pt to task 2/2 pt perseverating on discussing primary medical hx. Pt with LLE buckling as pt fatigued.Pt remains limited in ADLs, functional mobility, and functional transfers. Patient has demonstrated sufficient progression to warrant high intensity therapy evidenced by objectives noted below.    Co-treat performed due to acuity and complexity of pt's medical status with the expectation of 2 skilled disciplines needed to optimize pts occupational performance and  decreased activity tolerance     Rehab Prognosis:  Good; patient would benefit from acute skilled OT services to address these deficits and reach maximum level of function.       Plan:     Patient to be seen 4 x/week to address the above listed problems via self-care/home management, therapeutic activities, therapeutic exercises, neuromuscular re-education  Plan of Care Expires: 08/11/24  Plan of Care Reviewed with: patient, spouse    Subjective     Chief Complaint: headache  Patient/Family Comments/goals: "My  does my business now."  Pain/Comfort:  Location - Side 1: Right  Location 1: head  Pain Addressed 1: Reposition, Distraction, " Pre-medicate for activity  Pain Rating Post-Intervention 1: 8/10    Objective:     Communicated with: RN prior to session.  Patient found HOB elevated with blood pressure cuff, telemetry, PureWick, pulse ox (continuous), SCD upon OT entry to room.    General Precautions: Standard, fall    Orthopedic Precautions:N/A  Braces: N/A  Respiratory Status: Room air Pt  tolerated therapy well on room air, sats 94% sitting on toilet and 100% at the end of session      Occupational Performance:     Bed Mobility:    Patient completed Scooting/Bridging with contact guard assistance  Patient completed Supine to Sit with contact guard assistance  Patient completed Sit to Supine with minimum assistance at LLE    Functional Mobility/Transfers:  Patient completed Sit <> Stand Transfer with RW   From EOB Min A x1  From low toilet in bathroom Mod A x2 w/ RW  Chair> Bed stand pivot to L with no AD HHA Mod A   Toilet Transfer step transfer Min A w/ RW   Functional Mobility: Pt engaged in functional mobility throughout hospital room and bathroom 15ft x 2 with RW  and  Min A  to maximize functional endurance and standing balance required for home/community mobility and occupational engagement.     Activities of Daily Living:  Grooming: contact guard assistance oral hygiene performed standing at sink in RW. Cues to maintain safety in RW 2/2 pt using sink to maintain balance   Lower Body Dressing: contact guard assistance donned  socks sitting  EOB. Pt with posterior lean but able to maryuri without physical assistance   Toileting: supervision sat on standard toilet and urinated      AMPAC 6 Click ADL: 20    Treatment & Education:  -Education on proper use of BSC upon d/c home  -Education on energy conservation and task modification to maximize safety and (I) during ADLs and mobility  -Education on importance of OOB activity to improve overall activity tolerance and promote recovery  -Pt educated to call for assistance and to transfer with  hospital staff only  -Provided education regarding role of OT, POC, & discharge recommendations with pt and pt's  verbalizing understanding.  Pt had no further questions & when asked whether there were any concerns pt reported none.      Patient left HOB elevated with all lines intact, call button in reach, RN notified, and  present    GOALS:   Multidisciplinary Problems       Occupational Therapy Goals          Problem: Occupational Therapy    Goal Priority Disciplines Outcome Interventions   Occupational Therapy Goal     OT, PT/OT Progressing    Description: Goals to be met by: 8/11/2024     Patient will increase functional independence with ADLs by performing:    UE Dressing with Minimal Assistance.  LE Dressing with Set-up Assistance.  Grooming while standing at sink with Supervision.  Toileting from toilet with Supervision for hygiene and clothing management.   Step transfer with Contact Guard Assistance  Toilet transfer to toilet with Supervision.                         Time Tracking:     OT Date of Treatment: 07/16/24  OT Start Time: 0938  OT Stop Time: 1023  OT Total Time (min): 45 min    Billable Minutes:Self Care/Home Management 25 min  Therapeutic Activity 20 min    OT/SEAMUS: OT          7/16/2024

## 2024-07-16 NOTE — PT/OT/SLP PROGRESS
Physical Therapy  Co-Treatment with OT    Haydee Linda   [id number]    Time Tracking:     PT Received On: 07/16/24   PT Start Time: 0937   PT Stop Time: 1022   PT Total Time (min): 45 min    Billable Minutes: Gait Training 10 and Neuromuscular Re-education 35 minutes       *This session was completed as a co-treatment with OT secondary to decreased functional mobility.   Recommendations:     Therapy Intensity Recommendations at Discharge: High Intensity Therapy     Equipment Needed After Discharge: bedside commode, walker, rolling    Barriers to Discharge: None    Patient Information:     Recent Surgery: * No procedures listed * 7 Days Post-Op    Diagnosis: Other chest pain    Length of Stay: 7 days    General Precautions: Standard, fall  Orthopedic Precautions: N/A  Brace: N/A    Assessment:     Haydee Linda tolerated treatment fair today. Haydee was sleeping at the beginning of therapy but agreeable and calm upon once awake. Throughout today's session, Haydee needed frequent redirection to refocus on task due to tangential speech. Haydee needed assistance to transition in/out of bed due to L LE weakness and decreased coordination. Pt performed 2 STSs today requiring min A from EOB and mod A x 2 from toilet. Pt ambulated 12 ft to restroom w/ CGA using RW with noted decreased gait speed, decreased step length, decreased L heel strike, and forward flex posture. Pt stood at bathroom sink for 10-15 minutes for self care with CGA needing verbal cueing to rely on RW for stability instead of bathroom sink. Pt needed to be rolled to bed due to increasing fatigue, but vitals reported 100% O2 and 75 bpm. Pt required Mod A for stand pivot from bedside chair to bed. Communicated with PA that high intensity recommendations are still appropriate for pt at this time.  Discussed PT role, POC, and goals with patient and/or family; verbalized understanding. Haydee Linda will continue to benefit from acute  "PT services to promote mobility during this admission and improve return to PLOF.    Problem List: weakness, impaired endurance, impaired self care skills, impaired functional mobility, gait instability, impaired balance, decreased lower extremity function, decreased upper extremity function, impaired coordination, decreased safety awareness, pain    Rehab Prognosis: Good; patient would benefit from acute skilled PT services to address these deficits and reach maximum level of function.    Plan:     Patient to be seen 4 x/week to address the above listed problems via gait training, therapeutic activities, therapeutic exercises, neuromuscular re-education    Plan of Care Expires: 08/11/24  Plan of Care reviewed with: patient, spouse    Subjective:     Communicated with RN prior to treatment, appropriate to see for treatment.    Pt found supine in bed (HOB elevated) upon PT entry to room, agreeable to treatment.    Patient commenting: "My L leg is so weak!"    Does this patient have any cultural, spiritual, Lutheran conflicts given the current situation? Patient/family has no barriers to learning. Patient/family verbalizes understanding of his/her program and goals and demonstrates them correctly. No cultural, spiritual, or educational needs identified.    Objective:     Patient found with: pulse ox (continuous), telemetry, peripheral IV, PureWick, oxygen, blood pressure cuff    Pain:  Pain Rating 1: 10/10  Location - Side 1: Right  Location - Orientation 1: generalized  Location 1: head  Pain Addressed 1: Reposition, Distraction  Pain Rating Post-Intervention 1: 10/10    Functional Mobility:    Bed Mobility:  Supine to Sitting: Contact-Guard Assist  Sitting to Supine: Min Assist  Scooting towards EOB in sitting: Contact-Guard Assist  Scooting towards HOB in supine: Stand-By Assist    Transfers:  Sit to Stand: Min Assist from EOB with Rolling walker x 1 trial(s)  Chair to Bed: Mod A from chair to bed with no AD via " stand pivot x 1 trial(s)  Toilet Transfer: Mod Assist x 2 to transition on/off toilet/commode with Rolling walker x 1 trial(s)    Gait:  12 feet to restroom   decreased gait speed, decreased step length, decreased L heel strike, and forward flex posture.   Assist level: Contact-Guard Assist  Device: Rolling walker    Balance:  Static Sit: Stand-By Assist at EOB    Static Stand: Contact-Guard Assist with Rolling walker  10-15 minutes at bathroom sink   needing verbal cueing to rely on RW for stability instead of bathroom sink    Walk Test   O2 sat: 94%, HR: 92 bpm  O2 sat: 100%, HR: 75 bpm      AM-PAC 6 CLICK MOBILITY  Turning over in bed (including adjusting bedclothes, sheets and blankets)?: 3  Sitting down on and standing up from a chair with arms (e.g., wheelchair, bedside commode, etc.): 2  Moving from lying on back to sitting on the side of the bed?: 3  Moving to and from a bed to a chair (including a wheelchair)?: 2  Need to walk in hospital room?: 3  Climbing 3-5 steps with a railing?: 2  Basic Mobility Total Score: 15    Patient was left supine in bed (HOB elevated) with all lines intact, call button in reach, and spouse present.    GOALS:   Multidisciplinary Problems       Physical Therapy Goals          Problem: Physical Therapy    Goal Priority Disciplines Outcome Goal Variances Interventions   Physical Therapy Goal     PT, PT/OT Progressing     Description: Goals to be met by: 24    Patient will increase functional independence with mobility by performin. Supine to sit with [place]  2. Sit to supine with [place]  3. Sit to stand transfer with Supervision with or without LRAD  4. Gait  x 20 feet with Contact Guard Assistance with or without LRAD. MET   5. Ascend/descend 2 stair with Minimal Assistance with or without LRAD   6. Sitting at edge of bed 10 minutes with [place]  7. Stand for 3 minutes with Modified [place] with or without LRAD.   8. Lower extremity exercise  program x20 reps per handout, with assistance as needed                         Юлия Montoya, SPT  7/16/2024

## 2024-07-16 NOTE — NURSING
Nurses Note -- 4 Eyes      7/15/2024   8:20 PM      Skin assessed during: Q Shift Change      [] No Altered Skin Integrity Present    []Prevention Measures Documented      [x] Yes- Altered Skin Integrity Present or Discovered   [x] LDA Added if Not in Epic (Describe Wound)   [] New Altered Skin Integrity was Present on Admit and Documented in LDA   [] Wound Image Taken    Wound Care Consulted? No    Attending Nurse:  Bella Ortiz RN/Staff Member:  JAYLENE Ward

## 2024-07-16 NOTE — DISCHARGE INSTRUCTIONS
Wound care:    Keep incisions dry. Do not soak under water (bathtub, swimming pool, etc.). Please shower with baby shampoo, but do not take a bath. If the incision becomes wet, gently pat it dry with a clean towel; do not rub.    Other post-op instructions:    No lifting anything heavier than a gallon of milk until cleared in post-operative visit.    No driving until cleared by your epilepsy neurologist.

## 2024-07-16 NOTE — PLAN OF CARE
"Met with patient &  to review discharge recommendation of home health and both are agreeable to plan    Patient/family provided list of facilities in-network with patient's payor plan. Providers that are owned, operated, or affiliated with [facility] are included on the list.     Patient/family instructed to identify preference.    Preferred Facility: [facility]    If an additional preferred facility not listed above is identified, additional referral to be sent. If above facilities unable to accept, will send additional referrals to in-network providers.      Referral forwarded via [facility] - spoke with Teresa - patient accepted - they will contact patient to schedule home visits beginning 7/18    [facility] delivered rolling walker to bedside -  declined bedside commode stating "I have someone at home I can get one from"       07/16/24 1633   Post-Acute Status   Post-Acute Authorization HME;Home Health   HME Status Set-up Complete/Auth obtained   Home Health Status Set-up Complete/Auth obtained   Discharge Delays None known at this time   Discharge Plan   Discharge Plan A Home Health       "

## 2024-07-17 VITALS
OXYGEN SATURATION: 97 % | BODY MASS INDEX: 25.31 KG/M2 | HEIGHT: 68 IN | HEART RATE: 86 BPM | RESPIRATION RATE: 18 BRPM | TEMPERATURE: 98 F | SYSTOLIC BLOOD PRESSURE: 104 MMHG | WEIGHT: 167 LBS | DIASTOLIC BLOOD PRESSURE: 63 MMHG

## 2024-07-17 LAB
ALBUMIN SERPL BCP-MCNC: 3.5 G/DL (ref 3.5–5.2)
ALP SERPL-CCNC: 110 U/L (ref 55–135)
ALT SERPL W/O P-5'-P-CCNC: 33 U/L (ref 10–44)
ANION GAP SERPL CALC-SCNC: 13 MMOL/L (ref 8–16)
AST SERPL-CCNC: 29 U/L (ref 10–40)
BASOPHILS # BLD AUTO: 0.04 K/UL (ref 0–0.2)
BASOPHILS NFR BLD: 0.5 % (ref 0–1.9)
BILIRUB SERPL-MCNC: 0.3 MG/DL (ref 0.1–1)
BUN SERPL-MCNC: 26 MG/DL (ref 8–23)
CALCIUM SERPL-MCNC: 10.6 MG/DL (ref 8.7–10.5)
CHLORIDE SERPL-SCNC: 90 MMOL/L (ref 95–110)
CO2 SERPL-SCNC: 31 MMOL/L (ref 23–29)
CREAT SERPL-MCNC: 1.1 MG/DL (ref 0.5–1.4)
DIFFERENTIAL METHOD BLD: ABNORMAL
EOSINOPHIL # BLD AUTO: 0.1 K/UL (ref 0–0.5)
EOSINOPHIL NFR BLD: 1 % (ref 0–8)
ERYTHROCYTE [DISTWIDTH] IN BLOOD BY AUTOMATED COUNT: 12.8 % (ref 11.5–14.5)
EST. GFR  (NO RACE VARIABLE): 55.1 ML/MIN/1.73 M^2
GLUCOSE SERPL-MCNC: 111 MG/DL (ref 70–110)
HCT VFR BLD AUTO: 27.7 % (ref 37–48.5)
HGB BLD-MCNC: 8.6 G/DL (ref 12–16)
IMM GRANULOCYTES # BLD AUTO: 0.1 K/UL (ref 0–0.04)
IMM GRANULOCYTES NFR BLD AUTO: 1.2 % (ref 0–0.5)
LYMPHOCYTES # BLD AUTO: 2.2 K/UL (ref 1–4.8)
LYMPHOCYTES NFR BLD: 25.9 % (ref 18–48)
MAGNESIUM SERPL-MCNC: 1.9 MG/DL (ref 1.6–2.6)
MCH RBC QN AUTO: 28.7 PG (ref 27–31)
MCHC RBC AUTO-ENTMCNC: 31 G/DL (ref 32–36)
MCV RBC AUTO: 92 FL (ref 82–98)
MONOCYTES # BLD AUTO: 1 K/UL (ref 0.3–1)
MONOCYTES NFR BLD: 11.1 % (ref 4–15)
NEUTROPHILS # BLD AUTO: 5.2 K/UL (ref 1.8–7.7)
NEUTROPHILS NFR BLD: 60.3 % (ref 38–73)
NRBC BLD-RTO: 0 /100 WBC
PHOSPHATE SERPL-MCNC: 4.1 MG/DL (ref 2.7–4.5)
PLATELET # BLD AUTO: 390 K/UL (ref 150–450)
PMV BLD AUTO: 9.6 FL (ref 9.2–12.9)
POTASSIUM SERPL-SCNC: 4.1 MMOL/L (ref 3.5–5.1)
PROT SERPL-MCNC: 7.4 G/DL (ref 6–8.4)
RBC # BLD AUTO: 3 M/UL (ref 4–5.4)
SODIUM SERPL-SCNC: 134 MMOL/L (ref 136–145)
WBC # BLD AUTO: 8.62 K/UL (ref 3.9–12.7)

## 2024-07-17 PROCEDURE — 84100 ASSAY OF PHOSPHORUS: CPT | Performed by: NURSE PRACTITIONER

## 2024-07-17 PROCEDURE — 94761 N-INVAS EAR/PLS OXIMETRY MLT: CPT

## 2024-07-17 PROCEDURE — 25000003 PHARM REV CODE 250: Performed by: PHYSICIAN ASSISTANT

## 2024-07-17 PROCEDURE — 63600175 PHARM REV CODE 636 W HCPCS

## 2024-07-17 PROCEDURE — 25000242 PHARM REV CODE 250 ALT 637 W/ HCPCS: Performed by: PHYSICIAN ASSISTANT

## 2024-07-17 PROCEDURE — 94640 AIRWAY INHALATION TREATMENT: CPT

## 2024-07-17 PROCEDURE — 80053 COMPREHEN METABOLIC PANEL: CPT | Performed by: NURSE PRACTITIONER

## 2024-07-17 PROCEDURE — 25000003 PHARM REV CODE 250

## 2024-07-17 PROCEDURE — 94668 MNPJ CHEST WALL SBSQ: CPT

## 2024-07-17 PROCEDURE — 36415 COLL VENOUS BLD VENIPUNCTURE: CPT | Performed by: NURSE PRACTITIONER

## 2024-07-17 PROCEDURE — 25000003 PHARM REV CODE 250: Performed by: NURSE PRACTITIONER

## 2024-07-17 PROCEDURE — 83735 ASSAY OF MAGNESIUM: CPT | Performed by: NURSE PRACTITIONER

## 2024-07-17 PROCEDURE — 85025 COMPLETE CBC W/AUTO DIFF WBC: CPT

## 2024-07-17 RX ORDER — SODIUM CHLORIDE 1 G/1
1000 TABLET ORAL DAILY
Status: DISCONTINUED | OUTPATIENT
Start: 2024-07-17 | End: 2024-07-17 | Stop reason: HOSPADM

## 2024-07-17 RX ORDER — SODIUM CHLORIDE 1 G/1
1000 TABLET ORAL DAILY
Qty: 5 TABLET | Refills: 0 | Status: SHIPPED | OUTPATIENT
Start: 2024-07-17 | End: 2024-07-22

## 2024-07-17 RX ADMIN — OXCARBAZEPINE 300 MG: 300 TABLET, FILM COATED ORAL at 08:07

## 2024-07-17 RX ADMIN — NITROGLYCERIN 0.4 MG: 0.4 TABLET, ORALLY DISINTEGRATING SUBLINGUAL at 08:07

## 2024-07-17 RX ADMIN — SODIUM CHLORIDE 1000 MG: 1 TABLET ORAL at 11:07

## 2024-07-17 RX ADMIN — NITROGLYCERIN 0.4 MG: 0.4 TABLET, ORALLY DISINTEGRATING SUBLINGUAL at 04:07

## 2024-07-17 RX ADMIN — OXYCODONE HYDROCHLORIDE 5 MG: 5 TABLET ORAL at 01:07

## 2024-07-17 RX ADMIN — HEPARIN SODIUM 5000 UNITS: 5000 INJECTION INTRAVENOUS; SUBCUTANEOUS at 05:07

## 2024-07-17 RX ADMIN — IPRATROPIUM BROMIDE AND ALBUTEROL SULFATE 3 ML: 2.5; .5 SOLUTION RESPIRATORY (INHALATION) at 12:07

## 2024-07-17 RX ADMIN — LEVETIRACETAM 2000 MG: 750 TABLET, FILM COATED ORAL at 08:07

## 2024-07-17 RX ADMIN — NIFEDIPINE 30 MG: 30 TABLET, FILM COATED, EXTENDED RELEASE ORAL at 08:07

## 2024-07-17 RX ADMIN — IPRATROPIUM BROMIDE AND ALBUTEROL SULFATE 3 ML: 2.5; .5 SOLUTION RESPIRATORY (INHALATION) at 07:07

## 2024-07-17 RX ADMIN — OXYCODONE HYDROCHLORIDE 5 MG: 5 TABLET ORAL at 05:07

## 2024-07-17 RX ADMIN — OXYCODONE HYDROCHLORIDE 5 MG: 5 TABLET ORAL at 11:07

## 2024-07-17 RX ADMIN — NITROGLYCERIN 0.4 MG: 0.4 TABLET, ORALLY DISINTEGRATING SUBLINGUAL at 01:07

## 2024-07-17 NOTE — PLAN OF CARE
Problem: Fall Injury Risk  Goal: Absence of Fall and Fall-Related Injury  Outcome: Progressing  Intervention: Promote Injury-Free Environment  Flowsheets (Taken 7/17/2024 9935)  Safety Promotion/Fall Prevention:   assistive device/personal item within reach   bed alarm set

## 2024-07-17 NOTE — PLAN OF CARE
David Nicole - Neurosurgery (Hospital)  Discharge Final Note    Primary Care Provider: No, Primary Doctor    Expected Discharge Date: 7/17/2024    Final Discharge Note (most recent)       Final Note - 07/17/24 1453          Final Note    Assessment Type Final Discharge Note     Anticipated Discharge Disposition Home-Health Care [facility]     What phone number can be called within the next 1-3 days to see how you are doing after discharge? [phone]     Hospital Resources/Appts/Education Provided Provided patient/caregiver with written discharge plan information        Post-Acute Status    Post-Acute Authorization Home Health;HME     HME Status Set-up Complete/Auth obtained     Home Health Status Set-up Complete/Auth obtained     Discharge Delays None known at this time                     Important Message from Medicare  Important Message from Medicare regarding Discharge Appeal Rights: Given to patient/caregiver, Explained to patient/caregiver, Signed/date by patient/caregiver     Date IMM was signed: 07/17/24  Time IMM was signed: 1044    Contact [facility]   Specialty: Home Health Services, Home Therapy Services, Home Living Aide Services    [address] [place] [address]   Phone: [phone]       Next Steps: Follow up    Instructions: they will contact you to schedule home visits    [facility]   Specialty: DME Provider, Orthotics    [address] [facility] [address]   Phone: [phone]       Next Steps: Follow up    Instructions: contact for any issues with rolling walker              Patient medically ready for discharge to home with home health services. Family/patient aware of discharge.    Future Appointments   Date Time Provider Department Center   7/24/2024  9:30 AM Salma Chong RN [facility] NEUROS8 David Jaime LMSW   - [facility]

## 2024-07-18 NOTE — OP NOTE
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 Bo)   Right temporal lobectomy   Use of microscopic technique   Use of neuronavigation     Indications:   Haydee Linda is a 68 y.o. female who presents as a referral from Dr. Gamez for medication refractory epilepsy. 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      The patient has been discussed at length in interdisciplinary conference and recommended for skip procedure (right anterior temporal lobectomy), particularly in light of the AVM.  She was again discussed with Dr. Mead, who agrees with the management plan.        Risks include, but are not limited to, bleeding, pain, infection, scarring, need for further/repeat procedure, death, paralysis, stroke/damage to major blood vessels, leak of cerebrospinal fluid, spatial memory, speech changes, and hardware-related complications. Seizure freedom cannot be guaranteed. Informed consent was obtained of the patient with her  present.        Operative Note: The patient was brought back to the operating room, and general endotracheal anesthesia was induced. An arterial line was placed, and  instructions were given to keep SBP <140 throughout the case. She was carefully positioned supine with a large bump under her right shoulder to allow the right mastoid tip to be the highest point in the field. Her head was affixed to the table with a Pineda 3-point head clamp. She was registered to the neuronavigation system with appropriate accuracy.      A modified reverse-question-rose mary incision was designed from the root of the zygoma to several centimeters above the keyhole, posterior to the hairline. A time out was performed. The incision was injected with 10 cc of 1% lidocaine with epinephrine. The patient received 2 g ceftriaxone, 12 of dexamethasone, and two grams of Keppra. Incision was made with a 15 blade, and dissection was carried down with Bovie cautery. Bipolar at 20 was used for hemostasis where indicated. A myocutaneous flap was elevated. The root of the zygoma was again identified.      We designed bur holes just above the root of the zygoma and also on the zygoamtic process of the frontal bone, plus one posteriorly. These were connected with the craniotome and the M8 where needed over the sphenoid ridge. The bone flap was elevated with the Penfield #3. The sphenoid ridge was drilled down with the M8 and liberally waxed. We also performed a subtemporal decompression. Tack-up stitches were placed.      We identified the superficial draining, arterialized vein. We brought in the microscope and used microdissection techniques to partially split the fissure, we identified the feeding vessels and nidus. We dissected around the nidus and saw which vessels were already embolized. We placed clips on feeders and isolated nidus before finally clipping and cutting the feeders and draining vein (last). We obtained hemostasis and then Dr. Patel came in for temporal lobectomy. See her part of OP note.    EBL:100 cc  Specimen Sent: AVM nidus

## 2024-07-18 NOTE — PHYSICIAN QUERY
Question: Please specify diagnosis or diagnoses associated with the following clinical findings in the query.    Provider Query Response:  Non-Traumatic Brain Compression

## 2024-07-22 LAB
FINAL PATHOLOGIC DIAGNOSIS: NORMAL
GROSS: NORMAL
Lab: NORMAL
MICROSCOPIC EXAM: NORMAL

## 2024-07-24 ENCOUNTER — TELEPHONE (OUTPATIENT)
Dept: NEUROSURGERY | Facility: CLINIC | Age: 69
End: 2024-07-24

## 2024-07-24 NOTE — TELEPHONE ENCOUNTER
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

## 2024-07-25 ENCOUNTER — PATIENT MESSAGE (OUTPATIENT)
Dept: NEUROLOGY | Facility: CLINIC | Age: 69
End: 2024-07-25
Payer: MEDICARE

## 2024-09-27 ENCOUNTER — TELEPHONE (OUTPATIENT)
Dept: NEUROSURGERY | Facility: CLINIC | Age: 69
End: 2024-09-27
Payer: MEDICARE

## 2024-09-27 NOTE — TELEPHONE ENCOUNTER
Called [facility] lvm to schedule po appt. Spoke w pt's  and scheduled appt for 10/31 at 1pm. Preferred a time in the afternoon since they are driving 3-4 hours. Declined offer for virtual visit bc preferred to see Dr. Patel in-person.

## 2024-10-21 PROBLEM — N17.9 AKI (ACUTE KIDNEY INJURY): Status: RESOLVED | Noted: 2024-07-16 | Resolved: 2024-10-21

## 2024-10-30 ENCOUNTER — TELEPHONE (OUTPATIENT)
Dept: NEUROSURGERY | Facility: CLINIC | Age: 69
End: 2024-10-30
Payer: MEDICARE

## 2024-10-31 ENCOUNTER — OFFICE VISIT (OUTPATIENT)
Dept: NEUROSURGERY | Facility: CLINIC | Age: 69
End: 2024-10-31
Payer: MEDICARE

## 2024-10-31 VITALS — HEART RATE: 88 BPM | DIASTOLIC BLOOD PRESSURE: 78 MMHG | SYSTOLIC BLOOD PRESSURE: 126 MMHG

## 2024-10-31 DIAGNOSIS — G40.909 SECONDARY SEIZURE DISORDER: Primary | ICD-10-CM

## 2024-10-31 DIAGNOSIS — G40.219 LOCALIZATION-RELATED (FOCAL) (PARTIAL) SYMPTOMATIC EPILEPSY AND EPILEPTIC SYNDROMES WITH COMPLEX PARTIAL SEIZURES, INTRACTABLE, WITHOUT STATUS EPILEPTICUS: ICD-10-CM

## 2024-10-31 DIAGNOSIS — Q28.2 AVM (ARTERIOVENOUS MALFORMATION) BRAIN: ICD-10-CM

## 2024-10-31 PROCEDURE — 99214 OFFICE O/P EST MOD 30 MIN: CPT | Mod: S$PBB,,, | Performed by: NEUROLOGICAL SURGERY

## 2024-10-31 PROCEDURE — 99213 OFFICE O/P EST LOW 20 MIN: CPT | Mod: PBBFAC | Performed by: NEUROLOGICAL SURGERY

## 2024-10-31 PROCEDURE — 99999 PR PBB SHADOW E&M-EST. PATIENT-LVL III: CPT | Mod: PBBFAC,,, | Performed by: NEUROLOGICAL SURGERY

## 2024-10-31 RX ORDER — PANTOPRAZOLE SODIUM 40 MG/1
1 TABLET, DELAYED RELEASE ORAL EVERY MORNING
COMMUNITY

## 2024-10-31 RX ORDER — RANOLAZINE 500 MG/1
500 TABLET, EXTENDED RELEASE ORAL
COMMUNITY

## 2024-10-31 RX ORDER — ACETAMINOPHEN 500 MG
500 TABLET ORAL EVERY 6 HOURS PRN
COMMUNITY

## 2024-10-31 RX ORDER — SUCRALFATE 1 G/1
1 TABLET ORAL 4 TIMES DAILY
COMMUNITY

## 2024-10-31 RX ORDER — ISOSORBIDE MONONITRATE 30 MG/1
1 TABLET, EXTENDED RELEASE ORAL EVERY MORNING
COMMUNITY
Start: 2024-07-28 | End: 2025-07-28
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
## 2025-01-29 ENCOUNTER — TELEPHONE (OUTPATIENT)
Dept: NEUROSURGERY | Facility: CLINIC | Age: 70
End: 2025-01-29
Payer: MEDICARE

## 2025-01-29 NOTE — TELEPHONE ENCOUNTER
Pt wants to know if Dr. Patel can manage her meds / refer her to a neurologist here at [facility] to manage her meds, will consult Dr Patel and call her back    ----- Message from Sharyn sent at 1/29/2025 11:59 AM CST -----  Regarding: Rx Advise  Contact: [phone]  DEMARIO ALEXANDRA calling regarding Patient Advice (message) for # pt is calling to speak with nurse regarding medication that her neurologist that is closest to her does not want to order meds or to take her back as a pt since he referred her to [facility] and wants to ask if provider can continue to medication when needed pls advise       evETIRAcetam (KEPPRA) 1000 MG tablet  cloBAZam (ONFI) 20 mg Tab  OXcarbazepine (TRILEPTAL) 300 MG Tab

## 2025-01-31 ENCOUNTER — TELEPHONE (OUTPATIENT)
Dept: NEUROLOGY | Facility: CLINIC | Age: 70
End: 2025-01-31
Payer: MEDICARE

## 2025-01-31 NOTE — TELEPHONE ENCOUNTER
----- Message from Med Assistant Rai sent at 1/30/2025  3:29 PM CST -----  Regarding: Neurology Follow-up  Good afternoon,     Could yall reach out to this patient to help coordinate establishing care with an epileptologist at [facility] to manage her medications? She was an old Dr. Gamez pt and had surgery in July with Dr. Patel. She has not had any seizures since surgery and is happy to be managed here for her seizure meds. Thanks!    Rai Guerrero MA [facility]   Department of Neurosurgery

## 2025-02-06 ENCOUNTER — OFFICE VISIT (OUTPATIENT)
Dept: NEUROLOGY | Facility: CLINIC | Age: 70
End: 2025-02-06
Payer: MEDICARE

## 2025-02-06 DIAGNOSIS — F43.23 ADJUSTMENT DISORDER WITH MIXED ANXIETY AND DEPRESSED MOOD: Primary | ICD-10-CM

## 2025-02-06 DIAGNOSIS — R07.89 OTHER CHEST PAIN: ICD-10-CM

## 2025-02-06 DIAGNOSIS — Z98.890 HISTORY OF BRAIN SURGERY: ICD-10-CM

## 2025-02-06 DIAGNOSIS — Z86.79 HISTORY OF SPONTANEOUS INTRAPARENCHYMAL INTRACRANIAL HEMORRHAGE: ICD-10-CM

## 2025-02-06 DIAGNOSIS — G31.84 MILD NEUROCOGNITIVE DISORDER: ICD-10-CM

## 2025-02-06 DIAGNOSIS — I50.22 CHRONIC SYSTOLIC CONGESTIVE HEART FAILURE: ICD-10-CM

## 2025-02-06 DIAGNOSIS — G40.909 SECONDARY SEIZURE DISORDER: ICD-10-CM

## 2025-02-06 PROCEDURE — 99211 OFF/OP EST MAY X REQ PHY/QHP: CPT | Mod: PBBFAC | Performed by: STUDENT IN AN ORGANIZED HEALTH CARE EDUCATION/TRAINING PROGRAM

## 2025-02-06 PROCEDURE — 99211 OFF/OP EST MAY X REQ PHY/QHP: CPT | Mod: PBBFAC,27

## 2025-02-06 RX ORDER — METOPROLOL SUCCINATE 25 MG/1
25 TABLET, EXTENDED RELEASE ORAL
COMMUNITY
Start: 2025-01-29

## 2025-02-06 RX ORDER — LIDOCAINE 50 MG/G
PATCH TOPICAL
COMMUNITY
Start: 2024-12-18

## 2025-02-06 NOTE — PROGRESS NOTES
NEUROPSYCHOLOGY CONSULT    Referral Information  NAME:  Haydee Linda DATE OF SERVICE: 2025   MRN#:  [id number] EDUCATION: 18   AGE: 69 y.o. HANDEDNESS: Right    : 1955 RACE: White   SEX: Female REFERRAL: Lynda Patel MD;  Neurology, [facility]     Evaluation methods: I had the pleasure of seeing Haydee Linda on 2025 in person at the [facility], Department of Neurology. Data sources for the below report include review of the available medical record, an interview with the patient, a collateral interview with their  with their expressed consent, and administration of a series of neuropsychological tests listed in the Results section of this report. At the outset of the appointment, the undersigned explained the rationale for the evaluation along with the limits of confidentiality; and verbal informed consent for this evaluation was obtained.    The chief complaint/medical necessity leading to consultation/medical necessity is: Assessment of cognitive changes following epilepsy neurosurgery.     NEUROPSYCHOLOGICAL EVALUATION - CONFIDENTIAL    SUMMARY/TREATMENT PLAN   Summary of History:  Ms. Linda is a 69 y.o., right-handed, White, female with 18+ years of formal education. She was referred by her neurosurgeon for post-surgical evaluation following surgical treatment of intractable epilepsy, in the context of a challenging presurgical cardiac and neurologic history. At the time of her baseline neuropsychological evaluation, Ms. Linda was judged to have subtle cognitive sequelae from epilepsy and a history of intracranial hemorrhage; with overall normal-range cognition (see prior note for details).     Ms. Linda underwent a right temporal lobectomy and AVM resection with Dr. Patel on 07/10/2024. In the interval since surgery, Ms. Linda has experienced a GI bleed soon after discharge and has developed congestive heart failure. At the time of her  follow-up with Dr. Patel on 10/31/2024, Ms. Linda reported some short-term memory loss following surgery. Both at the time of her visit with Dr. Patel and at the time of this visit, Ms. Linda reported seizure freedom.    During interview, Ms. Linda reported the sudden onset and relatively-stable course of cognitive concerns beginning at the time of her above neurosurgery and subsequent cardiac/ GI concerns. Specifically, she characterized difficulties with attention, memory for recent information, and multitasking difficulties. Functionally, Ms. 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 Due to cardiogenic fatigue and physical limitations she has reduced her engagement in several vocational and daily living activities.     Emotionally, Ms. Linda discussed symptoms consistent with a adjustment disorder that she attributed to her worsening heart health, GI bleed, and concern that she will soon die. She discussed that after she was discharged from the hospital following her GI bleed she thought about completing suicide and made a lethal plan to do so - but did not follow through on it. She has since reportedly improved from a mood perspective and denied any further thoughts of self-harm. She additionally denied current clinically-significant depression or anxiety at the time of her interview. Ms. Linda's  confirmed the above, and also added that Ms. Linda has developed more-pressured speech since her temporal lobe resection.    Test Results:    Key Findings: Cognitive change information (reflected in parentheses) reflect standardized regression-based reliable change at a cutoff of Z = [1.5].   Ms. Linda is a woman of high average baseline/ presurgical general cognitive functioning on the basis of presurgical assessment.  Current general abilities are  Average - as a prorated index was completed during this repeat assessment, direct general abilities comparisons cannot be made.  Performances in the following areas were within normal limits, suggesting intact cognitive functioning in relevant domains:  Information processing speed (unchanged)  Auditory attention and working memory (significantly improved)  Visuospatial skills (unchanged)  Naming/ word-finding (unchanged)  Letter fluency (unchanged)  Semantic fluency (significant declined)  Learning, retention, recall, and recognition of rote verbal information (subtly poorer learning but subtly better delayed recall)  Learning and recall for structured verbal information (unchanged)  Learning and recall of visual information (unchanged)  Executive functions (unchanged)  Performances in the following areas were below normal limits, suggesting weakness or decline in relevant domains:  None  On screening measures of depression and anxiety, Ms. Linda endorsed severe levels of both depression and anxiety. Depression and anxiety are qualitatively worse relative to 2024.  On a quality of life index related to epilepsy, Ms. Linda endorsed Clinically significant overall quality of life reductions, driven by reduced social function, emotional well-being, energy/ fatigue, cognitive symptoms, and medication effects. Relative to 2024, responses suggest clinically-relevant improved seizure worry and poorer emotional well-being.     Data Synthesis: Cognitive test performances remain wholly within normal limits, accounting for normal intra-individual and statistical variability. She demonstrated a normal-range personal weakness on measures of processing speed.     Post-Surgical Cognitive Changes: Ms. Linda's subtly-reduced semantic access and subtly-improved attention and working memory do not connote a consistent pattern of post-surgical cognitive decline or improvement on formal testing.     Post-surgical Mood Changes:  Maddi's mood symptoms are significantly worse than at the time of her baseline evaluation, in the context of multiple major interval life stressors. While quality of life related to seizure worry has improved, emotional well-being is understandably worse than at the time of her baseline evaluation. It is most-likely that symptoms of anxiety and depression are related to her health concerns as she described symptoms of adjustment disorder which she attributed to these factors during interview. However, it is likely that her 's observation of worsened pressured speech is related to her temporal lobe resection given the timing of onset of this symptom.    Diagnostic Considerations: Consistent with her presurgical evaluation, performances are within normal limits, though suggest subtle inefficiencies in cognition that are attributable to her history of epilepsy and spontaneous ICH, consistent with mild neurocognitive disorder.     Although Ms. Linda reported resolution of mood symptoms during interview, her endorsements on a quality of life inventory and depression/ anxiety screening measures suggests that adjustment disorder symptoms remain clinically-significant concerns for her.    Diagnoses  1. Adjustment disorder with mixed anxiety and depressed mood        2. Mild neurocognitive disorder        3. History of spontaneous intraparenchymal intracranial hemorrhage        4. Chronic systolic congestive heart failure        5. Other chest pain        6. History of brain surgery             Provider Recommendations:   Ms. Linda's cognitive outcome following surgery is favorable.    Given her multiple health-related stressors following her temporal lobectomy, mood concerns are not unreasonable; however, their severity warrants intervention. I will discuss options with her. 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    Test results should serve as an  updated baseline in the event that future testing is indicated.     Patient Recommendations:  The next step in your care is to follow up with your referring provider in order to help with continued management of your care. The below recommendations may help you and your family compensate for your difficulties and better understand the reasons for your cognitive changes.    I agree with your opinion that symptoms of anxiety and depression are best-attributable to your major health stressors. Given that these symptoms are clinically significant and that you reported that emotional well-being is affecting your quality of life I recommend intervention - in particular cognitive behavioral therapy.    For management of stress/anxiety, I recommend daily practice of relaxation strategies (e.g., deep breathing, progressive muscle relaxation, mindfulness), the following are just a few good examples:   The smartphone jens [id number] can help guide you through a deep breathing exercise that may help with anxiety.   There are also excellent free videos for guided meditation, muscle relaxation, and mindfulness on [employer] or other video platforms.   I recommend trying some and finding something that works. For any of these skills, they only work if you practice them regularly.   I recommend first choosing one skill and practicing it ten minutes a day when you do not feel anxious or distressed. Then you can use these skills when you need them.     You and your  discussed that you have become more talkative. If this symptom risks affecting your relationship, consider first discussing a 'code-word' or other prompt that you and loved ones may use to take their turn in conversation. If this is not sufficient, consider discussing options with an individual psychotherapist once you establish care.    These results should serve as an updated baseline in case you require neuropsychological testing in the future.       Ms. Linda  "will be provided the results of the evaluation.     Thank you for allowing me to participate in Ms. Cervantes care.  If you have any questions, please contact me at [phone].        _____________________  Ion Chen, Ph.D.  Neuropsychologist  Department of Neuropsychology  [facility], [place]    HISTORY OF PRESENT ILLNESS: Ms. Haydee Linda is an 69 y.o., right-handed,  female with 18+ years of education who was referred for a neuropsychological evaluation following right anterior temporal lobectomy and AVM resection.     REVIEW OF SYSTEMS    COGNITIVE     Attention/Working Memory: Patient reports losing train of thought and difficulty staying on topic.     Executive Functioning: Patient reports difficulty multitasking. Patient denies impulsivity or poor problem solving.        Comments: She reported new difficulties with mathematics relative to before surgery    Processing Speed: Patient denies Processing Speed symptoms.      Language: Patient denies Language symptoms.        Comments: She denied changes in pitch or vocal inflection perception.     Visuospatial: Patient denies Visuospatial symptoms.        Comments: She reported a right eye floater    Learning and Memory:  Patient reports short-term memory loss, repeating self, rapid forgetting and misplacing or losing things.       Comments: Ms. Linda discussed that "short-term [memory] is nonexistent."    Onset: Patient reports onset of symptoms were sudden  Following her neurosurgery and subsequent hospitalizations for the above medical complications..    Course: Course of symptoms have been stable  She and her  noticed improved cognition between her initial surgery and ultimate discharge from the rehabilitation hospital following surgery and treatment of her ulcer. .    BEHAVIORAL: Patient denies personality changes or impulsive behavior.        Comments: She has pressured speech now, which is worse relative to before " "surgery per her .     Shortly after discharge from the hospital, she thought "if this is life, I've had enough of it." She thought that she would use a firearm she had access to, and felt that she had "given up." She discussed that this was a transient experience related to low energy and physical pain/ poor conditioning. She did not make an attempt and did not have a return of these thoughts since. She discussed adjustment disorder related to heart disease, her ulcer bleed, and adjusting to life after surgery. Symptoms have reportedly since improved. She denied current clinically significant depression or anxiety.    Presently, she discussed that she wishes she had more time to live, and thinks retrospectively about her life.    SLEEP: Patient reports hypersomnia. Patient denies acts out dreams.        Comments: She sleeps 10 hours a night and naps for 2-3 hours in the mid-afternoon.     She now talks in her sleep when she did not before.    PHYSICAL: Patient reports dizziness with standing.        Comments: She discussed constant ringing in her ears.    She reported gait dyspraxia when she was in post-acute rehabilitation that has since resolved.     She ambulates with a walker due to strength and balance/ safety    SAFETY CONCERNS: Patient denies Safety Concerns.        Comments: She reported imbalance and having to pause during walking to figure out where her feet are.   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     Additional ADL Information: Ms. Linda discussed that ADL difficulties are subtle or affected by cardiac/ fatigue-related physical limitations - not related to cognitive decline. Her  has financial and her daughter has medical power of .     MEDICAL HISTORY: Ms. Linda  has a past medical history of CHF (congestive heart failure), Coronary artery disease, Hypertension, Seizures, ST elevation (STEMI) myocardial infarction of unspecified site, and Stroke.    NEUROIMAGING:  Results for orders placed or performed during the hospital encounter of 07/09/24   MRI Brain Without Contrast    Narrative    EXAMINATION:  MRI BRAIN WITHOUT CONTRAST    CLINICAL HISTORY:  s/p surgery;    TECHNIQUE:  Multiplanar multisequence MR imaging of the brain was performed without intravenous contrast.    COMPARISON:  CT 07/10/2024, 07/09/2024, MRI 03/07/2024    FINDINGS:  Postprocedural change of right frontotemporal craniotomy, right anterior temporal lobe resection, and embolization procedure of right temporal lobe AVM with susceptibility artifact noted at the procedural site.  Thin rim of restricted diffusion about the operative site, likely expected postoperative change.  Expected small volume of blood, gas, and fluid at the temporal lobe resection cavity.  No large parenchymal hematoma.  No major vascular distribution infarct.  2 mm leftward midline shift, mildly decreased from prior.    Question thin extra-axial collection versus postoperative dural plasty material measuring 0.6 cm overlying the right cerebral hemisphere.  Expected pneumocephalus overlying the bilateral frontal convexities.  This appears similar to the postoperative head CT exam.    The ventricles are normal in size without evidence of hydrocephalus.      Flow voids of the skull base are normal.  Mastoid air cells and paranasal sinuses are essentially clear.      Impression    Postprocedural  change of right frontotemporal craniotomy, right anterior temporal lobe resection, and embolization of AVM.  Expected postoperative change without obvious complication.    2 mm leftward midline shift, mildly improved from prior CT.    Thin extra-axial collection versus postoperative dural plasty material (felt more likely), attention on follow-up.    Electronically signed by resident: Sharyn Sousa  Date:    07/11/2024  Time:    02:20    Electronically signed by: El Velasquez  Date:    07/11/2024  Time:    03:03   CT Head Without Contrast    Narrative    EXAMINATION:  CT HEAD WITHOUT CONTRAST    CLINICAL HISTORY:  post op;    TECHNIQUE:  Low dose axial images were obtained through the head.  Coronal and sagittal reformations were also performed. Contrast was not administered.    Exam is limited by motion artifact.    COMPARISON:  CTA head 07/09/2024    FINDINGS:  Evaluation significant distorted by motion artifacts.  Interval operative change from right frontotemporal parietal craniotomy for right inferior frontotemporal AVM resection and partial temporal lobectomy..  Expected postoperative gas and fluid within the resection cavity. There is hyperdense material along the superior aspect the resection cavity likely represents residual embolization material. There is mixed density extra-axial collection underlying the craniotomy superficial to the dural plasty suggestive for postoperative packing material and pneumocephalus. There is more prominent pneumocephalus overlying the frontal lobes bilaterally    Mass effect with partial compression of the lateral ventricles and approximately 0.3-0.4 cm of leftward midline shift which is new new.  Small caliber ventricles without hydrocephalus. No significant volume acute intracranial hemorrhage allowing for significant distortion by motion artifacts    No sulcal effacement to suggest large territory recent infarction allowing for motion artifacts. Visualized paranasal  sinuses and mastoid air cells are clear    This report was flagged in Epic as abnormal.      Impression    Postoperative change of right frontotemporal craniotomy, right anterior temporal lobectomy, and right temporal AVM embolization.    There is postoperative gas and fluid underlying the craniotomy with additional pneumocephalus overlying the frontal lobes    Slight mass effect and approximately 0.3-0.4 cm of leftward midline shift.  Smaller caliber ventricles without hydrocephalus    No definite significant volume acute intracranial hemorrhage.  Study significantly distorted by motion artifacts.  Clinical correlation and follow-up advised    Electronically signed by resident: Ez Ludwig  Date:    07/10/2024  Time:    15:58    Electronically signed by: Joao Rojas DO  Date:    07/10/2024  Time:    16:36       Current medications: Ms. Linda has a current medication list which includes the following prescription(s): acetaminophen, bacitracin, clobazam, docusate sodium, gabapentin, isosorbide mononitrate, levetiracetam, losartan, magnesium gluconate, methyl salicylate/menthol, nayzilam, multivitamin, nifedipine, nitroglycerin, oxcarbazepine, pantoprazole, potassium chloride 10%, ranolazine, sucralfate, torsemide, and UNABLE TO FIND.     Review of patient's allergies indicates:   Allergen Reactions    Opioids - morphine analogues Hallucinations     Can tolerate Codeine, Dilaudid, oxycodone, hydrocodone       PSYCHIATRIC HISTORY: Ms. Linda denied a significant psychiatric history.    SUICIDAL IDEATION:  History: Denied, except as above.   Current Stressors: Many, chiefly significant health concerns.   Active Suicidal Ideation:Denied  Plan/Intent: Denied    FAMILY HISTORY: family history includes Alcohol abuse in her brother.    PSYCHOSOCIAL HISTORY:   Education:   Level Attained: 18 and multiple course towards her doctorate. Ms. Linda has decided to discontinue work towards her Ph.D. due to the above medical  concerns.   Learning Difficulties: Denied   Special Education: Denied   Repeated Grade: Denied      SUBSTANCE USE: Ms. Linda denied a history of problematic alcohol or recreational substance use and is a non-smoker.     MENTAL STATUS AND OBSERVATIONS:  APPEARANCE: Casually dressed and adequate grooming/hygiene.   ALERTNESS/ORIENTATION: Attentive and alert. Ms. Linda was fully oriented to person, place, time, and situation.   GAIT/MOTOR: No abnormalities observed.    SENSORY: Vision and hearing were adequate for testing.   SPEECH/LANGUAGE: Quick in rate/ mildly pressured; otherwise normal in rhythm, tone, and volume. Expressive and receptive language were grossly intact.  MOOD/AFFECT: Mood was generally euthymic although she expressed anxiety related to her health and concern that she will soon die. Affect was mood-congruent.  INTERPERSONAL BEHAVIOR: Rapport was quickly and easily established.  THOUGHT PROCESSES: Thoughts seemed logical and goal-directed.   TESTING OBSERVATIONS: Ms. Linda remained cooperative throughout the evaluation. She remembered the examiner from her prior assessment and reported that she was pleased to work with her again. Ms. Linda was loquacious, tangential, and perseverated on details of her life. She responded well to redirection. Ms. Linda evidenced shakiness in her hand when anxiety was elevated.     RESULTS     Tests Administered (manual norms used unless otherwise indicated): Dot Counting Test (DCT), Wechsler Adult Intelligence Scale 4th Ed. (WAIS-IV) select subtests, Controlled Oral Word Association Test (COWAT; Richy norms), Semantic Fluency (Animals; Richy norms), [place] Naming Test (BNT; Richy norms), Noé Auditory Verbal Learning Test (RAVLT), Wechsler Memory Scale, 4th Ed. Logical Memory (WMS IV-LM), Wechsler Memory Scale, 4th Ed. Visual Reproduction (WMS IV-VR), [employer] Faces (ACS Faces) , Trail Making Test (TMT A/B; Richy norms), Geriatric  Depression Scale (GDS) and Reaves Anxiety Inventory (KATHLEEN), Quality of Life In Epilepsy (QOLIE 31-item) scale .    Score Label T-Score Standard Score Z-Score Scaled Score %ile Rank   Exceptionally High > 70 > 130 > 2.0  > 16 > 98   Above Average 64-69 120-129 1.4-1.9 15 91-97   High Average 57-63 110-119 0.7-1.3 12-14 75-90   Average 44-56  0.6 to -0.6 8-11 25-74   Low Average 37-43 80-89 -1.3 to -0.7 6-7 9-24   Below Average 30-36 70-79 -2.0 to -1.4 4-5 2-8   Exceptionally Low < 30 < 70  < -2.0 < 4 < 2       Performance Validity: Ms. Linda completed both stand-alone and embedded measures of task engagement. Below-cutoff performance on any one stand-alone measure and/ or any two embedded measures of task engagement is highly unlikely to occur outside the context of poor or inconsistent effort during testing. Ms. Linda scored above predetermined cutoffs on all administered measures of task engagement. As such, there is no evidence to suggest poor or inconsistent engagement and their performance is deemed to be a reasonable reflection of their day-to-day cognitive status.       INTELLECTUAL FUNCTIONING 2024 Scaled Score 2024 Percentile/CP Raw Score Type of Standardized Score Standardized Score Percentile/CP Descriptor   WAIS-IV            79 -  50 Average    77 -  63 Average   WMI 97 42 -  77 High Average   PSI 89 23 - SS 92 30 Average    81 - ss 102 55 Average   LANGUAGE FUNCTIONING 2024 Raw Score 2024 Percentile/CP Raw Score Type of Standardized Score Standardized Score Percentile/CP Descriptor   WAIS-IV Information 16 63 11 ss 8 25 Average   BNT-60 54 18 56 Tscore 49 46 Average   COWAT 51 69 49 Tscore 54 66 Average   Animal Naming 23 69 15 Tscore 40 16 Low Average   VISUOSPATIAL FUNCTIONING 2024 Raw Score 2024 Percentile/CP Raw Score Type of Standardized Score Standardized Score Percentile/CP Descriptor   WAIS-IV Block Design 24 25 30 ss 9 37 Average   VR Copy 42 26-50  43 - - >75 High Average   LEARNING & MEMORY 2024 Raw Score 2024 Percentile/CP Raw Score Type of Standardized Score Standardized Score Percentile/CP Descriptor   RAVLT           Trial 1 5 27 7 Tscore 59 82 High Average   Trial 2 9 54 8 Tscore 47 38 Average   Trial 3 11 50 9 Tscore 43 24 Low Average   Trial 4 14 88 10 Tscore 42 21 Low Average   Trial 5 12 34 9 Tscore 35 7 Below Average   Trials 1-5 Total 51 62 43 Tscore 41 18 Low Average   List B 5 42 5 Tscore 48 42 Average   Trial 6 (short delay) 10 38 8 Tscore 44 27 Average   30-min Recall (long delay) 8 25 10 Tscore 51 54 Average   Recognition Hits 15 - 11 Tscore - - -   Recognition FP Errors 4 - 2 Tscore - - -   Recognition Percentage Correct - 12 - Tscore 34 5 Below Average   WMS-IV Subtests           LM I 33 50 40 ss 13 84 High Average   LM II 23 75 23 ss 12 75 High Average   LM Recognition 22 >75 19 - - 51-75 Average   VR I 28 25 34 ss 11 63 Average   VR II 24 63 26 ss 12 75 High Average   VR II Recognition 5 51-75 5 - - 51-75 Average   ACS Social Cognition           Faces I 87 63 91 ss 12 75 High Average   Faces II 25 63 25 ss 11 63 Average   Faces Content 39 63 39 ss 11 63 Average   Faces Spatial 40 37 44 ss 11 63 Average   ATTENTION/WORKING MEMORY 2024 Raw Score 2024 Percentile/CP Raw Score Type of Standardized Score Standardized Score Percentile/CP Descriptor   WAIS-IV Digit Span 26 50 32 ss 13 84 High Average         DS Forward 11 63 13 ss 14 91 Above Average         DS Backward 9 63 10 ss 12 75 High Average         DS Sequence 6 25 9 ss 11 63 Average         Longest Digit Forward 8 - 9 - - - -         Longest Digit Backward 5 - 6 - - - -         Longest Digit Sequence 4 - 6 - - - -   WAIS-IV Arithmetic 12 37 15 ss 11 63 Average   MENTAL PROCESSING SPEED 2024 Raw Score 2024 Percentile/CP Raw Score Type of Standardized Score Standardized Score Percentile/CP Descriptor   WAIS-IV Symbol Search 21 25 25 ss 10 50 Average   WAIS-IV Coding 45 25 41 ss 7 16 Low  Average   TMT A  42 40 41 Tscore 42 21 Low Average   TMT A Total Err. 0 - 0 - - - -   EXECUTIVE FUNCTIONING 2024 Raw Score 2024 Percentile/CP Raw Score Type of Standardized Score Standardized Score Percentile/CP Descriptor   TMT B 72 46 74 Tscore 52 58 Average   TMT B Total Err. 0 - 0 - - - -   WAIS-IV Similarities 30 84 29 ss 12 75 High Average   WAIS-IV Matrix Reasoning 20 91 18 ss 13 84 High Average   MOOD & PERSONALITY 2024 Raw Score 2024 Descriptor Raw Score Type of Standardized Score Standardized Score Percentile/CP Descriptor   GDS-30 - - 22 - - - Severe   KATHLEEN 25 Moderate 46 - - - Severe   QOLIE-31 (%ile)          Seizure Worry 2 Clinically Significant 67.68 Tscore 54 64 WNL   Overall Quality of Life 2 Clinically Significant 40 Tscore 35 7 Moderate   Emotional Well-Being 22 WNL 28 Tscore 30 2 Clinically Significant   Energy/Fatigue 5 Moderate 15 Tscore 31 3 Moderate   Cognitive 15 Moderate 13.33 Tscore 30 2 Clinically Significant   Medication Effects 3 Moderate 8.33 Tscore 35 6 Moderate   Social Function 1 Clinically Significant 9 Tscore 28 2 Clinically Significant   Overall Score 1 Clinically Significant - Tscore 25 1 Clinically Significant   ss = scaled score (mean = 10, SD = 3); SS = standard score (mean = 100, SD = 15); Tscore mean = 50, SD = 10; zscore (mean = 0.00, SD = 1); WNL = Within Normal Limits.          Billing Documentation     Time spent conducting face to face interview with the patient: 60 minutes; 94153.  Time on review of neuropsychological test data and relevant records, data interpretation, and writing/ documentation: 170 minutes; 63219 &55183 (x2).  Psychometrist time spent in the administration and scoring of 2 or more neuropsychological tests 284 minutes; 83979 & 96672 (x8).     Referral Diagnoses:  G40.909 (ICD-10-CM) - Secondary seizure disorder

## 2025-02-20 NOTE — PROGRESS NOTES
Ms. Linda was seen during this appointment for the testing component of her neuropsychological evaluation. Please see my visit note from this same date of service for documentation of her encounter.        _____________________  Ion Chen, Ph.D.  Neuropsychologist  Department of Neuropsychology  [facility], [place]

## 2025-02-25 ENCOUNTER — TELEPHONE (OUTPATIENT)
Dept: NEUROLOGY | Facility: CLINIC | Age: 70
End: 2025-02-25
Payer: MEDICARE

## 2025-02-25 NOTE — TELEPHONE ENCOUNTER
I tried to contact the patient at both numbers with no answer. I left a message at number ending in [phone].

## 2025-02-25 NOTE — TELEPHONE ENCOUNTER
----- Message from Adan sent at 2/20/2025  2:19 PM CST -----  Contact: pt  Type: Requesting to speak with Krista Called: [place]: please call [phone] For virtual visit today Would the patient rather a call back or a response via [facility]? Call [facility] Call Back Number: [phone] Additional Information:

## 2025-02-25 NOTE — TELEPHONE ENCOUNTER
----- Message from [employer] sent at 2/20/2025  3:12 PM CST -----  Type: General Call Back Name of Caller:ptSymptoms:virtual callWould the patient rather a call back or a response via [facility]? Call [facility] Call Back Number:[phone] Additional Information: Pt is waiting for the call other phone out of order

## 2025-02-25 NOTE — TELEPHONE ENCOUNTER
Chief Complaint   Patient presents with    New Patient    Back Pain     under the care of ortho    Cholesterol Problem    Headache     hx of migraines     Neck Pain     under the care of ortho     Shoulder Pain     right rotator cuff (under the care of ortho)      1. \"Have you been to the ER, urgent care clinic since your last visit? Hospitalized since your last visit? \" No    2. \"Have you seen or consulted any other health care providers outside of the [address] [place] [address] since your last visit? \" No     3. For patients aged 39-70: Has the patient had a colonoscopy?  Yes, HM satisfied with blue hyperlink Spoke to the patient to confirm virtual appointment on 3/17 at 1:00.

## 2025-03-17 ENCOUNTER — PATIENT MESSAGE (OUTPATIENT)
Dept: NEUROLOGY | Facility: CLINIC | Age: 70
End: 2025-03-17

## 2025-03-17 ENCOUNTER — OFFICE VISIT (OUTPATIENT)
Dept: NEUROLOGY | Facility: CLINIC | Age: 70
End: 2025-03-17
Payer: MEDICARE

## 2025-03-17 DIAGNOSIS — Q28.2 AVM (ARTERIOVENOUS MALFORMATION) BRAIN: ICD-10-CM

## 2025-03-17 DIAGNOSIS — Z86.79 HISTORY OF SPONTANEOUS INTRAPARENCHYMAL INTRACRANIAL HEMORRHAGE: ICD-10-CM

## 2025-03-17 DIAGNOSIS — G40.909 SEIZURE DISORDER: Primary | ICD-10-CM

## 2025-03-17 DIAGNOSIS — G40.919 REFRACTORY EPILEPSY: ICD-10-CM

## 2025-03-17 DIAGNOSIS — F06.70 MILD NEUROCOGNITIVE DISORDER DUE TO ANOTHER MEDICAL CONDITION: ICD-10-CM

## 2025-03-17 PROCEDURE — 98006 SYNCH AUDIO-VIDEO EST MOD 30: CPT | Mod: 95,,,

## 2025-03-17 NOTE — PROGRESS NOTES
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 Relation Name Age of Onset    Alcohol abuse Brother           SOCIAL HISTORY:   Social History     Socioeconomic History    Marital status: Significant Other   Tobacco Use    Smoking status: Former     Types: Cigarettes    Smokeless tobacco: Never    Tobacco comments:     Quit 1999   Substance and Sexual Activity    Alcohol use: Not Currently    Drug use: Never     Social Drivers of Health     Financial Resource Strain: Low Risk  (7/10/2024)    Overall Financial Resource Strain (CARDIA)     Difficulty of Paying Living Expenses: Not hard at all   Food Insecurity: No Food Insecurity (7/10/2024)    Hunger Vital Sign     Worried About Running Out of Food in the Last Year: Never true     Ran Out of Food in the Last Year: Never true   Transportation Needs: No Transportation Needs (7/10/2024)    TRANSPORTATION NEEDS     Transportation : No   Physical Activity: Inactive (7/10/2024)    Exercise Vital Sign     Days of Exercise per Week: 0 days     Minutes of Exercise per Session: 0 min   Stress: No Stress Concern Present (7/10/2024)    [employer] - Occupational Stress Questionnaire     Feeling of Stress : Not at all   Recent Concern: Stress - Stress Concern Present (6/18/2024)    [employer] - Occupational Stress Questionnaire     Feeling of  Stress : To some extent   Housing Stability: Unknown (7/10/2024)    Housing Stability Vital Sign     Unable to Pay for Housing in the Last Year: No     Homeless in the Last Year: No         Questions and concerns raised by the patient and family/care-giver(s) were addressed and they indicated understanding of everything discussed and agreed to plans as above.    Emily Scruggs PA-C   Neurology-Epilepsy  [facility]    Collaborating physician, Dr. Manjit Mead, was available during today's encounter.     I spent approximately 35 minutes on the day of this encounter preparing to see the patient, obtaining and reviewing history and results, performing a medically appropriate exam, counseling and educating the patient/family/caregiver, documenting clinical information, coordinating care, and ordering medications, tests, procedures, and referrals.

## 2025-03-18 ENCOUNTER — OFFICE VISIT (OUTPATIENT)
Dept: NEUROLOGY | Facility: CLINIC | Age: 70
End: 2025-03-18
Payer: MEDICARE

## 2025-03-18 DIAGNOSIS — F43.23 ADJUSTMENT DISORDER WITH MIXED ANXIETY AND DEPRESSED MOOD: Primary | ICD-10-CM

## 2025-03-18 DIAGNOSIS — G31.84 MILD NEUROCOGNITIVE DISORDER: ICD-10-CM

## 2025-03-18 NOTE — PROGRESS NOTES
Ms. Linda and her  attended a tele-health (audio and video) feedback session to discuss the results from her recent neuropsychological testing (see report dated 02/06/2025). We discussed her test results, diagnoses, and my recommendations. Ms. Linda and her  voiced their understanding of the information provided, and voiced their intention to follow through on the recommendations provided. All questions were answered to their satisfaction and Ms. Linda was provided with a copy of her report. she was encouraged to contact our office with any future questions or concerns.         Billing Documentation     Time on review of neuropsychological test data and relevant records, data interpretation, providing feedback about these results, and writing/ documentation: 36 minutes; [id number]          _____________________  Ion Chen, Ph.D.  Neuropsychologist  Department of Neuropsychology  [facility] [place]

## 2025-03-25 RX ORDER — OXCARBAZEPINE 300 MG/1
300 TABLET, FILM COATED ORAL 2 TIMES DAILY
Qty: 180 TABLET | Refills: 3 | Status: SHIPPED | OUTPATIENT
Start: 2025-03-25 | End: 2026-03-20

## 2025-03-25 RX ORDER — CLOBAZAM 20 MG/1
40 TABLET ORAL NIGHTLY
Qty: 60 TABLET | Refills: 5 | Status: SHIPPED | OUTPATIENT
Start: 2025-03-25 | End: 2025-09-21

## 2025-03-25 RX ORDER — LEVETIRACETAM 1000 MG/1
2000 TABLET ORAL 2 TIMES DAILY
Qty: 360 TABLET | Refills: 3 | Status: SHIPPED | OUTPATIENT
Start: 2025-03-25

## 2025-07-02 ENCOUNTER — OFFICE VISIT (OUTPATIENT)
Dept: NEUROLOGY | Facility: CLINIC | Age: 70
End: 2025-07-02
Payer: MEDICARE

## 2025-07-02 DIAGNOSIS — M51.369 DEGENERATION OF INTERVERTEBRAL DISC OF LUMBAR REGION, UNSPECIFIED WHETHER PAIN PRESENT: Primary | ICD-10-CM

## 2025-07-02 PROCEDURE — 98007 SYNCH AUDIO-VIDEO EST HI 40: CPT | Mod: 95,,, | Performed by: PSYCHIATRY & NEUROLOGY

## 2025-07-02 PROCEDURE — G2211 COMPLEX E/M VISIT ADD ON: HCPCS | Mod: 95,,, | Performed by: PSYCHIATRY & NEUROLOGY
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
## 2025-07-08 ENCOUNTER — TELEPHONE (OUTPATIENT)
Dept: NEUROSURGERY | Facility: CLINIC | Age: 70
End: 2025-07-08
Payer: MEDICARE

## 2025-07-08 NOTE — TELEPHONE ENCOUNTER
Called and spoke to pt confirming appt on:    Future Appointments   Date Time Provider Department Center   8/12/2025  9:45 AM Bill Bo MD [facility] NEUROS8 [facility]       Gave pt appt date/time/location. Pt v/u and is aware to call back if appt needs to be canceled or r/s. Will mail appt reminder to PO box, per pt's request.    Copied from CRM #[id number]. Topic: Appointments - Appointment Access  >> Jul 8, 2025  2:38 PM Eliecergabe wrote:  Calling to schedule appointment due to MRI shows L1 Hemangioma, L1-S1 bulging discs and spinal stenosis; increased lower back pain, weakness and incoordination of legs L>R . She said if she needs surgery the provider would be her first choice.  Please contact patient as soon as possible.     [phone]

## 2025-07-10 ENCOUNTER — TELEPHONE (OUTPATIENT)
Dept: NEUROSURGERY | Facility: CLINIC | Age: 70
End: 2025-07-10
Payer: MEDICARE

## 2025-07-10 NOTE — TELEPHONE ENCOUNTER
Received disc of pt's MRI lumbar. MRI has alr been uploaded. Will mail back to pt's address on file.    [address] [address]  [address]  [place] [address]

## 2025-07-21 ENCOUNTER — TELEPHONE (OUTPATIENT)
Dept: NEUROSURGERY | Facility: CLINIC | Age: 70
End: 2025-07-21
Payer: MEDICARE

## 2025-07-21 NOTE — TELEPHONE ENCOUNTER
Pt called to ask for sooner appt. Informed her that she currently has earliest avail appt w/ provider. Pt v/u and asked for sooner appt w/ Dr. Bo's PAs. Informed her that their soonest avail looks like 08/14. Pt declined and asked for names of other spine surgeons on floor. Informed her of spine specialists and advised her to utilize portal to schedule a diff appt if she'd like. Pt v/u and will wait for her appt w/ Dr. Bo. She will also go to ER at [facility] if her symptoms worsen or fail to improve.

## 2025-08-12 ENCOUNTER — HOSPITAL ENCOUNTER (OUTPATIENT)
Dept: RADIOLOGY | Facility: HOSPITAL | Age: 70
Discharge: HOME | End: 2025-08-12
Attending: NEUROLOGICAL SURGERY
Payer: MEDICARE

## 2025-08-12 ENCOUNTER — OFFICE VISIT (OUTPATIENT)
Dept: NEUROSURGERY | Facility: CLINIC | Age: 70
End: 2025-08-12
Payer: MEDICARE

## 2025-08-12 DIAGNOSIS — M51.369 DEGENERATION OF INTERVERTEBRAL DISC OF LUMBAR REGION, UNSPECIFIED WHETHER PAIN PRESENT: ICD-10-CM

## 2025-08-12 PROCEDURE — 72114 X-RAY EXAM L-S SPINE BENDING: CPT | Mod: TC

## 2025-08-12 PROCEDURE — 99999 PR PBB SHADOW E&M-EST. PATIENT-LVL III: CPT | Mod: PBBFAC,,, | Performed by: NEUROLOGICAL SURGERY

## 2025-08-12 PROCEDURE — 99213 OFFICE O/P EST LOW 20 MIN: CPT | Mod: PBBFAC,25 | Performed by: NEUROLOGICAL SURGERY

## 2025-08-12 PROCEDURE — 72114 X-RAY EXAM L-S SPINE BENDING: CPT | Mod: 26,,, | Performed by: RADIOLOGY

## 2025-08-12 RX ORDER — PRUCALOPRIDE 2 MG/1
2 TABLET, FILM COATED ORAL DAILY
COMMUNITY

## 2025-08-12 RX ORDER — METHYLPREDNISOLONE 4 MG/1
TABLET ORAL
Qty: 21 EACH | Refills: 0 | Status: SHIPPED | OUTPATIENT
Start: 2025-08-12

## 2025-08-12 RX ORDER — NAPROXEN 500 MG/1
500 TABLET ORAL 2 TIMES DAILY WITH MEALS
Qty: 90 TABLET | Refills: 2 | Status: SHIPPED | OUTPATIENT
Start: 2025-08-12 | End: 2025-12-25

## 2025-08-12 RX ORDER — METHOCARBAMOL 750 MG/1
750 TABLET, FILM COATED ORAL 3 TIMES DAILY
Qty: 90 TABLET | Refills: 2 | Status: SHIPPED | OUTPATIENT
Start: 2025-08-12 | End: 2025-11-10

## 2025-08-18 ENCOUNTER — PATIENT MESSAGE (OUTPATIENT)
Dept: NEUROSURGERY | Facility: CLINIC | Age: 70
End: 2025-08-18
Payer: MEDICARE

## 2025-08-18 ENCOUNTER — TELEPHONE (OUTPATIENT)
Dept: NEUROSURGERY | Facility: CLINIC | Age: 70
End: 2025-08-18
Payer: MEDICARE

## 2025-08-18 DIAGNOSIS — M51.369 DEGENERATION OF INTERVERTEBRAL DISC OF LUMBAR REGION, UNSPECIFIED WHETHER PAIN PRESENT: Primary | ICD-10-CM

## (undated) DEVICE — ROUTER TAPERED 2.3MM

## (undated) DEVICE — DRAPE CORETEMP FLD WRM 56X62IN

## (undated) DEVICE — SUT VICRYL PLUS 3-0 SH 18IN

## (undated) DEVICE — SPRAY MASTISOL

## (undated) DEVICE — TRAY CATH 1-LYR URIMTR 16FR

## (undated) DEVICE — DRAPE C-ARM ELAS CLIP 42X120IN

## (undated) DEVICE — DRAPE STERI INSTRUMENT 1018

## (undated) DEVICE — DRESSING SURGICAL 1X3

## (undated) DEVICE — BUR BONE CUT MICRO TPS 3X3.8MM

## (undated) DEVICE — MARKER SKIN RULER STERILE

## (undated) DEVICE — SYR SLIP TIP 1CC

## (undated) DEVICE — KIT SURGIFLO HEMOSTATIC MATRIX

## (undated) DEVICE — STAPLER SKIN PROXIMATE WIDE

## (undated) DEVICE — DRAPE MICSCP ZEISS MD 48X120IN

## (undated) DEVICE — HEMOSTAT SURGICEL 4X8IN

## (undated) DEVICE — SUT 2-0 SILK 30IN BLK BRAID

## (undated) DEVICE — HOOK LONE STAR BLUNT 12MM

## (undated) DEVICE — ELECTRODE REM PLYHSV RETURN 9

## (undated) DEVICE — RUBBERBAND STERILE 3X1/8IN

## (undated) DEVICE — CORD BIPOLAR 12 FOOT

## (undated) DEVICE — DRESSING SURGICAL 1/2X1/2

## (undated) DEVICE — DRESSING SURGICAL 1X1

## (undated) DEVICE — PAD CURAD NONADH 3X4IN

## (undated) DEVICE — CLIP YASARGIL ANEURYSM 5X4MM
Type: IMPLANTABLE DEVICE | Site: CRANIAL | Status: NON-FUNCTIONAL
Removed: 2024-07-10

## (undated) DEVICE — HOOK STAY ELAS 5MM 8EA/PK

## (undated) DEVICE — DIFFUSER

## (undated) DEVICE — KIT EVACUATOR 3-SPRING 1/8 DRN

## (undated) DEVICE — BIT DRILL WIRE PASS 1.0MM

## (undated) DEVICE — SPONGE NEURO 1/4X1/4

## (undated) DEVICE — TRAY NEURO OMC

## (undated) DEVICE — SUT 4/0 18IN NUROLON BLK B

## (undated) DEVICE — PINS SKULL ADULT MAYFIELD
Type: IMPLANTABLE DEVICE | Site: CRANIAL | Status: NON-FUNCTIONAL
Removed: 2024-07-10

## (undated) DEVICE — DRAPE THREE-QTR REINF 53X77IN

## (undated) DEVICE — PACK ECLIPSE SET-UP W/O DRAPE

## (undated) DEVICE — TAPE SURG MEDIPORE 6X72IN

## (undated) DEVICE — CLIPS RANEY SCALP FFS/ASSY

## (undated) DEVICE — DRAPE T THYROID STERILE

## (undated) DEVICE — CARTRIDGE OIL

## (undated) DEVICE — TUBE FRAZIER 5MM 2FT SOFT TIP

## (undated) DEVICE — SPONGE PATTY SURGICAL .5X3IN

## (undated) DEVICE — DRAPE INCISE IOBAN 2 23X17IN